# Patient Record
Sex: MALE | Race: OTHER | HISPANIC OR LATINO | URBAN - METROPOLITAN AREA
[De-identification: names, ages, dates, MRNs, and addresses within clinical notes are randomized per-mention and may not be internally consistent; named-entity substitution may affect disease eponyms.]

---

## 2019-05-23 PROBLEM — Z00.00 ENCOUNTER FOR PREVENTIVE HEALTH EXAMINATION: Status: ACTIVE | Noted: 2019-05-23

## 2019-05-31 ENCOUNTER — OUTPATIENT (OUTPATIENT)
Dept: OUTPATIENT SERVICES | Facility: HOSPITAL | Age: 79
LOS: 1 days | Discharge: HOME | End: 2019-05-31
Payer: SELF-PAY

## 2019-06-05 ENCOUNTER — APPOINTMENT (OUTPATIENT)
Dept: RADIATION ONCOLOGY | Facility: CLINIC | Age: 79
End: 2019-06-05
Payer: SELF-PAY

## 2019-06-05 VITALS
TEMPERATURE: 36.7 F | OXYGEN SATURATION: 98 % | SYSTOLIC BLOOD PRESSURE: 122 MMHG | DIASTOLIC BLOOD PRESSURE: 73 MMHG | HEIGHT: 62 IN | HEART RATE: 93 BPM | WEIGHT: 141.76 LBS | BODY MASS INDEX: 26.09 KG/M2 | RESPIRATION RATE: 16 BRPM

## 2019-06-05 DIAGNOSIS — E11.9 TYPE 2 DIABETES MELLITUS W/OUT COMPLICATIONS: ICD-10-CM

## 2019-06-05 DIAGNOSIS — E03.9 HYPOTHYROIDISM, UNSPECIFIED: ICD-10-CM

## 2019-06-05 DIAGNOSIS — N40.1 BENIGN PROSTATIC HYPERPLASIA WITH LOWER URINARY TRACT SYMPMS: ICD-10-CM

## 2019-06-05 PROCEDURE — 99204 OFFICE O/P NEW MOD 45 MIN: CPT | Mod: 25

## 2019-06-05 NOTE — PHYSICAL EXAM
[] : no respiratory distress [Heart Rate And Rhythm] : heart rate and rhythm were normal [Abdomen Soft] : soft [Nondistended] : nondistended [Normal] : oriented to person, place and time, the affect was normal, the mood was normal and not anxious

## 2019-06-05 NOTE — VITALS
[Maximal Pain Intensity: 2/10] : 2/10 [Pain Description/Quality: ___] : Pain description/quality: [unfilled] [Pain Duration: ___] : Pain duration: [unfilled] [Pain Location: ___] : Pain Location: [unfilled] [NoTreatment Scheduled] : no treatment scheduled [80: Normal activity with effort; some signs or symptoms of disease.] : 80: Normal activity with effort; some signs or symptoms of disease.  [ECOG Performance Status: 0 - Fully active, able to carry on all pre-disease performance without restriction] : Performance Status: 0 - Fully active, able to carry on all pre-disease performance without restriction [Date: ____________] : Patient's last distress assessment performed on [unfilled]. [0 - No Distress] : Distress Level: 0 [Least Pain Intensity: 0/10] : 0/10

## 2019-06-05 NOTE — REVIEW OF SYSTEMS
[Nocturia] : nocturia [IPSS Score (0-40): ___] : IPSS score: [unfilled] [EPIC-CP Score (0-60): ___] : EPIC-CP score: [unfilled] [Negative] : Psychiatric [Anal Pain: Grade 0] : Anal Pain: Grade 0 [Proctitis: Grade 0] : Proctitis: Grade 0 [Rectal Pain: Grade 0] : Rectal Pain: Grade 0 [Hematuria: Grade 0] : Hematuria: Grade 0 [Urinary Incontinence: Grade 0] : Urinary Incontinence: Grade 0  [Urinary Retention: Grade 1 - Urinary, suprapubic or intermittent catheter placement not indicated; able to void with some residual] : Urinary Retention: Grade 1 - Urinary, suprapubic or intermittent catheter placement not indicated; able to void with some residual [Urinary Tract Pain: Grade 0] : Urinary Tract Pain: Grade 0 [Urinary Urgency: Grade 1 - Present] : Urinary Urgency: Grade 1 - Present [Urinary Frequency: Grade 2 - Limiting instrumental ADL; medical management indicated] : Urinary Frequency: Grade 2 - Limiting instrumental ADL; medical management indicated [Ejaculation Disorder: Grade 1 - Diminished ejaculation] : Ejaculation Disorder: Grade 1 - Diminished ejaculation  [Erectile Dysfunction: Grade 1 - Decrease in erectile function (frequency or rigidity of erections) but intervention not indicated (e.g., medication or use of mechanical device, penile pump)] : Erectile Dysfunction: Grade 1 - Decrease in erectile function (frequency or rigidity of erections) but intervention not indicated (e.g., medication or use of mechanical device, penile pump)

## 2019-06-10 ENCOUNTER — RESULT REVIEW (OUTPATIENT)
Age: 79
End: 2019-06-10

## 2019-06-10 PROCEDURE — 88321 CONSLTJ&REPRT SLD PREP ELSWR: CPT

## 2019-06-12 NOTE — DISEASE MANAGEMENT
[b] : b [3] : T3 [7(3+4)] : Carrol Score 7(3+4) [>20] : >20 ng/mL [0] : M0 [] : Patient had a bone scan [IIIB] : IIIB [TotalCores] : 12 [TotalPositiveCores] : 4 [MaxCoreInvolvement] : 80

## 2019-06-12 NOTE — REASON FOR VISIT
[Consideration of Curative Therapy] : consideration of curative therapy for prostate cancer [Family Member] : family member [[employer] ] : provided by [employer]   [FreeTextEntry2] : Niru

## 2019-06-12 NOTE — HISTORY OF PRESENT ILLNESS
[FreeTextEntry1] : Mr. Lucas Cordova is a 78 yr old male who had  a screening PSA of 35.95 ng/mL on 1/14/19. \par \par He underwent prostate biopsy on 1/25/19. Four out of the 12 cores were positive for adenocarcinoma. There was a [place] 3+3 = 6 adenocarcinoma from the right lateral mid gland involving 80% of the specimen, left lateral mid gland involving less than 1% of the specimen, and in the left base involving less than 5% of the specimen. There was no evidence of perineural invasion. There was also a [place] 3+4 = 7 adenocarcinoma from the left apex involving 40% of the specimen. Perineural invasion was identified.\par \par A bone scan on 2/6/19 showed no evidence of metastatic disease. There was a horizontal band of increased activity corresponding to a T12 compression fracture on CT. A CT of the abdomen and pelvis on 2/6/19 showed an enlarged spleen, and enlarged prostate with prominent enhancement of the superior prostate which was inseparable from the adjacent seminal vesicle. There was no evidence of pelvic lymphadenopathy.\par \par He received a Lupron injection on 2/5/19. He complains of difficulty urinating for the past few years. He has not taken any alpha blockers. He is recently  and often sad, as per his daughter. \par \par \par

## 2019-06-13 LAB — SURGICAL PATHOLOGY STUDY: SIGNIFICANT CHANGE UP

## 2019-06-13 PROCEDURE — 77263 THER RADIOLOGY TX PLNG CPLX: CPT

## 2019-12-31 ENCOUNTER — APPOINTMENT (OUTPATIENT)
Dept: RADIATION ONCOLOGY | Facility: CLINIC | Age: 79
End: 2019-12-31

## 2020-03-03 ENCOUNTER — APPOINTMENT (OUTPATIENT)
Dept: RADIATION ONCOLOGY | Facility: CLINIC | Age: 80
End: 2020-03-03
Payer: MEDICAID

## 2020-03-03 VITALS
SYSTOLIC BLOOD PRESSURE: 127 MMHG | DIASTOLIC BLOOD PRESSURE: 70 MMHG | HEART RATE: 86 BPM | BODY MASS INDEX: 25.16 KG/M2 | RESPIRATION RATE: 17 BRPM | WEIGHT: 137.57 LBS | OXYGEN SATURATION: 100 % | TEMPERATURE: 97.8 F

## 2020-03-03 PROCEDURE — 99214 OFFICE O/P EST MOD 30 MIN: CPT | Mod: GC,25

## 2020-03-03 RX ORDER — MECLIZINE HYDROCHLORIDE 12.5 MG/1
12.5 TABLET ORAL
Refills: 0 | Status: ACTIVE | COMMUNITY

## 2020-03-03 RX ORDER — LEUPROLIDE ACETATE 45 MG
KIT INTRAMUSCULAR
Refills: 0 | Status: ACTIVE | COMMUNITY

## 2020-03-03 RX ORDER — LEVOTHYROXINE SODIUM 0.17 MG/1
TABLET ORAL
Refills: 0 | Status: ACTIVE | COMMUNITY

## 2020-03-03 NOTE — REVIEW OF SYSTEMS
[IPSS Score (0-40): ___] : IPSS score: [unfilled] [EPIC-CP Score (0-60): ___] : EPIC-CP score: [unfilled] [Negative] : Allergic/Immunologic [Anal Pain: Grade 0] : Anal Pain: Grade 0 [Hematuria: Grade 0] : Hematuria: Grade 0 [Urinary Tract Pain: Grade 0] : Urinary Tract Pain: Grade 0 [Urinary Urgency: Grade 2 - Limiting instrumental ADL; medical management indicated] : Urinary Urgency: Grade 2 - Limiting instrumental ADL; medical management indicated [Urinary Frequency: Grade 0] : Urinary Frequency: Grade 0 [Erectile Dysfunction: Grade 0] : Erectile Dysfunction: Grade 0 [Ejaculation Disorder: Grade 0] : Ejaculation Disorder: Grade 0

## 2020-03-04 ENCOUNTER — OUTPATIENT (OUTPATIENT)
Dept: OUTPATIENT SERVICES | Facility: HOSPITAL | Age: 80
LOS: 1 days | Discharge: HOME | End: 2020-03-04
Payer: MEDICAID

## 2020-03-04 PROCEDURE — 77263 THER RADIOLOGY TX PLNG CPLX: CPT

## 2020-03-04 NOTE — PHYSICAL EXAM
[Outer Ear] : the ears and nose were normal in appearance [Sclera] : the sclera and conjunctiva were normal [] : no respiratory distress [Arterial Pulses Normal] : the arterial pulses were normal [Abdomen Soft] : soft [Nondistended] : nondistended [Normal] : oriented to person, place and time, the affect was normal, the mood was normal and not anxious

## 2020-03-17 PROCEDURE — 76872 US TRANSRECTAL: CPT | Mod: 26

## 2020-03-17 PROCEDURE — 55876 PLACE RT DEVICE/MARKER PROS: CPT

## 2020-03-17 PROCEDURE — 55874 TPRNL PLMT BIODEGRDABL MATRL: CPT

## 2020-03-25 ENCOUNTER — OUTPATIENT (OUTPATIENT)
Dept: OUTPATIENT SERVICES | Facility: HOSPITAL | Age: 80
LOS: 1 days | End: 2020-03-25
Payer: MEDICAID

## 2020-03-25 ENCOUNTER — OUTPATIENT (OUTPATIENT)
Dept: OUTPATIENT SERVICES | Facility: HOSPITAL | Age: 80
LOS: 1 days | End: 2020-03-25

## 2020-03-25 ENCOUNTER — APPOINTMENT (OUTPATIENT)
Dept: MRI IMAGING | Facility: IMAGING CENTER | Age: 80
End: 2020-03-25

## 2020-03-25 DIAGNOSIS — C61 MALIGNANT NEOPLASM OF PROSTATE: ICD-10-CM

## 2020-03-25 PROCEDURE — 76498 UNLISTED MR PROCEDURE: CPT

## 2020-03-25 NOTE — REASON FOR VISIT
[Family Member] : family member [FreeTextEntry2] : Transperineal insertion of SpaceOAR gel and fiducial marker/beacon placement

## 2020-03-25 NOTE — PROCEDURE
[FreeTextEntry1] : Transperineal insertion of SpaceOAR gel and beacon placement  [FreeTextEntry2] : Prostate cancer treatment with radiation therapy [FreeTextEntry3] : In preparation for the procedure, he self administered an enema one hour before leaving home and was NPO the night before the procedure. He took Valium 5 mg po one hour prior to procedure Procedure risks and benefits were reviewed with the patient and consent was obtained prior to procedure. A time out was observed with patient name, date of birth, procedure, position, and site verified. \par Patient was placed in a lithotomy position. Povidone-iodine was used to prep the skin. While maintaining aseptic technique an ultrasound probe was inserted into the rectum to visualize the prostate. Lidocaine 2% and sodium bicarbonate was infused into the perineal area.\par Three beacons were prepared on the sterile field. The right beacon marker was placed at the right base, left beacon marker was placed at the left base, and the third beacon marker was placed at the apex, via 14 gauge needles under ultrasound guidance. \par Next, the hydrogel spacer kit was opened onto the sterile field and the hydrogel injection apparatus was prepared. An 18 gauge needle was positioned into the mid-line perirectal fat between the anterior rectal wall and prostate under ultrasound guidance. Less than 10 cc of saline was injected via the needle to hydrodissect the space and confirm proper placement in both axial and sagittal views. The syringe was aspirated to confirm the needle was extravascular. The syringe was replaced with the hydrogel injection apparatus and the gel was injected over about 10 seconds. The needle was removed. There was minimal blood loss. The patient tolerated procedure well.\par Patient was transferred to the recovery area on a monitor. Vital signs were  recorded frequently ,He became a little nauseous and hypotensive vital signs were continued to be monitored until stable He tolerated fluid and a snack by mouth and was made comfortable.  He denied pain,He was observed in the unit and was able to ambulate independently and voided in the bathroom.   Post procedure instructions were given and reviewed with his son CT/SIM and MRI appointment were given. Pt was discharged home in care of his son.

## 2020-04-02 PROCEDURE — 77301 RADIOTHERAPY DOSE PLAN IMRT: CPT | Mod: 26

## 2020-04-02 PROCEDURE — 77300 RADIATION THERAPY DOSE PLAN: CPT | Mod: 26

## 2020-04-02 PROCEDURE — 77338 DESIGN MLC DEVICE FOR IMRT: CPT | Mod: 26

## 2020-04-03 DIAGNOSIS — C61 MALIGNANT NEOPLASM OF PROSTATE: ICD-10-CM

## 2020-04-15 NOTE — VITALS
[Maximal Pain Intensity: 0/10] : 0/10 [Least Pain Intensity: 0/10] : 0/10 [Pain Description/Quality: ___] : Pain description/quality: [unfilled] [80: Normal activity with effort; some signs or symptoms of disease.] : 80: Normal activity with effort; some signs or symptoms of disease.  [ECOG Performance Status: 0 - Fully active, able to carry on all pre-disease performance without restriction] : Performance Status: 0 - Fully active, able to carry on all pre-disease performance without restriction

## 2020-04-17 NOTE — PROCEDURE
[FreeTextEntry1] : Lupron injection [FreeTextEntry2] : As part of his treatment for prostate cancer  [FreeTextEntry3] : Mr. RIDDHI CISSE  is a 79 year  old male  with Carrol 3+5=7 and a pretreatment  PSA of 35.95 ng/ml[]  and a significant tumor burden, negative metastatic workup. He is scheduled to received definitive external beam radiation therapy with concurrent hormone therapy\par Lupron 22.5 mg administered into the left Gluteus patt side effects reviewed such as mood changes ,loss of sexual functions weight changes and hot flashes.His next Lupron is due 7/15/20\par Lot #[id number]\par Expiration 8/20/22\par

## 2020-05-01 PROCEDURE — 77387C: CUSTOM

## 2020-05-04 PROCEDURE — 77387C: CUSTOM

## 2020-05-05 PROCEDURE — 77387C: CUSTOM

## 2020-05-06 PROCEDURE — 77387C: CUSTOM

## 2020-05-07 PROCEDURE — 77427 RADIATION TX MANAGEMENT X5: CPT

## 2020-05-07 PROCEDURE — 77387C: CUSTOM

## 2020-05-07 NOTE — VITALS
[Least Pain Intensity: 0/10] : 0/10 [70: Cares for self; unalbe to carry on normal activity or do active work.] : 70: Cares for self; unable to carry on normal activity or do active work. [Maximal Pain Intensity: 0/10] : 0/10

## 2020-05-07 NOTE — REVIEW OF SYSTEMS
[Anal Pain: Grade 0] : Anal Pain: Grade 0 [Diarrhea: Grade 0] : Diarrhea: Grade 0 [Constipation: Grade 0] : Constipation: Grade 0 [Nausea: Grade 0] : Nausea: Grade 0 [Rectal Pain: Grade 0] : Rectal Pain: Grade 0 [Vomiting: Grade 0] : Vomiting: Grade 0 [Urinary Retention: Grade 0] : Urinary Retention: Grade 0 [Urinary Incontinence: Grade 0] : Urinary Incontinence: Grade 0  [Hematuria: Grade 0] : Hematuria: Grade 0 [Urinary Urgency: Grade 0] : Urinary Urgency: Grade 0 [Urinary Tract Pain: Grade 0] : Urinary Tract Pain: Grade 0 [Urinary Frequency: Grade 0] : Urinary Frequency: Grade 0

## 2020-05-08 PROCEDURE — 77387C: CUSTOM

## 2020-05-08 NOTE — HISTORY OF PRESENT ILLNESS
[FreeTextEntry1] : Mr. 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 His metastatic work up was negative He is currently receiving ADT and hypofractionated EBRT.\par \par Today is doing well overall. He denies pain.  He endorses improvement in urinary frequency.  He endorses a weaker urinary stream. He denies dysuria, hematuria, urgency, diarrhea, and constipation.  He endorses mild fatigue.  \par

## 2020-05-08 NOTE — DISEASE MANAGEMENT
[TTNM] : 3 [NTNM] : 0 [MTNM] : 0 [de-identified] : 1250 cGy [de-identified] : 7000 cGy [de-identified] : prostate

## 2020-05-11 PROCEDURE — 77387C: CUSTOM

## 2020-05-12 PROCEDURE — 77387C: CUSTOM

## 2020-05-13 PROCEDURE — 77387C: CUSTOM

## 2020-05-14 PROCEDURE — 77387C: CUSTOM

## 2020-05-14 PROCEDURE — 77427 RADIATION TX MANAGEMENT X5: CPT

## 2020-05-14 RX ORDER — AMOXICILLIN AND CLAVULANATE POTASSIUM 875; 125 MG/1; MG/1
875-125 TABLET, COATED ORAL
Qty: 6 | Refills: 0 | Status: COMPLETED | COMMUNITY
Start: 2020-03-04 | End: 2020-05-14

## 2020-05-14 RX ORDER — DIAZEPAM 5 MG/1
5 TABLET ORAL
Qty: 1 | Refills: 0 | Status: COMPLETED | COMMUNITY
Start: 2020-03-04 | End: 2020-05-14

## 2020-05-14 NOTE — DISEASE MANAGEMENT
[Clinical] : TNM Stage: c [IIIB] : IIIB [TTNM] : 3 [NTNM] : 0 [MTNM] : 0 [de-identified] : prostate [de-identified] : 2500 cGy [de-identified] : 7000 cGy

## 2020-05-14 NOTE — HISTORY OF PRESENT ILLNESS
[FreeTextEntry1] : Mr. 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 His metastatic work up was negative He is currently receiving ADT and hypofractionated EBRT.\par \par He is feeling more fatigued, usually after the treatment for a few hours, takes a nap. He also has a mild headache with the fatigue, which resolves after rest, sometimes after Tylenol.  He is having more urgency and weak stream, especially at night.  BM are regular.

## 2020-05-14 NOTE — REVIEW OF SYSTEMS
[Anal Pain: Grade 0] : Anal Pain: Grade 0 [Diarrhea: Grade 0] : Diarrhea: Grade 0 [Constipation: Grade 0] : Constipation: Grade 0 [Nausea: Grade 0] : Nausea: Grade 0 [Rectal Pain: Grade 0] : Rectal Pain: Grade 0 [Vomiting: Grade 0] : Vomiting: Grade 0 [Hematuria: Grade 0] : Hematuria: Grade 0 [Urinary Incontinence: Grade 0] : Urinary Incontinence: Grade 0  [Urinary Tract Pain: Grade 0] : Urinary Tract Pain: Grade 0 [Urinary Retention: Grade 2 - Placement of urinary, suprapubic or intermittent catheter placement indicated; medication indicated] : Urinary Retention: Grade 2 - Placement of urinary, suprapubic or intermittent catheter placement indicated; medication indicated [Urinary Urgency: Grade 2 - Limiting instrumental ADL; medical management indicated] : Urinary Urgency: Grade 2 - Limiting instrumental ADL; medical management indicated [Urinary Frequency: Grade 1 - Present] : Urinary Frequency: Grade 1 - Present

## 2020-05-15 PROCEDURE — 77387C: CUSTOM

## 2020-05-18 PROCEDURE — 77387C: CUSTOM

## 2020-05-19 PROCEDURE — 77387C: CUSTOM

## 2020-05-20 PROCEDURE — 77387C: CUSTOM

## 2020-05-21 PROCEDURE — 77427 RADIATION TX MANAGEMENT X5: CPT

## 2020-05-21 NOTE — PHYSICAL EXAM
[Normal] : well developed, well nourished, in no acute distress [Nondistended] : nondistended [Abdomen Soft] : soft

## 2020-05-21 NOTE — VITALS
[Maximal Pain Intensity: 2/10] : 2/10 [Least Pain Intensity: 0/10] : 0/10 [Pain Duration: ___] : Pain duration: [unfilled] [Pain Description/Quality: ___] : Pain description/quality: [unfilled] [Pain Location: ___] : Pain Location: [unfilled] [70: Cares for self; unalbe to carry on normal activity or do active work.] : 70: Cares for self; unable to carry on normal activity or do active work.

## 2020-05-22 PROCEDURE — 77387C: CUSTOM

## 2020-05-26 PROCEDURE — 77387C: CUSTOM

## 2020-05-27 PROCEDURE — 77387C: CUSTOM

## 2020-05-27 NOTE — VITALS
[Maximal Pain Intensity: 2/10] : 2/10 [Least Pain Intensity: 0/10] : 0/10 [Pain Description/Quality: ___] : Pain description/quality: [unfilled] [Pain Duration: ___] : Pain duration: [unfilled] [Pain Location: ___] : Pain Location: [unfilled] [70: Cares for self; unalbe to carry on normal activity or do active work.] : 70: Cares for self; unable to carry on normal activity or do active work.

## 2020-05-27 NOTE — DISEASE MANAGEMENT
[Clinical] : TNM Stage: c [IIIB] : IIIB [TTNM] : 3 [NTNM] : 0 [de-identified] : 3750 cGy [MTNM] : 0 [de-identified] : 7000 cGy [de-identified] : prostate

## 2020-05-27 NOTE — HISTORY OF PRESENT ILLNESS
[FreeTextEntry1] : Mr. 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 His metastatic work up was negative He is currently receiving ADT and hypofractionated EBRT.\par \par He is feeling more fatigued and is taking a nap daily. He is having more urgency and weak stream, especially at night. He is also having mild burning at times. He has not started the Flomax yet.

## 2020-05-28 PROCEDURE — 77387C: CUSTOM

## 2020-05-28 NOTE — REVIEW OF SYSTEMS
[Anal Pain: Grade 0] : Anal Pain: Grade 0 [Diarrhea: Grade 0] : Diarrhea: Grade 0 [Nausea: Grade 0] : Nausea: Grade 0 [Rectal Pain: Grade 0] : Rectal Pain: Grade 0 [Vomiting: Grade 0] : Vomiting: Grade 0 [Hematuria: Grade 0] : Hematuria: Grade 0 [Urinary Incontinence: Grade 0] : Urinary Incontinence: Grade 0  [Urinary Retention: Grade 2 - Placement of urinary, suprapubic or intermittent catheter placement indicated; medication indicated] : Urinary Retention: Grade 2 - Placement of urinary, suprapubic or intermittent catheter placement indicated; medication indicated [Urinary Tract Pain: Grade 0] : Urinary Tract Pain: Grade 0 [Urinary Urgency: Grade 2 - Limiting instrumental ADL; medical management indicated] : Urinary Urgency: Grade 2 - Limiting instrumental ADL; medical management indicated [Constipation: Grade 1 - Occasional or intermittent symptoms; occasional use of stool softeners, laxatives, dietary modification, or enema] : Constipation: Grade 1 - Occasional or intermittent symptoms; occasional use of stool softeners, laxatives, dietary modification, or enema [Urinary Frequency: Grade 2 - Limiting instrumental ADL; medical management indicated] : Urinary Frequency: Grade 2 - Limiting instrumental ADL; medical management indicated

## 2020-05-29 PROCEDURE — 77427 RADIATION TX MANAGEMENT X5: CPT

## 2020-05-29 PROCEDURE — 77387C: CUSTOM

## 2020-05-29 NOTE — DISEASE MANAGEMENT
[3] : T3 [b] : b [0] : M0 [>20] : >20 ng/mL [] : Patient had a CT scan [7(3+4)] : Carrol Score 7(3+4) [IIIB] : IIIB [TotalCores] : 12 [TotalPositiveCores] : 4 [MaxCoreInvolvement] : 80

## 2020-05-29 NOTE — HISTORY OF PRESENT ILLNESS
[FreeTextEntry1] : Mr. 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 His metastatic work up was negative He is currently receiving ADT and hypofractionated EBRT.\par \par  He was advised to start the tamsulosin last week for increased urinary retention and urgency.  He finds that the medication is a little helpful, stream not as weak.  However, still gets up 8 times per night to urinate.  Drinks a lot of fluids in the evening, such as, tea, coffee, water at bedtime.  Mild constipation.  No new complaints.

## 2020-05-29 NOTE — DISEASE MANAGEMENT
[Clinical] : TNM Stage: c [IIIB] : IIIB [TTNM] : 3 [NTNM] : 0 [MTNM] : 0 [de-identified] : 5650 cGy [de-identified] : 7000 cGy [de-identified] : Prostate

## 2020-05-29 NOTE — HISTORY OF PRESENT ILLNESS
[FreeTextEntry1] : Mr. Luacs Cordova is a 79 yr old male who had  a screening PSA of 35.95 ng/mL on 1/14/19. \par \par He underwent prostate biopsy on 1/25/19. Four out of the 12 cores were positive for adenocarcinoma. There was a [place] 3+3 = 6 adenocarcinoma from the right lateral mid gland involving 80% of the specimen, left lateral mid gland involving less than 1% of the specimen, and in the left base involving less than 5% of the specimen. There was no evidence of perineural invasion. There was also a [place] 3+4 = 7 adenocarcinoma from the left apex involving 40% of the specimen. Perineural invasion was identified.\par \par A bone scan on 2/6/19 showed no evidence of metastatic disease. There was a horizontal band of increased activity corresponding to a T12 compression fracture on CT. A CT of the abdomen and pelvis on 2/6/19 showed an enlarged spleen, and enlarged prostate with prominent enhancement of the superior prostate which was inseparable from the adjacent seminal vesicle. There was no evidence of pelvic lymphadenopathy.\par \par He received a Lupron injection on 2/5/19. He was last seen in clinic on 6/5/19 and at the time he complained of difficulty urinating for the past few years. Flomax was given to him which he said was helpful. However he developed some nausea when he first started taking Flomax, but the nausea has later subsided. \par \par He was having difficulty with insurance and did not receive radiation treatment. He continues to get Lupron injections at [facility]. He was last seen by Dr. Mills on 1/29/2020 who recommended that he seek definitive therapy. His PSA in November 2019 was 0.07.\par

## 2020-05-29 NOTE — LETTER CLOSING
[Consult Closing:] : Thank you for allowing me to participate in the care of this patient.  If you have any questions, please do not hesitate to contact me. [Sincerely yours,] : Sincerely yours, [FreeTextEntry3] : Maryuri Franco MD\par

## 2020-06-01 PROCEDURE — 77387C: CUSTOM

## 2020-06-02 PROCEDURE — 77387C: CUSTOM

## 2020-06-03 PROCEDURE — 77387C: CUSTOM

## 2020-06-03 NOTE — REVIEW OF SYSTEMS
[Anal Pain: Grade 0] : Anal Pain: Grade 0 [Constipation: Grade 1 - Occasional or intermittent symptoms; occasional use of stool softeners, laxatives, dietary modification, or enema] : Constipation: Grade 1 - Occasional or intermittent symptoms; occasional use of stool softeners, laxatives, dietary modification, or enema [Diarrhea: Grade 0] : Diarrhea: Grade 0 [Nausea: Grade 0] : Nausea: Grade 0 [Rectal Pain: Grade 0] : Rectal Pain: Grade 0 [Vomiting: Grade 0] : Vomiting: Grade 0 [Hematuria: Grade 0] : Hematuria: Grade 0 [Urinary Incontinence: Grade 0] : Urinary Incontinence: Grade 0  [Urinary Retention: Grade 2 - Placement of urinary, suprapubic or intermittent catheter placement indicated; medication indicated] : Urinary Retention: Grade 2 - Placement of urinary, suprapubic or intermittent catheter placement indicated; medication indicated [Urinary Tract Pain: Grade 0] : Urinary Tract Pain: Grade 0 [Urinary Urgency: Grade 2 - Limiting instrumental ADL; medical management indicated] : Urinary Urgency: Grade 2 - Limiting instrumental ADL; medical management indicated [Urinary Frequency: Grade 2 - Limiting instrumental ADL; medical management indicated] : Urinary Frequency: Grade 2 - Limiting instrumental ADL; medical management indicated

## 2020-06-04 PROCEDURE — 77387C: CUSTOM

## 2020-06-05 PROCEDURE — 77387C: CUSTOM

## 2020-06-08 PROCEDURE — 77387C: CUSTOM

## 2020-06-08 NOTE — HISTORY OF PRESENT ILLNESS
[FreeTextEntry1] : Mr. 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 His metastatic work up was negative He is currently receiving ADT and hypofractionated EBRT.\par \par He continues to take the tamsulosin  for increased urinary retention and urgency.  He finds that the medication is a little helpful, stream not as weak.  However, last visit he still reports he gets up 8 times per night to urinate. Instructed last visit to do trial of 2 tabs of tamsulosin at bedtime.  He is now taking one tab in morning and one at bedtime and urinating less in the day and getting up 5 days at bedtime, which is an improvement.  Some fatigue, relieved by rest.

## 2020-06-08 NOTE — DISEASE MANAGEMENT
[Clinical] : TNM Stage: c [IIIB] : IIIB [TTNM] : 3 [NTNM] : 0 [MTNM] : 0 [de-identified] : 5750 cGy [de-identified] : 7000 cGy [de-identified] : Prostate

## 2020-06-09 PROCEDURE — 77387C: CUSTOM

## 2020-06-10 PROCEDURE — 77387C: CUSTOM

## 2020-06-10 PROCEDURE — 77427 RADIATION TX MANAGEMENT X5: CPT

## 2020-06-10 NOTE — REASON FOR VISIT
[Routine On-Treatment] : a routine on-treatment visit for [Family Member] : family member [Prostate Cancer] : prostate cancer

## 2020-06-10 NOTE — REVIEW OF SYSTEMS
[Anal Pain: Grade 0] : Anal Pain: Grade 0 [Constipation: Grade 1 - Occasional or intermittent symptoms; occasional use of stool softeners, laxatives, dietary modification, or enema] : Constipation: Grade 1 - Occasional or intermittent symptoms; occasional use of stool softeners, laxatives, dietary modification, or enema [Diarrhea: Grade 0] : Diarrhea: Grade 0 [Nausea: Grade 0] : Nausea: Grade 0 [Rectal Pain: Grade 0] : Rectal Pain: Grade 0 [Vomiting: Grade 0] : Vomiting: Grade 0 [Urinary Incontinence: Grade 0] : Urinary Incontinence: Grade 0  [Hematuria: Grade 0] : Hematuria: Grade 0 [Urinary Tract Pain: Grade 0] : Urinary Tract Pain: Grade 0 [Fatigue: Grade 1 - Fatigue relieved by rest] : Fatigue: Grade 1 - Fatigue relieved by rest [Urinary Retention: Grade 1 - Urinary, suprapubic or intermittent catheter placement not indicated; able to void with some residual] : Urinary Retention: Grade 1 - Urinary, suprapubic or intermittent catheter placement not indicated; able to void with some residual [Urinary Urgency: Grade 1 - Present] : Urinary Urgency: Grade 1 - Present [Urinary Frequency: Grade 1 - Present] : Urinary Frequency: Grade 1 - Present

## 2020-06-15 NOTE — DISEASE MANAGEMENT
[Clinical] : TNM Stage: c [IIIB] : IIIB [TTNM] : 3 [NTNM] : 0 [MTNM] : 0 [de-identified] : 7000 cGy [de-identified] : 7000 cGy

## 2020-06-15 NOTE — HISTORY OF PRESENT ILLNESS
[FreeTextEntry1] : Mr. Lucas Cordova is a 79 year old male with prognostic stage IIIB adenocarcinoma of the prostate, with a [place] score of 3+4=7 and a pre-treatment PSA of 35.95 ng/mL. His metastatic work up was negative He is currently receiving ADT and hypofractionated EBRT.\par \par He completes RT today, and has a followup scheduled on 7/15/20 for his 3rd Lupron injection and PTE.\par He continues to take the tamsulosin for increased urinary retention and urgency.  Previously he reported he was still getting up 8 times per night to urinate. He increased the tamsulosin to one tablet in the morning and one at bedtime, and having some relief with less getting up to urinate at night.  Some fatigue, relieved by rest.\par \par 1st Lupron at [facility] (2/2019) another Lupron 6 mos. given June 2019, Lupron 1 mo. given Jan. 2020.  Lupron given here on 4/15/2020. 3rd due in 7/15/2020.  Plan for at least two years of hormone therapy.

## 2020-06-15 NOTE — VITALS
[Maximal Pain Intensity: 2/10] : 2/10 [Least Pain Intensity: 0/10] : 0/10 [Pain Description/Quality: ___] : Pain description/quality: [unfilled] [Pain Duration: ___] : Pain duration: [unfilled] [Pain Location: ___] : Pain Location: [unfilled] [80: Normal activity with effort; some signs or symptoms of disease.] : 80: Normal activity with effort; some signs or symptoms of disease.  [ECOG Performance Status: 1 - Restricted in physically strenuous activity but ambulatory and able to carry out work of a light or sedentary nature] : Performance Status: 1 - Restricted in physically strenuous activity but ambulatory and able to carry out work of a light or sedentary nature, e.g., light house work, office work

## 2020-07-15 ENCOUNTER — APPOINTMENT (OUTPATIENT)
Dept: RADIATION ONCOLOGY | Facility: CLINIC | Age: 80
End: 2020-07-15

## 2020-07-20 NOTE — REASON FOR VISIT
[Post-Treatment Evaluation] : post-treatment evaluation for prostate cancer [Family Member] : family member

## 2020-07-21 ENCOUNTER — APPOINTMENT (OUTPATIENT)
Dept: RADIATION ONCOLOGY | Facility: CLINIC | Age: 80
End: 2020-07-21
Payer: SELF-PAY

## 2020-07-21 VITALS
BODY MASS INDEX: 25.92 KG/M2 | WEIGHT: 140.87 LBS | DIASTOLIC BLOOD PRESSURE: 58 MMHG | OXYGEN SATURATION: 100 % | TEMPERATURE: 98.5 F | HEIGHT: 62 IN | HEART RATE: 92 BPM | RESPIRATION RATE: 17 BRPM | SYSTOLIC BLOOD PRESSURE: 101 MMHG

## 2020-07-21 PROCEDURE — 99024 POSTOP FOLLOW-UP VISIT: CPT | Mod: GC

## 2020-07-21 NOTE — REVIEW OF SYSTEMS
[Negative] : Allergic/Immunologic [Anal Pain: Grade 0] : Anal Pain: Grade 0 [Urinary Urgency: Grade 2 - Limiting instrumental ADL; medical management indicated] : Urinary Urgency: Grade 2 - Limiting instrumental ADL; medical management indicated [Hematuria: Grade 0] : Hematuria: Grade 0 [Urinary Tract Pain: Grade 0] : Urinary Tract Pain: Grade 0 [Urinary Frequency: Grade 0] : Urinary Frequency: Grade 0 [Erectile Dysfunction: Grade 0] : Erectile Dysfunction: Grade 0 [Ejaculation Disorder: Grade 0] : Ejaculation Disorder: Grade 0 [IPSS Score (0-40): ___] : IPSS score: [unfilled] [EPIC-CP Score (0-60): ___] : EPIC-CP score: [unfilled]

## 2020-07-21 NOTE — REVIEW OF SYSTEMS
[Negative] : Heme/Lymph [Anal Pain: Grade 0] : Anal Pain: Grade 0 [Hematuria: Grade 0] : Hematuria: Grade 0 [Urinary Urgency: Grade 2 - Limiting instrumental ADL; medical management indicated] : Urinary Urgency: Grade 2 - Limiting instrumental ADL; medical management indicated [Urinary Tract Pain: Grade 0] : Urinary Tract Pain: Grade 0 [Urinary Frequency: Grade 0] : Urinary Frequency: Grade 0 [Erectile Dysfunction: Grade 0] : Erectile Dysfunction: Grade 0 [Ejaculation Disorder: Grade 0] : Ejaculation Disorder: Grade 0 [IPSS Score (0-40): ___] : IPSS score: [unfilled] [EPIC-CP Score (0-60): ___] : EPIC-CP score: [unfilled]

## 2020-07-22 LAB — PSA SERPL-MCNC: 0.02 NG/ML

## 2020-07-24 NOTE — HISTORY OF PRESENT ILLNESS
[FreeTextEntry1] : Mr. 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 He started ADT in Feb. 2019 and continues to the present day. He presents for a post treatment evaluation.\par \par He is feeling well. He is involved in all his normal activities. He denies pain or discomfort.  He continues to have urinary frequency and nocturia, a little improved since treatment. He continues tamsulosin. He has no dysuria, hematuria or incontinence. The bowel movements are regular, and there is no rectal pain, bleeding or discharge.  He has erectile dysfunction without any change. \par \par Last Lupron was given here on 4/15/2020 (and this was the first injection to be given here at [facility] in [facility]).  He is due for another Lupron injection now, pending insurance authorization. Plan for at least two years of hormone therapy. \par

## 2020-07-24 NOTE — PHYSICAL EXAM
[Normal] : well developed, well nourished, in no acute distress [Sclera] : the sclera and conjunctiva were normal [] : no respiratory distress [Heart Rate And Rhythm] : heart rate and rhythm were normal [Abdomen Soft] : soft [Nondistended] : nondistended [No Focal Deficits] : no focal deficits

## 2020-07-24 NOTE — DISEASE MANAGEMENT
[3] : T3 [b] : b [0] : M0 [7(3+4)] : Carrol Score 7(3+4) [>20] : >20 ng/mL [] : Patient had a CT scan [IIIB] : IIIB [Radiation Therapy] : Radiation Therapy [TotalCores] : 12 [TotalPositiveCores] : 4 [MaxCoreInvolvement] : 80

## 2020-07-24 NOTE — DISEASE MANAGEMENT
[3] : T3 [b] : b [0] : M0 [>20] : >20 ng/mL [7(3+4)] : Carrol Score 7(3+4) [] : Patient had a CT scan [IIIB] : IIIB [Radiation Therapy] : Radiation Therapy [TotalCores] : 12 [TotalPositiveCores] : 4 [MaxCoreInvolvement] : 80

## 2020-07-24 NOTE — HISTORY OF PRESENT ILLNESS
[FreeTextEntry1] : Mr. 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 He started ADT in Feb. 2019 and continues to the present day. He presents for a post treatment evaluation.\par \par He is feeling well. He is involved in all his normal activities. He denies pain or discomfort.  He continues to have urinary frequency and nocturia, a little improved since treatment. He continues tamsulosin. He has no dysuria, hematuria or incontinence. The bowel movements are regular, and there is no rectal pain, bleeding or discharge.  He has erectile dysfunction without any change. \par \par Last Lupron was given here on 4/15/2020 (and this was the first injection to be given here at [facility] in [facility]).  He is due for another Lupron injection now, pending insurance authorization. Plan for at least two years of hormone therapy. \par

## 2020-07-28 NOTE — DATA REVIEWED
[FreeTextEntry1] : Patient received Lupron 45mg injection IM left gluteal. Lot [id number].  Exp 9/25/21.\par Approved through medical benefits.  In future, must be obtained from medical benefits and administered in clinic.   Next injection in 6months on 1/26/2021.

## 2020-08-21 ENCOUNTER — APPOINTMENT (OUTPATIENT)
Dept: DERMATOLOGY | Facility: CLINIC | Age: 80
End: 2020-08-21

## 2021-01-20 RX ORDER — LEUPROLIDE ACETATE 22.5 MG
22.5 KIT INTRAMUSCULAR
Qty: 1 | Refills: 1 | Status: DISCONTINUED | COMMUNITY
Start: 2020-04-01 | End: 2021-01-20

## 2021-01-26 ENCOUNTER — APPOINTMENT (OUTPATIENT)
Dept: RADIATION ONCOLOGY | Facility: CLINIC | Age: 81
End: 2021-01-26
Payer: MEDICAID

## 2021-02-02 ENCOUNTER — APPOINTMENT (OUTPATIENT)
Dept: RADIATION ONCOLOGY | Facility: CLINIC | Age: 81
End: 2021-02-02
Payer: MEDICAID

## 2021-02-09 ENCOUNTER — APPOINTMENT (OUTPATIENT)
Dept: RADIATION ONCOLOGY | Facility: CLINIC | Age: 81
End: 2021-02-09
Payer: MEDICAID

## 2021-02-09 VITALS — RESPIRATION RATE: 16 BRPM | SYSTOLIC BLOOD PRESSURE: 110 MMHG | DIASTOLIC BLOOD PRESSURE: 86 MMHG

## 2021-02-09 PROCEDURE — 99212 OFFICE O/P EST SF 10 MIN: CPT

## 2021-02-09 RX ORDER — TAMSULOSIN HYDROCHLORIDE 0.4 MG/1
0.4 CAPSULE ORAL
Qty: 90 | Refills: 2 | Status: DISCONTINUED | COMMUNITY
Start: 2019-06-05 | End: 2021-02-09

## 2021-02-09 NOTE — HISTORY OF PRESENT ILLNESS
[FreeTextEntry1] : Mr. 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 He started ADT in Feb. 2019 and continues to the present day- last Lupron injection in 7/2020 (6 month). He presents for a routine follow up.\par \par He is feeling well. He is involved in all his normal activities. He denies pain or discomfort.  He continues to have mild urinary frequency and nocturia. When he starts to urinate, he has to wait a few minutes, then he can empty the bladder.   He has no dysuria, hematuria or incontinence. The bowel movements are regular, and there is no rectal pain, bleeding or discharge.  He has erectile dysfunction without any change.  He reports that his PMD told him that he was anemic. \par \par Last Lupron (45mg) was given here on 7/28/2020 (his first injection given here at [facility] in [facility] was on 4/15/20). \par

## 2021-02-09 NOTE — REVIEW OF SYSTEMS
[EPIC-CP Score (0-60): ___] : EPIC-CP score: [unfilled] [Negative] : Allergic/Immunologic [Anal Pain: Grade 0] : Anal Pain: Grade 0 [Hematuria: Grade 0] : Hematuria: Grade 0 [Urinary Tract Pain: Grade 0] : Urinary Tract Pain: Grade 0 [Constipation: Grade 0] : Constipation: Grade 0 [Diarrhea: Grade 0] : Diarrhea: Grade 0 [Rectal Pain: Grade 0] : Rectal Pain: Grade 0 [Urinary Incontinence: Grade 0] : Urinary Incontinence: Grade 0  [Urinary Retention: Grade 1 - Urinary, suprapubic or intermittent catheter placement not indicated; able to void with some residual] : Urinary Retention: Grade 1 - Urinary, suprapubic or intermittent catheter placement not indicated; able to void with some residual [Urinary Urgency: Grade 1 - Present] : Urinary Urgency: Grade 1 - Present [Urinary Frequency: Grade 1 - Present] : Urinary Frequency: Grade 1 - Present [Ejaculation Disorder: Grade 1 - Diminished ejaculation] : Ejaculation Disorder: Grade 1 - Diminished ejaculation  [Erectile Dysfunction: Grade 1 - Decrease in erectile function (frequency or rigidity of erections) but intervention not indicated (e.g., medication or use of mechanical device, penile pump)] : Erectile Dysfunction: Grade 1 - Decrease in erectile function (frequency or rigidity of erections) but intervention not indicated (e.g., medication or use of mechanical device, penile pump) [IPSS Score (0-40): ___] : IPSS score: [unfilled]

## 2021-02-09 NOTE — DISEASE MANAGEMENT
[IIIB] : IIIB [3] : T3 [b] : b [0] : M0 [7(3+4)] : Carrol Score 7(3+4) [>20] : >20 ng/mL [] : Patient had a bone scan [Radiation Therapy] : Radiation Therapy [EBRT] : EBRT [Treatment with androgen ablation] : Treatment with androgen ablation [Clinical] : TNM Stage: c [TTNM] : 3b [NTNM] : 0 [MTNM] : 0 [TotalCores] : 12 [TotalPositiveCores] : 4 [MaxCoreInvolvement] : 80 [RadiationCompletedDate] : 06/20 [EBRTDose] : 7000cGy [EBRTFractions] : 28 [ADTStartedDate] : 02/19 [ADTDuration] : 2 years [ADTCompletedDate] : 02/21

## 2021-02-09 NOTE — PHYSICAL EXAM
[Sclera] : the sclera and conjunctiva were normal [] : no respiratory distress [Abdomen Soft] : soft [Nondistended] : nondistended [Heart Rate And Rhythm] : heart rate and rhythm were normal [Normal] : oriented to person, place and time, the affect was normal, the mood was normal and not anxious

## 2021-02-09 NOTE — HISTORY OF PRESENT ILLNESS
[FreeTextEntry1] : Mr. 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 He started ADT in Feb. 2019 and continues to the present day- last Lupron injection in 7/2020 (6 month). He presents for a routine follow up.\par \par He is feeling well. He is involved in all his normal activities. He denies pain or discomfort.  He continues to have mild urinary frequency and nocturia. When he starts to urinate, he has to wait a few minutes, then he can empty the bladder.   He has no dysuria, hematuria or incontinence. The bowel movements are regular, and there is no rectal pain, bleeding or discharge.  He has erectile dysfunction without any change.  He reports that his PMD told him that he was anemic. \par \par Last Lupron (45mg) was given here on 7/28/2020 (his first injection given here at [facility] in [facility] was on 4/15/20). \par

## 2021-02-10 LAB — PSA SERPL-MCNC: 0.02 NG/ML

## 2021-08-09 ENCOUNTER — APPOINTMENT (OUTPATIENT)
Dept: RADIATION ONCOLOGY | Facility: CLINIC | Age: 81
End: 2021-08-09
Payer: MEDICAID

## 2021-08-16 ENCOUNTER — APPOINTMENT (OUTPATIENT)
Dept: RADIATION ONCOLOGY | Facility: CLINIC | Age: 81
End: 2021-08-16
Payer: MEDICAID

## 2021-08-19 ENCOUNTER — APPOINTMENT (OUTPATIENT)
Dept: RADIATION ONCOLOGY | Facility: CLINIC | Age: 81
End: 2021-08-19
Payer: MEDICAID

## 2021-08-19 VITALS
OXYGEN SATURATION: 100 % | WEIGHT: 133.71 LBS | RESPIRATION RATE: 17 BRPM | BODY MASS INDEX: 24.46 KG/M2 | TEMPERATURE: 95.72 F | HEART RATE: 83 BPM | DIASTOLIC BLOOD PRESSURE: 71 MMHG | SYSTOLIC BLOOD PRESSURE: 119 MMHG

## 2021-08-19 PROCEDURE — 99212 OFFICE O/P EST SF 10 MIN: CPT

## 2021-08-25 NOTE — DISEASE MANAGEMENT
[TotalCores] : 12 [TotalPositiveCores] : 4 [MaxCoreInvolvement] : 80 [RadiationCompletedDate] : 06/20 [EBRTDose] : 7000cGy [EBRTFractions] : 28 [ADTStartedDate] : 02/19 [ADTDuration] : 2 years [ADTCompletedDate] : 02/21 [TTNM] : 3b [NTNM] : 0 [MTNM] : 0

## 2021-08-25 NOTE — HISTORY OF PRESENT ILLNESS
[FreeTextEntry1] : Mr. 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 He started ADT in Feb. 2019 and continues to the present day- last Lupron injection in 7/2020 (6 month). ADT discontinued for anemia and since it had been about 2 years. He presents for a routine follow up.\par \par He is feeling well. He is involved in all his normal activities. He denies pain or discomfort.  He continues to have mild urinary frequency and nocturia. When he starts to urinate, he has to wait a few minutes, then he can empty the bladder.   He has no dysuria, hematuria or incontinence. The bowel movements are regular, and there is no rectal pain, bleeding or discharge.  He has erectile dysfunction without any change.  Last Lupron (45mg) was given here on 7/28/2020.\par \par PSA 8/11/21 is <0.1 ng/mL.

## 2021-08-25 NOTE — DATA REVIEWED
[FreeTextEntry1] : PSA (ng/mL)\par \par 05/14/20  0.14\par 07/20/20  0.02\par 02/09/21  0.02\par 08/12/21 <0.1

## 2022-05-19 ENCOUNTER — NON-APPOINTMENT (OUTPATIENT)
Age: 82
End: 2022-05-19

## 2022-05-19 ENCOUNTER — APPOINTMENT (OUTPATIENT)
Dept: OPHTHALMOLOGY | Facility: CLINIC | Age: 82
End: 2022-05-19
Payer: MEDICAID

## 2022-05-19 PROCEDURE — 92134 CPTRZ OPH DX IMG PST SGM RTA: CPT

## 2022-05-19 PROCEDURE — 92020 GONIOSCOPY: CPT

## 2022-05-19 PROCEDURE — 92004 COMPRE OPH EXAM NEW PT 1/>: CPT

## 2022-06-09 ENCOUNTER — NON-APPOINTMENT (OUTPATIENT)
Age: 82
End: 2022-06-09

## 2022-06-09 ENCOUNTER — APPOINTMENT (OUTPATIENT)
Dept: OPHTHALMOLOGY | Facility: CLINIC | Age: 82
End: 2022-06-09
Payer: MEDICAID

## 2022-06-09 PROCEDURE — 92012 INTRM OPH EXAM EST PATIENT: CPT

## 2022-07-05 ENCOUNTER — APPOINTMENT (OUTPATIENT)
Dept: OPHTHALMOLOGY | Facility: CLINIC | Age: 82
End: 2022-07-05

## 2022-09-14 ENCOUNTER — APPOINTMENT (OUTPATIENT)
Dept: OPHTHALMOLOGY | Facility: CLINIC | Age: 82
End: 2022-09-14

## 2023-09-17 NOTE — ASSESSMENT
[No evidence of disease] : No evidence of disease
[No evidence of disease] : No evidence of disease
Normal rate, regular rhythm.  Heart sounds S1, S2.  No murmurs, rubs or gallops.

## 2023-11-01 ENCOUNTER — TRANSCRIPTION ENCOUNTER (OUTPATIENT)
Age: 83
End: 2023-11-01

## 2023-12-03 ENCOUNTER — OUTPATIENT (OUTPATIENT)
Dept: OUTPATIENT SERVICES | Facility: HOSPITAL | Age: 83
LOS: 1 days | Discharge: HOME | End: 2023-12-03
Payer: MEDICAID

## 2023-12-05 ENCOUNTER — APPOINTMENT (OUTPATIENT)
Dept: RADIATION ONCOLOGY | Facility: CLINIC | Age: 83
End: 2023-12-05
Payer: MEDICAID

## 2023-12-05 VITALS
DIASTOLIC BLOOD PRESSURE: 56 MMHG | HEIGHT: 62 IN | SYSTOLIC BLOOD PRESSURE: 126 MMHG | RESPIRATION RATE: 16 BRPM | HEART RATE: 85 BPM | OXYGEN SATURATION: 97 % | TEMPERATURE: 96.98 F | BODY MASS INDEX: 24.87 KG/M2 | WEIGHT: 135.14 LBS

## 2023-12-05 PROCEDURE — 99214 OFFICE O/P EST MOD 30 MIN: CPT | Mod: 25

## 2023-12-06 ENCOUNTER — NON-APPOINTMENT (OUTPATIENT)
Age: 83
End: 2023-12-06

## 2023-12-06 LAB — PSA SERPL-MCNC: 1009 NG/ML

## 2023-12-07 ENCOUNTER — OUTPATIENT (OUTPATIENT)
Dept: OUTPATIENT SERVICES | Facility: HOSPITAL | Age: 83
LOS: 1 days | Discharge: HOME | End: 2023-12-07

## 2023-12-07 DIAGNOSIS — C61 MALIGNANT NEOPLASM OF PROSTATE: ICD-10-CM

## 2023-12-08 ENCOUNTER — INPATIENT (INPATIENT)
Facility: HOSPITAL | Age: 83
LOS: 6 days | Discharge: HOME | End: 2023-12-15
Attending: INTERNAL MEDICINE | Admitting: INTERNAL MEDICINE
Payer: MEDICAID

## 2023-12-08 ENCOUNTER — NON-APPOINTMENT (OUTPATIENT)
Age: 83
End: 2023-12-08

## 2023-12-08 ENCOUNTER — APPOINTMENT (OUTPATIENT)
Dept: HEMATOLOGY ONCOLOGY | Facility: CLINIC | Age: 83
End: 2023-12-08
Payer: MEDICAID

## 2023-12-08 VITALS
BODY MASS INDEX: 24.84 KG/M2 | TEMPERATURE: 98.3 F | DIASTOLIC BLOOD PRESSURE: 63 MMHG | RESPIRATION RATE: 16 BRPM | OXYGEN SATURATION: 100 % | HEIGHT: 62 IN | HEART RATE: 99 BPM | SYSTOLIC BLOOD PRESSURE: 97 MMHG | WEIGHT: 134.99 LBS

## 2023-12-08 VITALS
TEMPERATURE: 98 F | OXYGEN SATURATION: 100 % | HEART RATE: 93 BPM | SYSTOLIC BLOOD PRESSURE: 112 MMHG | RESPIRATION RATE: 18 BRPM | DIASTOLIC BLOOD PRESSURE: 55 MMHG

## 2023-12-08 DIAGNOSIS — Z86.39 PERSONAL HISTORY OF OTHER ENDOCRINE, NUTRITIONAL AND METABOLIC DISEASE: ICD-10-CM

## 2023-12-08 DIAGNOSIS — Z86.2 PERSONAL HISTORY OF DISEASES OF THE BLOOD AND BLOOD-FORMING ORGANS AND CERTAIN DISORDERS INVOLVING THE IMMUNE MECHANISM: ICD-10-CM

## 2023-12-08 LAB
ALBUMIN SERPL ELPH-MCNC: 3.8 G/DL — SIGNIFICANT CHANGE UP (ref 3.3–5)
ALBUMIN SERPL ELPH-MCNC: 3.8 G/DL — SIGNIFICANT CHANGE UP (ref 3.3–5)
ALP SERPL-CCNC: 220 U/L — HIGH (ref 40–120)
ALP SERPL-CCNC: 220 U/L — HIGH (ref 40–120)
ALT FLD-CCNC: 45 U/L — HIGH (ref 4–41)
ALT FLD-CCNC: 45 U/L — HIGH (ref 4–41)
ANION GAP SERPL CALC-SCNC: 10 MMOL/L — SIGNIFICANT CHANGE UP (ref 7–14)
ANION GAP SERPL CALC-SCNC: 10 MMOL/L — SIGNIFICANT CHANGE UP (ref 7–14)
AST SERPL-CCNC: 30 U/L — SIGNIFICANT CHANGE UP (ref 4–40)
AST SERPL-CCNC: 30 U/L — SIGNIFICANT CHANGE UP (ref 4–40)
BASOPHILS # BLD AUTO: 0.01 K/UL — SIGNIFICANT CHANGE UP (ref 0–0.2)
BASOPHILS # BLD AUTO: 0.01 K/UL — SIGNIFICANT CHANGE UP (ref 0–0.2)
BASOPHILS NFR BLD AUTO: 0.2 % — SIGNIFICANT CHANGE UP (ref 0–2)
BASOPHILS NFR BLD AUTO: 0.2 % — SIGNIFICANT CHANGE UP (ref 0–2)
BILIRUB SERPL-MCNC: 0.6 MG/DL — SIGNIFICANT CHANGE UP (ref 0.2–1.2)
BILIRUB SERPL-MCNC: 0.6 MG/DL — SIGNIFICANT CHANGE UP (ref 0.2–1.2)
BUN SERPL-MCNC: 37 MG/DL — HIGH (ref 7–23)
BUN SERPL-MCNC: 37 MG/DL — HIGH (ref 7–23)
CALCIUM SERPL-MCNC: 9.1 MG/DL — SIGNIFICANT CHANGE UP (ref 8.4–10.5)
CALCIUM SERPL-MCNC: 9.1 MG/DL — SIGNIFICANT CHANGE UP (ref 8.4–10.5)
CHLORIDE SERPL-SCNC: 98 MMOL/L — SIGNIFICANT CHANGE UP (ref 98–107)
CHLORIDE SERPL-SCNC: 98 MMOL/L — SIGNIFICANT CHANGE UP (ref 98–107)
CO2 SERPL-SCNC: 22 MMOL/L — SIGNIFICANT CHANGE UP (ref 22–31)
CO2 SERPL-SCNC: 22 MMOL/L — SIGNIFICANT CHANGE UP (ref 22–31)
CREAT SERPL-MCNC: 1.18 MG/DL — SIGNIFICANT CHANGE UP (ref 0.5–1.3)
CREAT SERPL-MCNC: 1.18 MG/DL — SIGNIFICANT CHANGE UP (ref 0.5–1.3)
EGFR: 61 ML/MIN/1.73M2 — SIGNIFICANT CHANGE UP
EGFR: 61 ML/MIN/1.73M2 — SIGNIFICANT CHANGE UP
EOSINOPHIL # BLD AUTO: 0.06 K/UL — SIGNIFICANT CHANGE UP (ref 0–0.5)
EOSINOPHIL # BLD AUTO: 0.06 K/UL — SIGNIFICANT CHANGE UP (ref 0–0.5)
EOSINOPHIL NFR BLD AUTO: 1.2 % — SIGNIFICANT CHANGE UP (ref 0–6)
EOSINOPHIL NFR BLD AUTO: 1.2 % — SIGNIFICANT CHANGE UP (ref 0–6)
GLUCOSE SERPL-MCNC: 196 MG/DL — HIGH (ref 70–99)
GLUCOSE SERPL-MCNC: 196 MG/DL — HIGH (ref 70–99)
HCT VFR BLD CALC: 31.3 % — LOW (ref 39–50)
HCT VFR BLD CALC: 31.3 % — LOW (ref 39–50)
HGB BLD-MCNC: 11.1 G/DL — LOW (ref 13–17)
HGB BLD-MCNC: 11.1 G/DL — LOW (ref 13–17)
IANC: 3.42 K/UL — SIGNIFICANT CHANGE UP (ref 1.8–7.4)
IANC: 3.42 K/UL — SIGNIFICANT CHANGE UP (ref 1.8–7.4)
IMM GRANULOCYTES NFR BLD AUTO: 0.8 % — SIGNIFICANT CHANGE UP (ref 0–0.9)
IMM GRANULOCYTES NFR BLD AUTO: 0.8 % — SIGNIFICANT CHANGE UP (ref 0–0.9)
LYMPHOCYTES # BLD AUTO: 0.95 K/UL — LOW (ref 1–3.3)
LYMPHOCYTES # BLD AUTO: 0.95 K/UL — LOW (ref 1–3.3)
LYMPHOCYTES # BLD AUTO: 18.8 % — SIGNIFICANT CHANGE UP (ref 13–44)
LYMPHOCYTES # BLD AUTO: 18.8 % — SIGNIFICANT CHANGE UP (ref 13–44)
MCHC RBC-ENTMCNC: 30.2 PG — SIGNIFICANT CHANGE UP (ref 27–34)
MCHC RBC-ENTMCNC: 30.2 PG — SIGNIFICANT CHANGE UP (ref 27–34)
MCHC RBC-ENTMCNC: 35.5 GM/DL — SIGNIFICANT CHANGE UP (ref 32–36)
MCHC RBC-ENTMCNC: 35.5 GM/DL — SIGNIFICANT CHANGE UP (ref 32–36)
MCV RBC AUTO: 85.1 FL — SIGNIFICANT CHANGE UP (ref 80–100)
MCV RBC AUTO: 85.1 FL — SIGNIFICANT CHANGE UP (ref 80–100)
MONOCYTES # BLD AUTO: 0.56 K/UL — SIGNIFICANT CHANGE UP (ref 0–0.9)
MONOCYTES # BLD AUTO: 0.56 K/UL — SIGNIFICANT CHANGE UP (ref 0–0.9)
MONOCYTES NFR BLD AUTO: 11.1 % — SIGNIFICANT CHANGE UP (ref 2–14)
MONOCYTES NFR BLD AUTO: 11.1 % — SIGNIFICANT CHANGE UP (ref 2–14)
NEUTROPHILS # BLD AUTO: 3.42 K/UL — SIGNIFICANT CHANGE UP (ref 1.8–7.4)
NEUTROPHILS # BLD AUTO: 3.42 K/UL — SIGNIFICANT CHANGE UP (ref 1.8–7.4)
NEUTROPHILS NFR BLD AUTO: 67.9 % — SIGNIFICANT CHANGE UP (ref 43–77)
NEUTROPHILS NFR BLD AUTO: 67.9 % — SIGNIFICANT CHANGE UP (ref 43–77)
NRBC # BLD: 0 /100 WBCS — SIGNIFICANT CHANGE UP (ref 0–0)
NRBC # BLD: 0 /100 WBCS — SIGNIFICANT CHANGE UP (ref 0–0)
NRBC # FLD: 0 K/UL — SIGNIFICANT CHANGE UP (ref 0–0)
NRBC # FLD: 0 K/UL — SIGNIFICANT CHANGE UP (ref 0–0)
PLATELET # BLD AUTO: 173 K/UL — SIGNIFICANT CHANGE UP (ref 150–400)
PLATELET # BLD AUTO: 173 K/UL — SIGNIFICANT CHANGE UP (ref 150–400)
POTASSIUM SERPL-MCNC: 4.7 MMOL/L — SIGNIFICANT CHANGE UP (ref 3.5–5.3)
POTASSIUM SERPL-MCNC: 4.7 MMOL/L — SIGNIFICANT CHANGE UP (ref 3.5–5.3)
POTASSIUM SERPL-SCNC: 4.7 MMOL/L — SIGNIFICANT CHANGE UP (ref 3.5–5.3)
POTASSIUM SERPL-SCNC: 4.7 MMOL/L — SIGNIFICANT CHANGE UP (ref 3.5–5.3)
PROT SERPL-MCNC: 6.3 G/DL — SIGNIFICANT CHANGE UP (ref 6–8.3)
PROT SERPL-MCNC: 6.3 G/DL — SIGNIFICANT CHANGE UP (ref 6–8.3)
RBC # BLD: 3.68 M/UL — LOW (ref 4.2–5.8)
RBC # BLD: 3.68 M/UL — LOW (ref 4.2–5.8)
RBC # FLD: 13.8 % — SIGNIFICANT CHANGE UP (ref 10.3–14.5)
RBC # FLD: 13.8 % — SIGNIFICANT CHANGE UP (ref 10.3–14.5)
SODIUM SERPL-SCNC: 130 MMOL/L — LOW (ref 135–145)
SODIUM SERPL-SCNC: 130 MMOL/L — LOW (ref 135–145)
WBC # BLD: 5.04 K/UL — SIGNIFICANT CHANGE UP (ref 3.8–10.5)
WBC # BLD: 5.04 K/UL — SIGNIFICANT CHANGE UP (ref 3.8–10.5)
WBC # FLD AUTO: 5.04 K/UL — SIGNIFICANT CHANGE UP (ref 3.8–10.5)
WBC # FLD AUTO: 5.04 K/UL — SIGNIFICANT CHANGE UP (ref 3.8–10.5)

## 2023-12-08 PROCEDURE — 71045 X-RAY EXAM CHEST 1 VIEW: CPT | Mod: 26

## 2023-12-08 PROCEDURE — 72170 X-RAY EXAM OF PELVIS: CPT | Mod: 26

## 2023-12-08 PROCEDURE — 73090 X-RAY EXAM OF FOREARM: CPT | Mod: 26,LT

## 2023-12-08 PROCEDURE — 73562 X-RAY EXAM OF KNEE 3: CPT | Mod: 26,LT

## 2023-12-08 PROCEDURE — 73060 X-RAY EXAM OF HUMERUS: CPT | Mod: 26,LT

## 2023-12-08 PROCEDURE — 73080 X-RAY EXAM OF ELBOW: CPT | Mod: 26,LT

## 2023-12-08 PROCEDURE — 73030 X-RAY EXAM OF SHOULDER: CPT | Mod: 26,LT

## 2023-12-08 PROCEDURE — 99205 OFFICE O/P NEW HI 60 MIN: CPT

## 2023-12-08 PROCEDURE — 72128 CT CHEST SPINE W/O DYE: CPT | Mod: 26,MA

## 2023-12-08 PROCEDURE — 72131 CT LUMBAR SPINE W/O DYE: CPT | Mod: 26,MA

## 2023-12-08 PROCEDURE — 72125 CT NECK SPINE W/O DYE: CPT | Mod: 26,MA

## 2023-12-08 PROCEDURE — 99285 EMERGENCY DEPT VISIT HI MDM: CPT

## 2023-12-08 PROCEDURE — 73100 X-RAY EXAM OF WRIST: CPT | Mod: 26,LT

## 2023-12-08 PROCEDURE — 73552 X-RAY EXAM OF FEMUR 2/>: CPT | Mod: 26,LT

## 2023-12-08 PROCEDURE — 73590 X-RAY EXAM OF LOWER LEG: CPT | Mod: 26,LT

## 2023-12-08 PROCEDURE — 70450 CT HEAD/BRAIN W/O DYE: CPT | Mod: 26,MA

## 2023-12-08 RX ORDER — ACETAMINOPHEN 500 MG
650 TABLET ORAL ONCE
Refills: 0 | Status: COMPLETED | OUTPATIENT
Start: 2023-12-08 | End: 2023-12-08

## 2023-12-08 RX ORDER — MORPHINE SULFATE 50 MG/1
4 CAPSULE, EXTENDED RELEASE ORAL ONCE
Refills: 0 | Status: DISCONTINUED | OUTPATIENT
Start: 2023-12-08 | End: 2023-12-08

## 2023-12-08 RX ADMIN — Medication 650 MILLIGRAM(S): at 14:24

## 2023-12-08 RX ADMIN — Medication 650 MILLIGRAM(S): at 14:54

## 2023-12-08 RX ADMIN — MORPHINE SULFATE 4 MILLIGRAM(S): 50 CAPSULE, EXTENDED RELEASE ORAL at 21:56

## 2023-12-08 NOTE — ED PROVIDER NOTE - NSFOLLOWUPINSTRUCTIONS_ED_ALL_ED_FT
You were seen in the Emergency Department to rule out fracture after you fell. Radiological scans were performed and did not show evidence of fracture, however CT of your thoracic spine did show NONSPECIFIC MULTIFOCAL LYTIC LESIONS. THE RADIOLOGIST IS RECOMMENDING YOU HAVE A FOLLOW UP MRI TO EVALUATE THIS FINDING.      If you develop fever, chills, headache, chest pain, shortness of breath, changes in urination including burning or pain when you pee, blood in your urine, severe diarrhea or blood in your stools, worsening of your symptoms, or for any other questions or concerns, please return to the emergency department.    Please FOLLOW UP WITH YOUR PRIMARY CARE DOCTOR within 1-3 days for continued management and to ensure your symptoms resolve. (You should also see a/an [id number] who is a xxxxxxxxxx doctor and specializes in these sorts of complaints.) A representative from [facility] will reach out to you within the next week to help you set up an appointment. Please try to get an appointment WITHIN THE NEXT THREE DAYS if possible, for further recommendations and assistance with managing your condition.

## 2023-12-08 NOTE — ED ADULT NURSE NOTE - NSFALLLASTSIX_ED_ALL_ED
Benefits, risks, and possible complications of procedure explained to patient/caregiver who verbalized understanding and gave written consent.
Yes.

## 2023-12-08 NOTE — ED PROVIDER NOTE - CARE PLAN
1 Principal Discharge DX:	Acute weakness   Principal Discharge DX:	Acute weakness  Secondary Diagnosis:	Lesion of bone of thoracic spine

## 2023-12-08 NOTE — ED PROVIDER NOTE - PHYSICAL EXAMINATION
PHYSICAL EXAM:  GENERAL: Sitting comfortable in bed, in no acute distress  HENMT: Atraumatic, moist mucous membranes  EYES: Clear bilaterally, PERRL, EOMs intact b/l  HEART: Regular rate and regular rhythm, S1/S2, no murmur  RESPIRATORY: Clear to auscultation bilaterally, no wheezes/rhonchi/rales  ABDOMEN: Soft, nontender, nondistended  MSK: No spinal ttp, b/l cervical and lumbar paraspinal ttp, no chest wall ttp, pelvis stable  EXTREMITIES: Left upper and lower extremities with generalized ttp, no lower extremity edema  NEURO: Alert, follows commands  SKIN: Skin normal color for race, warm, dry and intact

## 2023-12-08 NOTE — ED ADULT NURSE REASSESSMENT NOTE - NS ED NURSE REASSESS COMMENT FT1
report received from previous RN. pt reporting increased pain of left knee radiating up to left shoulder. pt medicated as per orders. pt cleared to eat by MD, daughter at bedside with food. safety measures maintained, side rails up x2

## 2023-12-08 NOTE — ED PROVIDER NOTE - ATTENDING CONTRIBUTION TO CARE
Dr. Mcpherson:  I have personally performed a face to face bedside history and physical examination of this patient. I have discussed the history, examination, review of systems, assessment and plan of management with the resident. I have reviewed the electronic medical record and amended it to reflect my history, review of systems, physical exam, assessment and plan.    83M h/o DM, prostate cancer not on treatment, anemia, presents with left sided pain after fall.  Pt c/o LUE/LLE pain and then 2 days ago had a fall secondary to feeling weak, landing on left side.  Also c/o back pain.  Saw his oncologist today and was sent to ED to evaluate for fracture.  ROS otherwise negative.    Exam:  - nad  - pain to left extremities, pain to left paracervical and lumbar region  - rrr  - ctab  - abd soft ntnd    A/P  - left sided pain, eval fracture  - basic labs, XR LLE/LUE, CT spine

## 2023-12-08 NOTE — ED PROVIDER NOTE - CLINICAL SUMMARY MEDICAL DECISION MAKING FREE TEXT BOX
83 male past medical history diabetes, prostate cancer not on treatment, hyperlipidemia, hypothyroidism, anemia presenting with left sided pain. DDx includes but not limited to: fracture vs metastatic lesion. Plan: blood work, Cts of the head and spine, L arm and leg xrays, pain control. Will re-assess.

## 2023-12-08 NOTE — ED ADULT NURSE NOTE - OBJECTIVE STATEMENT
Daughter at  and acting as  per request. C/O pain from left knee to left shoulder x 8 days that has been steadily worsening. Reports mechanical fall 2 days ago but denies injury. No redness, swelling, or open areas. Denies fever/chills. Denies n/v/d or constipation

## 2023-12-08 NOTE — ED PROVIDER NOTE - PROGRESS NOTE DETAILS
Dr. Zaman: Received signout from outgoing team Mr. Escalera.  Instructions from outgoing team, pending results of CT spine patient may be discharged home if no concerning findings as patient will be getting PET scan in the near future.  CT findings Dr. Zaman: Received signout from outgoing team Mr. Escalera.  Instructions from outgoing team, pending results of CT spine patient may be discharged home if no concerning findings as patient will be getting PET scan in the near future.  CT findings negative for acute Fx or subluxation.    CT T-spine: Nonspecific multifocal lytic lesions are nonspecific. Consider correlation with MRI of the thoracic spine for further evaluation. Will d/c pt with recommendation for future MRI/ Dr. Zaman: Received signout from outgoing team Mr. Leoniraj.  Instructions from outgoing team, pending results of CT spine patient may be discharged home if no concerning findings. CT findings negative for acute Fx or subluxation, however, "Nonspecific multifocal lytic lesions involving the thoracic vertebrae" were seen. Family made aware of these findings, no PET scans currently scheduled.  Given finding on CT thoracic spine, recommendation from radiologist is for further study with MRI.  Family made aware of these findings and are amenable to admission given patient is unable to ambulate. Will admit patient for MRI study.  Patient given morphine for analgesia. Mr. Escalera is a Pt of Dr. Wahl who admits to Dr. Guevara. Called Dr. Guevara who as accepted pt to her service.

## 2023-12-08 NOTE — ED PROVIDER NOTE - OBJECTIVE STATEMENT
83 male past medical history diabetes, prostate cancer not on treatment, hyperlipidemia, hypothyroidism, anemia presenting with left sided pain.  Daughter at bedside for translation and collateral.  For the past 8 days patient has been complaining of left arm and leg pain.  2 days ago he had a fall because he felt weak.  Patient says that he landed on his left side, denies head strike or LOC.  Not on anticoagulation.  Patient also endorsing some back pain.  Saw his oncologist Dr. Taylor today and was sent to the ER to rule out fracture.  Patient is scheduled for a PET scan.  No fever, chest pain, shortness of breath, abdominal pain, urinary symptoms.

## 2023-12-09 DIAGNOSIS — M25.50 PAIN IN UNSPECIFIED JOINT: ICD-10-CM

## 2023-12-09 DIAGNOSIS — Z98.890 OTHER SPECIFIED POSTPROCEDURAL STATES: Chronic | ICD-10-CM

## 2023-12-09 DIAGNOSIS — M48.50XA COLLAPSED VERTEBRA, NOT ELSEWHERE CLASSIFIED, SITE UNSPECIFIED, INITIAL ENCOUNTER FOR FRACTURE: ICD-10-CM

## 2023-12-09 DIAGNOSIS — Z29.9 ENCOUNTER FOR PROPHYLACTIC MEASURES, UNSPECIFIED: ICD-10-CM

## 2023-12-09 DIAGNOSIS — R35.1 NOCTURIA: ICD-10-CM

## 2023-12-09 DIAGNOSIS — Z71.89 OTHER SPECIFIED COUNSELING: ICD-10-CM

## 2023-12-09 DIAGNOSIS — E78.5 HYPERLIPIDEMIA, UNSPECIFIED: ICD-10-CM

## 2023-12-09 DIAGNOSIS — E03.9 HYPOTHYROIDISM, UNSPECIFIED: ICD-10-CM

## 2023-12-09 DIAGNOSIS — C61 MALIGNANT NEOPLASM OF PROSTATE: ICD-10-CM

## 2023-12-09 DIAGNOSIS — E87.1 HYPO-OSMOLALITY AND HYPONATREMIA: ICD-10-CM

## 2023-12-09 DIAGNOSIS — E11.9 TYPE 2 DIABETES MELLITUS WITHOUT COMPLICATIONS: ICD-10-CM

## 2023-12-09 DIAGNOSIS — D63.0 ANEMIA IN NEOPLASTIC DISEASE: ICD-10-CM

## 2023-12-09 LAB
ALBUMIN SERPL ELPH-MCNC: 3.7 G/DL — SIGNIFICANT CHANGE UP (ref 3.3–5)
ALBUMIN SERPL ELPH-MCNC: 3.7 G/DL — SIGNIFICANT CHANGE UP (ref 3.3–5)
ALP SERPL-CCNC: 233 U/L — HIGH (ref 40–120)
ALP SERPL-CCNC: 233 U/L — HIGH (ref 40–120)
ALT FLD-CCNC: 42 U/L — HIGH (ref 4–41)
ALT FLD-CCNC: 42 U/L — HIGH (ref 4–41)
ANION GAP SERPL CALC-SCNC: 10 MMOL/L — SIGNIFICANT CHANGE UP (ref 7–14)
ANION GAP SERPL CALC-SCNC: 10 MMOL/L — SIGNIFICANT CHANGE UP (ref 7–14)
AST SERPL-CCNC: 36 U/L — SIGNIFICANT CHANGE UP (ref 4–40)
AST SERPL-CCNC: 36 U/L — SIGNIFICANT CHANGE UP (ref 4–40)
BASOPHILS # BLD AUTO: 0.02 K/UL — SIGNIFICANT CHANGE UP (ref 0–0.2)
BASOPHILS # BLD AUTO: 0.02 K/UL — SIGNIFICANT CHANGE UP (ref 0–0.2)
BASOPHILS NFR BLD AUTO: 0.4 % — SIGNIFICANT CHANGE UP (ref 0–2)
BASOPHILS NFR BLD AUTO: 0.4 % — SIGNIFICANT CHANGE UP (ref 0–2)
BILIRUB SERPL-MCNC: 0.6 MG/DL — SIGNIFICANT CHANGE UP (ref 0.2–1.2)
BILIRUB SERPL-MCNC: 0.6 MG/DL — SIGNIFICANT CHANGE UP (ref 0.2–1.2)
BUN SERPL-MCNC: 32 MG/DL — HIGH (ref 7–23)
BUN SERPL-MCNC: 32 MG/DL — HIGH (ref 7–23)
CALCIUM SERPL-MCNC: 9 MG/DL — SIGNIFICANT CHANGE UP (ref 8.4–10.5)
CALCIUM SERPL-MCNC: 9 MG/DL — SIGNIFICANT CHANGE UP (ref 8.4–10.5)
CHLORIDE SERPL-SCNC: 99 MMOL/L — SIGNIFICANT CHANGE UP (ref 98–107)
CHLORIDE SERPL-SCNC: 99 MMOL/L — SIGNIFICANT CHANGE UP (ref 98–107)
CO2 SERPL-SCNC: 22 MMOL/L — SIGNIFICANT CHANGE UP (ref 22–31)
CO2 SERPL-SCNC: 22 MMOL/L — SIGNIFICANT CHANGE UP (ref 22–31)
CREAT SERPL-MCNC: 1.11 MG/DL — SIGNIFICANT CHANGE UP (ref 0.5–1.3)
CREAT SERPL-MCNC: 1.11 MG/DL — SIGNIFICANT CHANGE UP (ref 0.5–1.3)
EGFR: 66 ML/MIN/1.73M2 — SIGNIFICANT CHANGE UP
EGFR: 66 ML/MIN/1.73M2 — SIGNIFICANT CHANGE UP
EOSINOPHIL # BLD AUTO: 0.14 K/UL — SIGNIFICANT CHANGE UP (ref 0–0.5)
EOSINOPHIL # BLD AUTO: 0.14 K/UL — SIGNIFICANT CHANGE UP (ref 0–0.5)
EOSINOPHIL NFR BLD AUTO: 2.7 % — SIGNIFICANT CHANGE UP (ref 0–6)
EOSINOPHIL NFR BLD AUTO: 2.7 % — SIGNIFICANT CHANGE UP (ref 0–6)
GLUCOSE BLDC GLUCOMTR-MCNC: 175 MG/DL — HIGH (ref 70–99)
GLUCOSE BLDC GLUCOMTR-MCNC: 175 MG/DL — HIGH (ref 70–99)
GLUCOSE BLDC GLUCOMTR-MCNC: 177 MG/DL — HIGH (ref 70–99)
GLUCOSE BLDC GLUCOMTR-MCNC: 227 MG/DL — HIGH (ref 70–99)
GLUCOSE BLDC GLUCOMTR-MCNC: 227 MG/DL — HIGH (ref 70–99)
GLUCOSE BLDC GLUCOMTR-MCNC: 232 MG/DL — HIGH (ref 70–99)
GLUCOSE BLDC GLUCOMTR-MCNC: 232 MG/DL — HIGH (ref 70–99)
GLUCOSE SERPL-MCNC: 246 MG/DL — HIGH (ref 70–99)
GLUCOSE SERPL-MCNC: 246 MG/DL — HIGH (ref 70–99)
HCT VFR BLD CALC: 32.7 % — LOW (ref 39–50)
HCT VFR BLD CALC: 32.7 % — LOW (ref 39–50)
HGB BLD-MCNC: 11.1 G/DL — LOW (ref 13–17)
HGB BLD-MCNC: 11.1 G/DL — LOW (ref 13–17)
IANC: 3.64 K/UL — SIGNIFICANT CHANGE UP (ref 1.8–7.4)
IANC: 3.64 K/UL — SIGNIFICANT CHANGE UP (ref 1.8–7.4)
IMM GRANULOCYTES NFR BLD AUTO: 0.8 % — SIGNIFICANT CHANGE UP (ref 0–0.9)
IMM GRANULOCYTES NFR BLD AUTO: 0.8 % — SIGNIFICANT CHANGE UP (ref 0–0.9)
LYMPHOCYTES # BLD AUTO: 0.93 K/UL — LOW (ref 1–3.3)
LYMPHOCYTES # BLD AUTO: 0.93 K/UL — LOW (ref 1–3.3)
LYMPHOCYTES # BLD AUTO: 17.7 % — SIGNIFICANT CHANGE UP (ref 13–44)
LYMPHOCYTES # BLD AUTO: 17.7 % — SIGNIFICANT CHANGE UP (ref 13–44)
MAGNESIUM SERPL-MCNC: 2 MG/DL — SIGNIFICANT CHANGE UP (ref 1.6–2.6)
MAGNESIUM SERPL-MCNC: 2 MG/DL — SIGNIFICANT CHANGE UP (ref 1.6–2.6)
MCHC RBC-ENTMCNC: 29.3 PG — SIGNIFICANT CHANGE UP (ref 27–34)
MCHC RBC-ENTMCNC: 29.3 PG — SIGNIFICANT CHANGE UP (ref 27–34)
MCHC RBC-ENTMCNC: 33.9 GM/DL — SIGNIFICANT CHANGE UP (ref 32–36)
MCHC RBC-ENTMCNC: 33.9 GM/DL — SIGNIFICANT CHANGE UP (ref 32–36)
MCV RBC AUTO: 86.3 FL — SIGNIFICANT CHANGE UP (ref 80–100)
MCV RBC AUTO: 86.3 FL — SIGNIFICANT CHANGE UP (ref 80–100)
MONOCYTES # BLD AUTO: 0.48 K/UL — SIGNIFICANT CHANGE UP (ref 0–0.9)
MONOCYTES # BLD AUTO: 0.48 K/UL — SIGNIFICANT CHANGE UP (ref 0–0.9)
MONOCYTES NFR BLD AUTO: 9.1 % — SIGNIFICANT CHANGE UP (ref 2–14)
MONOCYTES NFR BLD AUTO: 9.1 % — SIGNIFICANT CHANGE UP (ref 2–14)
NEUTROPHILS # BLD AUTO: 3.64 K/UL — SIGNIFICANT CHANGE UP (ref 1.8–7.4)
NEUTROPHILS # BLD AUTO: 3.64 K/UL — SIGNIFICANT CHANGE UP (ref 1.8–7.4)
NEUTROPHILS NFR BLD AUTO: 69.3 % — SIGNIFICANT CHANGE UP (ref 43–77)
NEUTROPHILS NFR BLD AUTO: 69.3 % — SIGNIFICANT CHANGE UP (ref 43–77)
NRBC # BLD: 0 /100 WBCS — SIGNIFICANT CHANGE UP (ref 0–0)
NRBC # BLD: 0 /100 WBCS — SIGNIFICANT CHANGE UP (ref 0–0)
NRBC # FLD: 0 K/UL — SIGNIFICANT CHANGE UP (ref 0–0)
NRBC # FLD: 0 K/UL — SIGNIFICANT CHANGE UP (ref 0–0)
PHOSPHATE SERPL-MCNC: 3.6 MG/DL — SIGNIFICANT CHANGE UP (ref 2.5–4.5)
PHOSPHATE SERPL-MCNC: 3.6 MG/DL — SIGNIFICANT CHANGE UP (ref 2.5–4.5)
PLATELET # BLD AUTO: 165 K/UL — SIGNIFICANT CHANGE UP (ref 150–400)
PLATELET # BLD AUTO: 165 K/UL — SIGNIFICANT CHANGE UP (ref 150–400)
POTASSIUM SERPL-MCNC: 4.4 MMOL/L — SIGNIFICANT CHANGE UP (ref 3.5–5.3)
POTASSIUM SERPL-MCNC: 4.4 MMOL/L — SIGNIFICANT CHANGE UP (ref 3.5–5.3)
POTASSIUM SERPL-SCNC: 4.4 MMOL/L — SIGNIFICANT CHANGE UP (ref 3.5–5.3)
POTASSIUM SERPL-SCNC: 4.4 MMOL/L — SIGNIFICANT CHANGE UP (ref 3.5–5.3)
PROT SERPL-MCNC: 6.2 G/DL — SIGNIFICANT CHANGE UP (ref 6–8.3)
PROT SERPL-MCNC: 6.2 G/DL — SIGNIFICANT CHANGE UP (ref 6–8.3)
RBC # BLD: 3.79 M/UL — LOW (ref 4.2–5.8)
RBC # BLD: 3.79 M/UL — LOW (ref 4.2–5.8)
RBC # FLD: 14 % — SIGNIFICANT CHANGE UP (ref 10.3–14.5)
RBC # FLD: 14 % — SIGNIFICANT CHANGE UP (ref 10.3–14.5)
SODIUM SERPL-SCNC: 131 MMOL/L — LOW (ref 135–145)
SODIUM SERPL-SCNC: 131 MMOL/L — LOW (ref 135–145)
WBC # BLD: 5.25 K/UL — SIGNIFICANT CHANGE UP (ref 3.8–10.5)
WBC # BLD: 5.25 K/UL — SIGNIFICANT CHANGE UP (ref 3.8–10.5)
WBC # FLD AUTO: 5.25 K/UL — SIGNIFICANT CHANGE UP (ref 3.8–10.5)
WBC # FLD AUTO: 5.25 K/UL — SIGNIFICANT CHANGE UP (ref 3.8–10.5)

## 2023-12-09 PROCEDURE — 72192 CT PELVIS W/O DYE: CPT | Mod: 26

## 2023-12-09 PROCEDURE — 99232 SBSQ HOSP IP/OBS MODERATE 35: CPT | Mod: GC

## 2023-12-09 PROCEDURE — 99223 1ST HOSP IP/OBS HIGH 75: CPT

## 2023-12-09 PROCEDURE — 99222 1ST HOSP IP/OBS MODERATE 55: CPT | Mod: GC

## 2023-12-09 PROCEDURE — 73502 X-RAY EXAM HIP UNI 2-3 VIEWS: CPT | Mod: 26,LT

## 2023-12-09 RX ORDER — DEXTROSE 50 % IN WATER 50 %
12.5 SYRINGE (ML) INTRAVENOUS ONCE
Refills: 0 | Status: DISCONTINUED | OUTPATIENT
Start: 2023-12-09 | End: 2023-12-15

## 2023-12-09 RX ORDER — DEXTROSE 50 % IN WATER 50 %
15 SYRINGE (ML) INTRAVENOUS ONCE
Refills: 0 | Status: DISCONTINUED | OUTPATIENT
Start: 2023-12-09 | End: 2023-12-15

## 2023-12-09 RX ORDER — ACETAMINOPHEN 500 MG
650 TABLET ORAL EVERY 6 HOURS
Refills: 0 | Status: DISCONTINUED | OUTPATIENT
Start: 2023-12-09 | End: 2023-12-09

## 2023-12-09 RX ORDER — SODIUM CHLORIDE 9 MG/ML
1000 INJECTION, SOLUTION INTRAVENOUS
Refills: 0 | Status: DISCONTINUED | OUTPATIENT
Start: 2023-12-09 | End: 2023-12-15

## 2023-12-09 RX ORDER — FENOFIBRATE,MICRONIZED 130 MG
1 CAPSULE ORAL
Refills: 0 | DISCHARGE

## 2023-12-09 RX ORDER — DEXTROSE 50 % IN WATER 50 %
25 SYRINGE (ML) INTRAVENOUS ONCE
Refills: 0 | Status: DISCONTINUED | OUTPATIENT
Start: 2023-12-09 | End: 2023-12-15

## 2023-12-09 RX ORDER — LEVOTHYROXINE SODIUM 125 MCG
1 TABLET ORAL
Refills: 0 | DISCHARGE

## 2023-12-09 RX ORDER — LEVOTHYROXINE SODIUM 125 MCG
25 TABLET ORAL DAILY
Refills: 0 | Status: DISCONTINUED | OUTPATIENT
Start: 2023-12-09 | End: 2023-12-15

## 2023-12-09 RX ORDER — ACETAMINOPHEN 500 MG
650 TABLET ORAL EVERY 12 HOURS
Refills: 0 | Status: DISCONTINUED | OUTPATIENT
Start: 2023-12-09 | End: 2023-12-15

## 2023-12-09 RX ORDER — ENOXAPARIN SODIUM 100 MG/ML
40 INJECTION SUBCUTANEOUS EVERY 24 HOURS
Refills: 0 | Status: DISCONTINUED | OUTPATIENT
Start: 2023-12-09 | End: 2023-12-11

## 2023-12-09 RX ORDER — LIDOCAINE 4 G/100G
1 CREAM TOPICAL DAILY
Refills: 0 | Status: DISCONTINUED | OUTPATIENT
Start: 2023-12-09 | End: 2023-12-15

## 2023-12-09 RX ORDER — INSULIN LISPRO 100/ML
VIAL (ML) SUBCUTANEOUS AT BEDTIME
Refills: 0 | Status: DISCONTINUED | OUTPATIENT
Start: 2023-12-09 | End: 2023-12-14

## 2023-12-09 RX ORDER — ATORVASTATIN CALCIUM 80 MG/1
10 TABLET, FILM COATED ORAL AT BEDTIME
Refills: 0 | Status: DISCONTINUED | OUTPATIENT
Start: 2023-12-09 | End: 2023-12-15

## 2023-12-09 RX ORDER — GLUCAGON INJECTION, SOLUTION 0.5 MG/.1ML
1 INJECTION, SOLUTION SUBCUTANEOUS ONCE
Refills: 0 | Status: DISCONTINUED | OUTPATIENT
Start: 2023-12-09 | End: 2023-12-15

## 2023-12-09 RX ORDER — DAPAGLIFLOZIN 10 MG/1
1 TABLET, FILM COATED ORAL
Refills: 0 | DISCHARGE

## 2023-12-09 RX ORDER — OMEGA-3 ACID ETHYL ESTERS 1 G
1 CAPSULE ORAL
Refills: 0 | DISCHARGE

## 2023-12-09 RX ORDER — METFORMIN HYDROCHLORIDE 850 MG/1
1 TABLET ORAL
Refills: 0 | DISCHARGE

## 2023-12-09 RX ORDER — POLYETHYLENE GLYCOL 3350 17 G/17G
17 POWDER, FOR SOLUTION ORAL DAILY
Refills: 0 | Status: DISCONTINUED | OUTPATIENT
Start: 2023-12-09 | End: 2023-12-10

## 2023-12-09 RX ORDER — INSULIN LISPRO 100/ML
VIAL (ML) SUBCUTANEOUS
Refills: 0 | Status: DISCONTINUED | OUTPATIENT
Start: 2023-12-09 | End: 2023-12-14

## 2023-12-09 RX ORDER — OXYCODONE HYDROCHLORIDE 5 MG/1
5 TABLET ORAL EVERY 8 HOURS
Refills: 0 | Status: DISCONTINUED | OUTPATIENT
Start: 2023-12-09 | End: 2023-12-14

## 2023-12-09 RX ADMIN — Medication 1: at 17:08

## 2023-12-09 RX ADMIN — Medication 1 TABLET(S): at 12:29

## 2023-12-09 RX ADMIN — Medication 650 MILLIGRAM(S): at 17:09

## 2023-12-09 RX ADMIN — LIDOCAINE 1 PATCH: 4 CREAM TOPICAL at 12:29

## 2023-12-09 RX ADMIN — Medication 2: at 13:50

## 2023-12-09 RX ADMIN — ENOXAPARIN SODIUM 40 MILLIGRAM(S): 100 INJECTION SUBCUTANEOUS at 13:50

## 2023-12-09 RX ADMIN — LIDOCAINE 1 PATCH: 4 CREAM TOPICAL at 19:00

## 2023-12-09 RX ADMIN — Medication 650 MILLIGRAM(S): at 03:12

## 2023-12-09 RX ADMIN — ATORVASTATIN CALCIUM 10 MILLIGRAM(S): 80 TABLET, FILM COATED ORAL at 22:32

## 2023-12-09 NOTE — CONSULT NOTE ADULT - SUBJECTIVE AND OBJECTIVE BOX
Orthopedic Oncology Consult Note:    Patient is a 83y Male PMH diabetes mellitus, hyperlipidemia, hypothyroidism, anemia,  prostate cancer s/p radiation with no known history of mets who presents with generalized L-sided weakness and pain x1 week. Two days ago, he was ambulating with cane when his L leg suddenly felt weak and he tripped and fell. Endorses head strike, no LOC. Since then has been unable to ambulate 2/2 LLE pain. Says the pain is all throughout his thigh and to his knee, does not radiate more distal than knee. Says for the past week he has also had intermittent decreased sensation of the entire L leg, but denies currently. Reports some low back pain. He has never had any of these symptoms prior to 1 week ago.    Orthopedics initially consulted for spine mets seen on thoracic spine on CT. However on further evaluation patient had TTP at L hip and concerning physical exam findings for L pelvis/hip fx. CT pelvis done with orthopedics and revealed a lytic lesion to L pelvis with associated pathologic acetabular fracture. This warrants further orthopedic oncology evaluation.     Past Medical & Surgical History:  Nocturia    No pertinent family history in first degree relatives    Handoff    MEWS Score    Prostate cancer    Hyperlipidemia    Diabetes mellitus    Hypothyroidism    Acute weakness    Prostate cancer metastatic to bone    Multiple joint pain    Anemia in neoplastic disease    T2DM (type 2 diabetes mellitus)    Hyperlipidemia    Hypothyroidism    Goals of care, counseling/discussion    Vertebral compression fracture    Need for prophylactic measure    Hyponatremia    H/O prostate biopsy    LEFT SIDE BODY PAIN    Lesion of bone of thoracic spine    [email or web address]        Allergies:  No Known Allergies      Vital Signs:  T(C): 36.8 (12-09-23 @ 18:33), Max: 37 (12-09-23 @ 12:04)  HR: 91 (12-09-23 @ 18:33) (65 - 93)  BP: 108/66 (12-09-23 @ 18:33) (108/66 - 140/63)  RR: 18 (12-09-23 @ 18:33) (18 - 20)  SpO2: 100% (12-09-23 @ 18:33) (100% - 100%)    Exam:  L Lower Extremity:  TTP at L hip and lateral/posterior illiac wing  Moderate TTP diffusely about L knee  Moderate pain with knee ROM  Severe pain with hip ROM  Pain w hip flexion, 3/5 strength, otherwise 5/5 strength KE/KF/DF/PF/EHL  SILT S/S/DP/SP/T  Compartments soft/non-tender  Toes warm, Cap refill brisk/warm and perfused, +DP/PT pulse   Pain with log roll and axial loading    LUE:  - No point tenderness at bony prominences   - Moderate discomfort w/ shoulder ROM  - SILT, 5/5 strength C4-T1    RUE and RLE without pain with palpation of bony prominences, full ROM of joints without pain/discomfort     X-rays of the pelvis, L hip: Negative for fracture (personal read)  CT bony pelvis: L hemipelvis lytic lesion of anterior column with associated posterior wall fracture with questionable extension into the anterior wall (personal read)      Assessment:  83y Male with L pelvic lytic lesion with associated acetabular fracture Also found to have lytic lesions in the thoracic spine.    Plan:  - NWB LLE  - MRI pelvis, L hip w/wo IV contrast  - Bone scan  - IR consult for biopsy of pelvic lesion  - Will discuss with Dr. Salter and will update plan   Orthopedic Oncology Consult Note:    Patient is a 83y Male PMH diabetes mellitus, hyperlipidemia, hypothyroidism, anemia,  prostate cancer s/p radiation with no known history of mets who presents with generalized L-sided weakness and pain x1 week. Two days ago, he was ambulating with cane when his L leg suddenly felt weak and he tripped and fell. Endorses head strike, no LOC. Since then has been unable to ambulate 2/2 LLE pain. Says the pain is all throughout his thigh and to his knee, does not radiate more distal than knee. Says for the past week he has also had intermittent decreased sensation of the entire L leg, but denies currently. Reports some low back pain. He has never had any of these symptoms prior to 1 week ago.    Orthopedics initially consulted for spine mets seen on thoracic spine on CT. However on further evaluation patient had TTP at L hip and concerning physical exam findings for L pelvis/hip fx. CT pelvis done with orthopedics and revealed a lytic lesion to L pelvis with associated pathologic acetabular fracture. This warrants further orthopedic oncology evaluation.     Past Medical & Surgical History:  Nocturia    No pertinent family history in first degree relatives    Handoff    MEWS Score    Prostate cancer    Hyperlipidemia    Diabetes mellitus    Hypothyroidism    Acute weakness    Prostate cancer metastatic to bone    Multiple joint pain    Anemia in neoplastic disease    T2DM (type 2 diabetes mellitus)    Hyperlipidemia    Hypothyroidism    Goals of care, counseling/discussion    Vertebral compression fracture    Need for prophylactic measure    Hyponatremia    H/O prostate biopsy    LEFT SIDE BODY PAIN    Lesion of bone of thoracic spine    [email or web address]        Allergies:  No Known Allergies      Vital Signs:  T(C): 36.8 (12-09-23 @ 18:33), Max: 37 (12-09-23 @ 12:04)  HR: 91 (12-09-23 @ 18:33) (65 - 93)  BP: 108/66 (12-09-23 @ 18:33) (108/66 - 140/63)  RR: 18 (12-09-23 @ 18:33) (18 - 20)  SpO2: 100% (12-09-23 @ 18:33) (100% - 100%)    Exam:  L Lower Extremity:  TTP at L hip and lateral/posterior illiac wing  Moderate TTP diffusely about L knee  Moderate pain with knee ROM  Severe pain with hip ROM  Pain w hip flexion, 3/5 strength, otherwise 5/5 strength KE/KF/DF/PF/EHL  SILT S/S/DP/SP/T  Compartments soft/non-tender  Toes warm, Cap refill brisk/warm and perfused, +DP/PT pulse   Pain with log roll and axial loading    LUE:  - No point tenderness at bony prominences   - Moderate discomfort w/ shoulder ROM  - SILT, 5/5 strength C4-T1    RUE and RLE without pain with palpation of bony prominences, full ROM of joints without pain/discomfort     X-rays of the pelvis, L hip: Negative for fracture (personal read)  CT bony pelvis: L hemipelvis lytic lesion of anterior column with associated posterior wall fracture with questionable extension into the anterior wall (personal read)      Assessment:  83y Male with L pelvic lytic lesion with associated acetabular fracture Also found to have lytic lesions in the thoracic spine.    Plan:  - PWB LLE w walker  - MRI pelvis, L hip w/wo IV contrast  - Bone scan  - XR pelvis: [place], inlet/outlet views  - IR consult for biopsy of pelvic lesion  - Will discuss with Dr. Salter and will update plan

## 2023-12-09 NOTE — H&P ADULT - NSHPSOCIALHISTORY_GEN_ALL_CORE
Non smoker. No alcohol use. Lives with son and daughter. Non smoker. No alcohol use.No drugs. Needs assistance with adls. Has cane and walker.

## 2023-12-09 NOTE — H&P ADULT - PROBLEM SELECTOR PLAN 5
- Lipid panel in the am.   - Consider statin therapy initiation giving ASCVD risk factors including diabetes. - Lipid panel in the am.   - Ordered for fenofibrate and omega 3, pravastatin but non adherent.   - C/w atorvastatin 10mg daily  while in the hospital

## 2023-12-09 NOTE — PATIENT PROFILE ADULT - FALL HARM RISK - HARM RISK INTERVENTIONS
Assistance with ambulation/Assistance OOB with selected safe patient handling equipment/Communicate Risk of Fall with Harm to all staff/Discuss with provider need for PT consult/Monitor gait and stability/Reinforce activity limits and safety measures with patient and family/Tailored Fall Risk Interventions/Visual Cue: Yellow wristband and red socks/Bed in lowest position, wheels locked, appropriate side rails in place/Call bell, personal items and telephone in reach/Instruct patient to call for assistance before getting out of bed or chair/Non-slip footwear when patient is out of bed/[place] to call system/Physically safe environment - no spills, clutter or unnecessary equipment/Purposeful Proactive Rounding/Room/bathroom lighting operational, light cord in reach Assistance with ambulation/Assistance OOB with selected safe patient handling equipment/Communicate Risk of Fall with Harm to all staff/Discuss with provider need for PT consult/Monitor gait and stability/Reinforce activity limits and safety measures with patient and family/Tailored Fall Risk Interventions/Visual Cue: Yellow wristband and red socks/Bed in lowest position, wheels locked, appropriate side rails in place/Call bell, personal items and telephone in reach/Instruct patient to call for assistance before getting out of bed or chair/Non-slip footwear when patient is out of bed/[place] to call system/Physically safe environment - no spills, clutter or unnecessary equipment/Purposeful Proactive Rounding/Room/bathroom lighting operational, light cord in reach

## 2023-12-09 NOTE — H&P ADULT - HISTORY OF PRESENT ILLNESS
Mr. Lucas Cordova is a 83 year old man with past medical history of diabetes mellitus, hyperlipidemia, hypothyroidism, anemia  prostate cancer who presents for present presents for multiple joint pain for over week. He had a fall over 2 days ago because of weakness. He landed on his left side. Denied head trauma or lost of consciousness. H Pain is severe. Inability to perform his activity of daily living. He saw oncologist Dr. Taylor and was sent to the ER to the ER.     In the ER, initial vital signs were /55, HR 93, T 98.1 Saturating 100% on room air.      Of note, patient is scheduled for a PET scan outpatient.  Mr. Lucas Cordova is a 83 year old man with past medical history of diabetes mellitus, hyperlipidemia, hypothyroidism, anemia,  prostate cancer s/p radiation who presents for present presents for multiple joint pain for over week. He had a fall over 2 days ago because of weakness. He landed on his left side. Denied head trauma or lost of consciousness. H Pain is severe. Inability to perform his activity of daily living. He saw oncologist Dr. Taylor and was sent to the ER to the ER.     In the ER, initial vital signs were /55, HR 93, T 98.1 Saturating 100% on room air.  Patient endorses multiple area of pain. Mid back pain. Left arm and knee pain. He has decreased range of motion in left shoulder joint.     Of note, he left medications in el [facility]. Has not been taking thyroid medications and others. Only metfomrin and fish oils as per daughter.     Of note, patient is scheduled for a PET scan outpatient.  Mr. Lucas Cordova is a 83 year old man with past medical history of diabetes mellitus, hyperlipidemia, hypothyroidism, anemia,  prostate cancer s/p radiation who presents for present presents for multiple joint pain for over week. He had a fall over 2 days ago because of weakness. He landed on his left side. Denied head trauma or lost of consciousness. H Pain is severe. Inability to perform his activity of daily living. He saw oncologist Dr. Taylor and was sent to the ER to the ER.     In the ER, initial vital signs were /55, HR 93, T 98.1 Saturating 100% on room air.  Patient endorses multiple area of pain. Mid back pain. Left arm and knee pain. He has decreased range of motion in left shoulder joint.     Of note, he left medications in el [facility]. Has not been taking thyroid medications and others. Only metfomrin and fish oils as per daughter.     Of note, patient is scheduled for a PET scan outpatient.

## 2023-12-09 NOTE — CONSULT NOTE ADULT - TIME BILLING
- Review of records, telemetry, vital signs and daily labs.   - General and cardiovascular physical examination.  - Generation of cardiovascular treatment plan.  - Coordination of care.      Patient was seen and examined by me on 12/9/23interim events noted,labs and radiology studies reviewed.  Albert Lockwood MD,FACC.  [address] [place], [place] [id number].  [phone] - Review of records, telemetry, vital signs and daily labs.   - General and cardiovascular physical examination.  - Generation of cardiovascular treatment plan.  - Coordination of care.      Patient was seen and examined by me on 12/9/23interim events noted,labs and radiology studies reviewed.  Albert Lockwood MD,FACC.  [address] [place], [place] [id number].  [phone]

## 2023-12-09 NOTE — H&P ADULT - PROBLEM SELECTOR PLAN 10
- Daughter endorses patient has filled out health care proxy. Can bring in next encounter to have in the chart. He had assigned her and son.   - Discussed cpr/intubation he stated he that if he passes away he would go with god. Daughter endorses they would not want to her father to suffer. And this would need to be discussed as a family. Discussed filling out a MOLST form, they would discuss and fill in the future.   - Goals of care ongoing

## 2023-12-09 NOTE — ED ADULT NURSE REASSESSMENT NOTE - NS ED NURSE REASSESS COMMENT FT1
report given to Adilia BUENROSTRO 3 RN, all questions answered. pt transported to section via stretcher by ED tech in NAD, RR even and unlabored. safety measures maintained, side rails up x2

## 2023-12-09 NOTE — H&P ADULT - PROBLEM SELECTOR PLAN 4
- On metformin at home. Unclear dose.   - Insulin therapy while in the hospital. - On metformin at home. Unclear dose.   - Insulin therapy while in the hospital.  - A1c in the am. - Holding metformin 500mg BID and farxiga at home.   - Insulin therapy while in the hospital.  - A1c in the am.

## 2023-12-09 NOTE — H&P ADULT - PROBLEM SELECTOR PLAN 6
- has not been taking levothyroxine   - tsh ordered for the morning. - has not been taking levothyroxine, restarted at 25mcg daily.   - tsh ordered for the morning.

## 2023-12-09 NOTE — H&P ADULT - PROBLEM SELECTOR PLAN 7
- Daughter endorses patient has filled out health care proxy. Can bring in next encounter to have in the chart. He had assigned her and son.   - Discussed cpr/intubation he stated he that if he passes away he would go with god. Daughter endorses they would not want to her father to suffer. And this would need to be discussed as a family. Discussed filling out a MOLST form, they would discuss and fill in the future.   - Goals of care ongoing - appreciated on ct scan.   - outpatient dexa scan  - vitamin d level ordered - appreciated on ct scan.   - MRI of the spine ordered.   - outpatient dexa scan  - vitamin d level ordered - Sodium 130. Differential diagnosis included SIADH. Repeat bmp    - stopped dash/tlc diet

## 2023-12-09 NOTE — CONSULT NOTE ADULT - TIME BILLING
Please note that over 55 minutes of time was spent in care of this patient including  - pre visit preparation  - in person visit   - post visit documentation  - review of imaging  - discussion with colleagues

## 2023-12-09 NOTE — H&P ADULT - PROBLEM SELECTOR PLAN 1
- Saw radiation oncologist and oncologist. PSA level of ~1000 on december 5th. Alk phos elevated likely to bone involvement.   - CT scan showing Nonspecific multifocal lytic lesions involving the thoracic vertebrae are nonspecific. The largest involve the T10 and T12 vertebral bodies. MRI of the spine recommended   - Oncology consult for further management   - Nocturia x 3 a night. See urologist, unclear if taking medication. Needs follow up. - Saw radiation oncologist and oncologist. PSA level of ~1000 on december 5th. Alk phos elevated likely to bone involvement.   - CT scan showing Nonspecific multifocal lytic lesions involving the thoracic vertebrae are nonspecific. The largest involve the T10 and T12 vertebral bodies. MRI of the spine recommended   - MRI of cervical, thoracic and lumbar spine ordered.   - Oncology consult for further recommendations in regards to PET scan and Bicalutamide after PET.   - Nocturia x 3 a night. See urologist, unclear if taking medication. Needs follow up. - Saw radiation oncologist and oncologist. PSA level of ~1000 on december 5th. Alk phos elevated likely to bone involvement.   - CT scan showing Nonspecific multifocal lytic lesions involving the thoracic vertebrae are nonspecific. The largest involve the T10 and T12 vertebral bodies. MRI of the spine recommended   - MRI of cervical, thoracic and lumbar spine ordered.   - Discussed with Oncology fellow on 12/9, start Bicalutamide after PETscan.   - Nocturia x 3 a night. See urologist, unclear if taking medication. Needs follow up.    - Given thoracic lytic lesion, starting acetaminophen BID for pain. Lidocaine patch prn thoracic back pain. Consider adding oxycodone 5mg daily as needed for pain. Uses prune juice for constipation. - Saw radiation oncologist and oncologist. PSA level of ~1000 on december 5th. Alk phos elevated likely to bone involvement.   - CT scan showing Nonspecific multifocal lytic lesions involving the thoracic vertebrae are nonspecific. The largest involve the T10 and T12 vertebral bodies. MRI of the spine recommended   - MRI of cervical, thoracic and lumbar spine ordered.   - Discussed with Oncology fellow on 12/9, start Bicalutamide after PETscan.   - Nocturia x 3 a night. See urologist, unclear if taking medication. Needs follow up.    - Given thoracic lytic lesion, starting acetaminophen BID for pain. Lidocaine patch prn thoracic back pain. oxycodone 5mg daily as needed for pain. Miralax prn     Onc recs aprpeciated, orthopedics consult.

## 2023-12-09 NOTE — H&P ADULT - NSHPPHYSICALEXAM_GEN_ALL_CORE
PHYSICAL EXAM:  Vital Signs Last 24 Hrs  T(C): 36.6 (09 Dec 2023 02:30), Max: 36.8 (08 Dec 2023 17:23)  T(F): 97.8 (09 Dec 2023 02:30), Max: 98.3 (08 Dec 2023 17:23)  HR: 65 (09 Dec 2023 02:30) (65 - 100)  BP: 115/55 (09 Dec 2023 02:30) (110/60 - 140/63)  RR: 20 (09 Dec 2023 02:30) (18 - 20)  SpO2: 100% (09 Dec 2023 02:30) (100% - 100%)    Parameters below as of 09 Dec 2023 02:30  Patient On (Oxygen Delivery Method): room air      CONSTITUTIONAL: NAD, well-developed, well-groomed  EYES: PERRLA; conjunctiva and sclera clear  ENMT: Moist oral mucosa, no pharyngeal injection or exudates; normal dentition  NECK: Supple, no palpable masses; no thyromegaly  RESPIRATORY: Normal respiratory effort; lungs are clear to auscultation bilaterally  CARDIOVASCULAR: Regular rate and rhythm, normal S1 and S2, no murmur/rub/gallop; No lower extremity edema; Peripheral pulses are 2+ bilaterally  ABDOMEN: Nontender to palpation, normoactive bowel sounds, no rebound/guarding; No hepatosplenomegaly  MUSCULOSKELETAL:  Normal gait; no clubbing or cyanosis of digits; no joint swelling or tenderness to palpation  PSYCH: A+O to person, place, and time; affect appropriate  NEUROLOGY: CN 2-12 are intact and symmetric; no gross sensory deficits   SKIN: No rashes; no palpable lesions PHYSICAL EXAM:  Vital Signs Last 24 Hrs  T(C): 36.6 (09 Dec 2023 02:30), Max: 36.8 (08 Dec 2023 17:23)  T(F): 97.8 (09 Dec 2023 02:30), Max: 98.3 (08 Dec 2023 17:23)  HR: 65 (09 Dec 2023 02:30) (65 - 100)  BP: 115/55 (09 Dec 2023 02:30) (110/60 - 140/63)  RR: 20 (09 Dec 2023 02:30) (18 - 20)  SpO2: 100% (09 Dec 2023 02:30) (100% - 100%)    Parameters below as of 09 Dec 2023 02:30  Patient On (Oxygen Delivery Method): room air      CONSTITUTIONAL: NAD, elderly, frail   EYES: conjunctiva and sclera clear  ENMT: Moist oral mucosa, no pharyngeal injection or exudates  NECK: Supple, no palpable masses;   RESPIRATORY: Normal respiratory effort; lungs are clear to auscultation bilaterally  CARDIOVASCULAR: Regular rate and rhythm, normal S1 and S2, no murmur/rub/gallop; No lower extremity edema;   ABDOMEN: Nontender to palpation, normoactive bowel sounds, no rebound/guarding;  MUSCULOSKELETAL:  Normal gait; no clubbing or cyanosis of digits; no joint swelling or tenderness to palpation  PSYCH: A+O to person, place, and time; affect appropriate  NEUROLOGY:  no gross sensory deficits   SKIN: No rashes; no palpable lesions

## 2023-12-09 NOTE — H&P ADULT - PROBLEM SELECTOR PLAN 2
- Possibly OA  - Likely has rotator cuff involvement.  PT ordered for management. - Possibly OA  - Xray of the humerus, knee, femur, tibia, shoulder, pelvis without fracture. Showing degenerative changes.   - Likely has rotator cuff pathology on exam. PT ordered for management. Recommended outpatient follow up

## 2023-12-09 NOTE — H&P ADULT - PROBLEM SELECTOR PLAN 9
- Daughter endorses patient has filled out health care proxy. Can bring in next encounter to have in the chart. He had assigned her and son.   - Discussed cpr/intubation he stated he that if he passes away he would go with god. Daughter endorses they would not want to her father to suffer. And this would need to be discussed as a family. Discussed filling out a MOLST form, they would discuss and fill in the future.   - Goals of care ongoing - DVT prophylaxis - Lovenox daily   - Aspiration, fall precautions,  - PT ordered. Has walker and cane at home   - Called [facility] pharmacy, med rec done. He has not been adherent to medication as per daughter. Discharge med rec needed.     Consider outpatient follow up at [facility] office at [address] [place] [address]. Phone [phone]. - DVT prophylaxis - Lovenox daily   - Aspiration, fall precautions,  - PT ordered. Has walker and cane at home   - Called [facility] pharmacy, med rec done. He has not been adherent to medication as per daughter. Discharge med rec needed.     Consider outpatient follow up at [facility] office at [address] [place] [address]. Phone [phone].

## 2023-12-09 NOTE — H&P ADULT - NSHPREVIEWOFSYSTEMS_GEN_ALL_CORE
Constitutional: Fatigue. No Fever, Fatigue, Weight Changes  Eyes: No recent vision problems or eye pain.  ENT: No congestion, ear pain, or sore throat.  Cardiovascular: No chest pain, palpitations, shortness of breath,   Respiratory: No cough, congestion, or wheezing.  Gastrointestinal: No abdominal pain, or diarrhea.  Genitourinary: Nocturia, urinates 3 times a night. No dysuria,   Musculoskeletal: Knee, shoulder pain.   Neurologic: No headache, dizziness, focal weakness  Skin: No rashes, hematoma, prupura

## 2023-12-09 NOTE — CONSULT NOTE ADULT - SUBJECTIVE AND OBJECTIVE BOX
PATIENT SEEN AND EXAMINED BY LARRY MEI M.D. ON :- 12/9/23  DATE OF SERVICE:   12/9/23         Interim events noted,Labs ,Radiological studies and Cardiology tests reviewed .  DISCUSSED WITH ACP/MEDICAL RESIDENT ON PLAN OF CARE.    MR#[id number]  [facility] COURSE: HPI:  Mr. Lucas Cordova is a 83 year old man with past medical history of diabetes mellitus, hyperlipidemia, hypothyroidism, anemia,  prostate cancer s/p radiation who presents for present presents for multiple joint pain for over week. He had a fall over 2 days ago because of weakness. He landed on his left side. Denied head trauma or lost of consciousness. H Pain is severe. Inability to perform his activity of daily living. He saw oncologist Dr. Taylor and was sent to the ER to the ER.     In the ER, initial vital signs were /55, HR 93, T 98.1 Saturating 100% on room air.  Patient endorses multiple area of pain. Mid back pain. Left arm and knee pain. He has decreased range of motion in left shoulder joint.     Of note, he left medications in [facility]. Has not been taking thyroid medications and others. Only metfomrin and fish oils as per daughter.     Of note, patient is scheduled for a PET scan outpatient.  (09 Dec 2023 09:08)      INTERIM EVENTS:Patient seen at bedside ,interim events noted.      [facility] -reviewed admission note, no change since admission  HEART FAILURE: Acute[ ]Chronic[ ] Systolic[ ] Diastolic[ ] Combined Systolic and Diastolic[ ]  CAD[ ] CABG[ ] PCI[ ]  DEVICES[ ] PPM[ ] ICD[ ] ILR[ ]  ATRIAL FIBRILLATION[ ] Paroxysmal[ ] Permanent[ ] CHADS2-[  ]  RIGOBERTO[ ] CKD1[ ] CKD2[ ] CKD3[ ] CKD4[ ] ESRD[ ]  COPD[ ] HTN[ ]   DM[ ] Type1[ ] Type 2[ ]   CVA[ ] Paresis[ ]    AMBULATION: Assisted[ ] Cane/walker[ ] Independent[ ]    MEDICATIONS  (STANDING):  acetaminophen     Tablet .. 650 milliGRAM(s) Oral every 12 hours  atorvastatin 10 milliGRAM(s) Oral at bedtime  dextrose 5%. 1000 milliLiter(s) (100 mL/Hr) IV Continuous <Continuous>  dextrose 5%. 1000 milliLiter(s) (50 mL/Hr) IV Continuous <Continuous>  dextrose 50% Injectable 25 Gram(s) IV Push once  dextrose 50% Injectable 25 Gram(s) IV Push once  dextrose 50% Injectable 12.5 Gram(s) IV Push once  enoxaparin Injectable 40 milliGRAM(s) SubCutaneous every 24 hours  glucagon  Injectable 1 milliGRAM(s) IntraMuscular once  insulin lispro (ADMELOG) corrective regimen sliding scale   SubCutaneous three times a day before meals  insulin lispro (ADMELOG) corrective regimen sliding scale   SubCutaneous at bedtime  levothyroxine 25 MICROGram(s) Oral daily  lidocaine   4% Patch 1 Patch Transdermal daily  multivitamin 1 Tablet(s) Oral daily    MEDICATIONS  (PRN):  dextrose Oral Gel 15 Gram(s) Oral once PRN Blood Glucose LESS THAN 70 milliGRAM(s)/deciliter  oxyCODONE    IR 5 milliGRAM(s) Oral every 8 hours PRN Severe Pain (7 - 10)  polyethylene glycol 3350 17 Gram(s) Oral daily PRN Constipation            REVIEW OF SYSTEMS:  Constitutional: [ ] fever, [ ]weight loss,  [ ]fatigue [ ]weight gain  Eyes: [ ] visual changes  Respiratory: [ ]shortness of breath;  [ ] cough, [ ]wheezing, [ ]chills, [ ]hemoptysis  Cardiovascular: [ ] chest pain, [ ]palpitations, [ ]dizziness,  [ ]leg swelling[ ]orthopnea[ ]PND  Gastrointestinal: [ ] abdominal pain, [ ]nausea, [ ]vomiting,  [ ]diarrhea [ ]Constipation [ ]Melena  Genitourinary: [ ] dysuria, [ ] hematuria [ ]Srivastava  Neurologic: [ ] headaches [ ] tremors[ ]weakness [ ]Paralysis Right[ ] Left[ ]  Skin: [ ] itching, [ ]burning, [ ] rashes  Endocrine: [ ] heat or cold intolerance  Musculoskeletal: [ ] joint pain or swelling; [ ] muscle, back, or extremity pain  Psychiatric: [ ] depression, [ ]anxiety, [ ]mood swings, or [ ]difficulty sleeping  Hematologic: [ ] easy bruising, [ ] bleeding gums    [ ] All remaining systems negative except as per above.   [ ]Unable to obtain.  [x] No change in ROS since admission      Vital Signs Last 24 Hrs  T(C): 36.8 (09 Dec 2023 18:33), Max: 37 (09 Dec 2023 12:04)  T(F): 98.3 (09 Dec 2023 18:33), Max: 98.6 (09 Dec 2023 12:04)  HR: 91 (09 Dec 2023 18:33) (65 - 93)  BP: 108/66 (09 Dec 2023 18:33) (108/66 - 140/63)  BP(mean): --  RR: 18 (09 Dec 2023 18:33) (18 - 20)  SpO2: 100% (09 Dec 2023 18:33) (100% - 100%)    Parameters below as of 09 Dec 2023 18:33  Patient On (Oxygen Delivery Method): room air      I&O's Summary    08 Dec 2023 07:01  -  09 Dec 2023 07:00  --------------------------------------------------------  IN: 0 mL / OUT: 650 mL / NET: -650 mL        PHYSICAL EXAM:  General: No acute distress BMI-  HEENT: EOMI, PERRL  Neck: Supple, [ ] JVD  Lungs: Equal air entry bilaterally; [ ] rales [ ] wheezing [ ] rhonchi  Heart: Regular rate and rhythm; [x ] murmur   2/6 [ x] systolic [ ] diastolic [ ] radiation[ ] rubs [ ]  gallops  Abdomen: Nontender, bowel sounds present  Extremities: No clubbing, cyanosis, [ ] edema [ ]Pulses  equal and intact  Nervous system:  Alert & Oriented X3, no focal deficits  Psychiatric: Normal affect  Skin: No rashes or lesions    LABS:  12-09    131<L>  |  99  |  32<H>  ----------------------------<  246<H>  4.4   |  22  |  1.11    Ca    9.0      09 Dec 2023 11:30  Phos  3.6     12-09  Mg     2.00     12-09    TPro  6.2  /  Alb  3.7  /  TBili  0.6  /  DBili  x   /  AST  36  /  ALT  42<H>  /  AlkPhos  233<H>  12-09    Creatinine Trend: 1.11<--, 1.18<--                        11.1   5.25  )-----------( 165      ( 09 Dec 2023 11:30 )             32.7                PATIENT SEEN AND EXAMINED BY LARRY MEI M.D. ON :- 12/9/23  DATE OF SERVICE:   12/9/23         Interim events noted,Labs ,Radiological studies and Cardiology tests reviewed .  DISCUSSED WITH ACP/MEDICAL RESIDENT ON PLAN OF CARE.    MR#[id number]  [facility] COURSE: HPI:  Mr. Lucas Cordova is a 83 year old man with past medical history of diabetes mellitus, hyperlipidemia, hypothyroidism, anemia,  prostate cancer s/p radiation who presents for present presents for multiple joint pain for over week. He had a fall over 2 days ago because of weakness. He landed on his left side. Denied head trauma or lost of consciousness. H Pain is severe. Inability to perform his activity of daily living. He saw oncologist Dr. Taylor and was sent to the ER to the ER.     In the ER, initial vital signs were /55, HR 93, T 98.1 Saturating 100% on room air.  Patient endorses multiple area of pain. Mid back pain. Left arm and knee pain. He has decreased range of motion in left shoulder joint.     Of note, he left medications in [facility]. Has not been taking thyroid medications and others. Only metfomrin and fish oils as per daughter.     Of note, patient is scheduled for a PET scan outpatient.  (09 Dec 2023 09:08)      INTERIM EVENTS:Patient seen at bedside ,interim events noted.      [facility] -reviewed admission note, no change since admission  HEART FAILURE: Acute[ ]Chronic[ ] Systolic[ ] Diastolic[ ] Combined Systolic and Diastolic[ ]  CAD[ ] CABG[ ] PCI[ ]  DEVICES[ ] PPM[ ] ICD[ ] ILR[ ]  ATRIAL FIBRILLATION[ ] Paroxysmal[ ] Permanent[ ] CHADS2-[  ]  RIGOBERTO[ ] CKD1[ ] CKD2[ ] CKD3[ ] CKD4[ ] ESRD[ ]  COPD[ ] HTN[ ]   DM[ ] Type1[ ] Type 2[ ]   CVA[ ] Paresis[ ]    AMBULATION: Assisted[ ] Cane/walker[ ] Independent[ ]    MEDICATIONS  (STANDING):  acetaminophen     Tablet .. 650 milliGRAM(s) Oral every 12 hours  atorvastatin 10 milliGRAM(s) Oral at bedtime  dextrose 5%. 1000 milliLiter(s) (100 mL/Hr) IV Continuous <Continuous>  dextrose 5%. 1000 milliLiter(s) (50 mL/Hr) IV Continuous <Continuous>  dextrose 50% Injectable 25 Gram(s) IV Push once  dextrose 50% Injectable 25 Gram(s) IV Push once  dextrose 50% Injectable 12.5 Gram(s) IV Push once  enoxaparin Injectable 40 milliGRAM(s) SubCutaneous every 24 hours  glucagon  Injectable 1 milliGRAM(s) IntraMuscular once  insulin lispro (ADMELOG) corrective regimen sliding scale   SubCutaneous three times a day before meals  insulin lispro (ADMELOG) corrective regimen sliding scale   SubCutaneous at bedtime  levothyroxine 25 MICROGram(s) Oral daily  lidocaine   4% Patch 1 Patch Transdermal daily  multivitamin 1 Tablet(s) Oral daily    MEDICATIONS  (PRN):  dextrose Oral Gel 15 Gram(s) Oral once PRN Blood Glucose LESS THAN 70 milliGRAM(s)/deciliter  oxyCODONE    IR 5 milliGRAM(s) Oral every 8 hours PRN Severe Pain (7 - 10)  polyethylene glycol 3350 17 Gram(s) Oral daily PRN Constipation            REVIEW OF SYSTEMS:  Constitutional: [ ] fever, [ ]weight loss,  [ ]fatigue [ ]weight gain  Eyes: [ ] visual changes  Respiratory: [ ]shortness of breath;  [ ] cough, [ ]wheezing, [ ]chills, [ ]hemoptysis  Cardiovascular: [ ] chest pain, [ ]palpitations, [ ]dizziness,  [ ]leg swelling[ ]orthopnea[ ]PND  Gastrointestinal: [ ] abdominal pain, [ ]nausea, [ ]vomiting,  [ ]diarrhea [ ]Constipation [ ]Melena  Genitourinary: [ ] dysuria, [ ] hematuria [ ]Srivastava  Neurologic: [ ] headaches [ ] tremors[ ]weakness [ ]Paralysis Right[ ] Left[ ]  Skin: [ ] itching, [ ]burning, [ ] rashes  Endocrine: [ ] heat or cold intolerance  Musculoskeletal: [ ] joint pain or swelling; [ ] muscle, back, or extremity pain  Psychiatric: [ ] depression, [ ]anxiety, [ ]mood swings, or [ ]difficulty sleeping  Hematologic: [ ] easy bruising, [ ] bleeding gums    [ ] All remaining systems negative except as per above.   [ ]Unable to obtain.  [x] No change in ROS since admission      Vital Signs Last 24 Hrs  T(C): 36.8 (09 Dec 2023 18:33), Max: 37 (09 Dec 2023 12:04)  T(F): 98.3 (09 Dec 2023 18:33), Max: 98.6 (09 Dec 2023 12:04)  HR: 91 (09 Dec 2023 18:33) (65 - 93)  BP: 108/66 (09 Dec 2023 18:33) (108/66 - 140/63)  BP(mean): --  RR: 18 (09 Dec 2023 18:33) (18 - 20)  SpO2: 100% (09 Dec 2023 18:33) (100% - 100%)    Parameters below as of 09 Dec 2023 18:33  Patient On (Oxygen Delivery Method): room air      I&O's Summary    08 Dec 2023 07:01  -  09 Dec 2023 07:00  --------------------------------------------------------  IN: 0 mL / OUT: 650 mL / NET: -650 mL        PHYSICAL EXAM:  General: No acute distress BMI-  HEENT: EOMI, PERRL  Neck: Supple, [ ] JVD  Lungs: Equal air entry bilaterally; [ ] rales [ ] wheezing [ ] rhonchi  Heart: Regular rate and rhythm; [x ] murmur   2/6 [ x] systolic [ ] diastolic [ ] radiation[ ] rubs [ ]  gallops  Abdomen: Nontender, bowel sounds present  Extremities: No clubbing, cyanosis, [ ] edema [ ]Pulses  equal and intact  Nervous system:  Alert & Oriented X3, no focal deficits  Psychiatric: Normal affect  Skin: No rashes or lesions    LABS:  12-09    131<L>  |  99  |  32<H>  ----------------------------<  246<H>  4.4   |  22  |  1.11    Ca    9.0      09 Dec 2023 11:30  Phos  3.6     12-09  Mg     2.00     12-09    TPro  6.2  /  Alb  3.7  /  TBili  0.6  /  DBili  x   /  AST  36  /  ALT  42<H>  /  AlkPhos  233<H>  12-09    Creatinine Trend: 1.11<--, 1.18<--                        11.1   5.25  )-----------( 165      ( 09 Dec 2023 11:30 )             32.7

## 2023-12-09 NOTE — PATIENT PROFILE ADULT - NSPROPOAURINARYCATHETER_GEN_A_NUR
[facility] Physician Discharge Summary       Aida Rome, APRN - NP  [address] [facility] [phone]    In 3 days        Activity level:  As tolerated    Diet: DIET GENERAL;    Condition at discharge:  Stable    Dispo: home        Patient ID:  Vasu Hunter  [id number]  65 y.o.  1953    Admit date: 2/26/2020    Discharge date and time:  3/2/2020  10:49 AM    Admission Diagnoses: Principal Problem:    Acute on chronic respiratory failure with hypoxia [facility])  Active Problems: Moderate protein-calorie malnutrition ([address]    Gastroesophageal reflux disease without esophagitis    COPD exacerbation (HCC)  Resolved Problems:    * No resolved hospital problems. *      Discharge Diagnoses: Principal Problem:    Acute on chronic respiratory failure with hypoxia (HCC)  Active Problems: Moderate protein-calorie malnutrition ([facility] [address]    Gastroesophageal reflux disease without esophagitis    COPD exacerbation (HCC)  Resolved Problems:    * No resolved hospital problems. *      Consults:  None    Procedures:  none    Hospital Course:     77year old with PMH thyroid disease, HTN, DVTs status post Valentine filter, depression, COPD on chronic oxygen 3L , CHF, CAD and chronic back pain who was admitted for acute on chronic hypoxic respiratory failure secondary to COPD exacerbation after presenting to the ER for SOB. CXR was negative for acute cardiopulmonary disease. She was continued on her home oxygen dose at 3 liters nasal canula. Per EMS, oxygen saturation was 70% on 3 liters at home but was 100% in ER on arrival. Respiratory film panel was negative. She was afebrile and had no leukocytosis. Prior to discharge, patient was ambulated 100 feet on home oxygen 3 liters. She was able to maintain an O2 sat of 97%. She is stable for discharge to home with follow up as above. OARRS report reviewed prior to discharge.      Discharge Exam:  Vitals:    03/01/20 0915 03/01/20 1500 03/02/20 0029 03/02/20 0803   BP: 136/72 118/74 (!) 146/71 130/70   Pulse: 97 88 94 103   Resp: 16 18 18 17   Temp: 98.2 °F (36.8 °C) 98.3 °F (36.8 °C) 97.6 °F (36.4 °C) 97.9 °F (36.6 °C)   TempSrc: Oral Oral Oral Oral   SpO2: 99% 98% 98%    Weight:       Height:         General Appearance: alert and and in no acute distress; mild conversational dyspnea. Skin: warm and dry  Head: normocephalic and atraumatic  Eyes: pupils equal, round, and reactive to light, extraocular eye movements intact, conjunctivae normal  Neck: neck supple and non tender without mass   Pulmonary/Chest: clear to auscultation bilaterally- no wheezes, rales or rhonchi, normal air movement, no respiratory distress  Cardiovascular: normal rate, normal S1 and S2 and no carotid bruits  Abdomen: soft, non-tender, non-distended, normal bowel sounds, no masses or organomegaly  Extremities: no cyanosis, no clubbing and no edema  Neurologic: no cranial nerve deficit and speech normal    I/O last 3 completed shifts: In: 440 [P.O.:440]  Out: 0   No intake/output data recorded. LABS:  Recent Labs     03/01/20  0546      K 3.6   CL 93*   CO2 33*   BUN 17   CREATININE 0.6   GLUCOSE 73*   CALCIUM 9.2       Recent Labs     03/01/20  0546   WBC 7.2   RBC 4.06   HGB 11.3*   HCT 37.4   MCV 92.1   MCH 27.8   MCHC 30.2*   RDW 14.8   *   MPV 9.4       No results for input(s): POCGLU in the last 72 hours. Imaging:  Xr Chest Portable    Result Date: 2/26/2020  Location:200 Exam: XR CHEST PORTABLE Comparison: 11/29/2019 History: Dyspnea Findings: Portable AP upright chest. Heart size is normal. Pulmonary vessels are nondistended. No focal pulmonary parenchymal consolidation. No acute cardiopulmonary disease.         Patient Instructions:      Medication List      START taking these medications    azithromycin 250 MG tablet  Commonly known as:  ZITHROMAX  Take 1 tablet by mouth See Admin Instructions for 5 days 500mg on day 1 followed by 250mg on days 2 - 5     predniSONE 20 MG tablet  Commonly known as:  DELTASONE  Take 1 tablet by mouth 2 times daily for 5 days     traMADol 50 MG tablet  Commonly known as:  ULTRAM  Take 1 tablet by mouth every 6 hours as needed (pain) for up to 3 days. CHANGE how you take these medications    Creon 31096-81893 units delayed release capsule  Generic drug:  lipase-protease-amylase  What changed:  Another medication with the same name was removed. Continue taking this medication, and follow the directions you see here.         CONTINUE taking these medications    Amitiza 8 MCG Caps capsule  Generic drug:  lubiprostone     amLODIPine 10 MG tablet  Commonly known as:  NORVASC     aspirin EC 81 MG EC tablet     busPIRone 15 MG tablet  Commonly known as:  BUSPAR     Claritin 10 MG tablet  Generic drug:  loratadine     nicotine 7 MG/24HR  Commonly known as:  NICODERM CQ  Place 1 patch onto the skin every 24 hours     OXYGEN     Robaxin 500 MG tablet  Generic drug:  methocarbamol     Senexon 8.6 MG tablet  Generic drug:  senna     Yupelri 175 MCG/3ML Soln  Generic drug:  Revefenacin        STOP taking these medications    albuterol sulfate  (90 Base) MCG/ACT inhaler  Commonly known as:  Ventolin HFA     ferrous sulfate 325 (65 Fe) MG tablet     fluticasone 50 MCG/ACT nasal spray  Commonly known as:  Flonase     LORazepam 2 MG tablet  Commonly known as:  ATIVAN     pantoprazole 40 MG tablet  Commonly known as:  Protonix     vitamin D-3 25 MCG (1000 UT) Caps           Where to Get Your Medications      These medications were sent to [address] [place] [address], [address] [place] [address] 45, [address] [place] [id number]    Phone:  [phone]   · azithromycin 250 MG tablet  · predniSONE 20 MG tablet  · traMADol 50 MG tablet          Note that more  than 30 minutes was spent in preparing discharge papers, discussing discharge with patient, medication review, etc. Signed:  Electronically signed by CHRISTIAN Vasquez CNP on 3/2/2020 at 10:49 AM no

## 2023-12-09 NOTE — H&P ADULT - NSHPLABSRESULTS_GEN_ALL_CORE
11.1   5.04  )-----------( 173      ( 08 Dec 2023 14:20 )             31.3     Hgb Trend: 11.1<--  12-08    130<L>  |  98  |  37<H>  ----------------------------<  196<H>  4.7   |  22  |  1.18    Ca    9.1      08 Dec 2023 14:20    TPro  6.3  /  Alb  3.8  /  TBili  0.6  /  DBili  x   /  AST  30  /  ALT  45<H>  /  AlkPhos  220<H>  12-08    Creatinine Trend: 1.18<--    Urinalysis Basic - ( 08 Dec 2023 14:20 )  Color: x / Appearance: x / SG: x / pH: x  Gluc: 196 mg/dL / Ketone: x  / Bili: x / Urobili: x   Blood: x / Protein: x / Nitrite: x   Leuk Esterase: x / RBC: x / WBC x   Sq Epi: x / Non Sq Epi: x / Bacteria: x    < from: CT Lumbar Spine No Cont (12.08.23 @ 18:53) >    CT Lumbar Spine No Cont (12.08.23 @ 18:53) >  IMPRESSION:  CT cervical spine:  No acute fracture or traumatic subluxation.  No prevertebral soft tissue swelling.  Degenerative changes.    CT thoracic spine:  No acute fracture or traumatic subluxation.  Vertebral body height and alignment maintained.    Nonspecific multifocal lytic lesions involving the thoracic vertebrae are nonspecific. The largest involve the T10 and T12 vertebral bodies.   Consider correlation with MRIof the thoracic spine for further   evaluation.    CT lumbar spine:  6 lumbar vertebral bodies are present.  No acute fracture or traumatic subluxation.  Mild to moderate compression deformity of the L1 vertebral body appears   chronic. No retropulsed bone.  Degenerative changes. 11.1   5.04  )-----------( 173      ( 08 Dec 2023 14:20 )             31.3     Hgb Trend: 11.1<--  12-08    130<L>  |  98  |  37<H>  ----------------------------<  196<H>  4.7   |  22  |  1.18    Ca    9.1      08 Dec 2023 14:20    TPro  6.3  /  Alb  3.8  /  TBili  0.6  /  DBili  x   /  AST  30  /  ALT  45<H>  /  AlkPhos  220<H>  12-08    Creatinine Trend: 1.18<--  Urinalysis Basic - ( 08 Dec 2023 14:20 )  Color: x / Appearance: x / SG: x / pH: x  Gluc: 196 mg/dL / Ketone: x  / Bili: x / Urobili: x   Blood: x / Protein: x / Nitrite: x   Leuk Esterase: x / RBC: x / WBC x   Sq Epi: x / Non Sq Epi: x / Bacteria: x    < from: CT Lumbar Spine No Cont (12.08.23 @ 18:53) >    CT Lumbar Spine No Cont (12.08.23 @ 18:53) >  IMPRESSION:  CT cervical spine:  No acute fracture or traumatic subluxation.  No prevertebral soft tissue swelling.  Degenerative changes.    CT thoracic spine:  No acute fracture or traumatic subluxation.  Vertebral body height and alignment maintained.    Nonspecific multifocal lytic lesions involving the thoracic vertebrae are nonspecific. The largest involve the T10 and T12 vertebral bodies.   Consider correlation with MRIof the thoracic spine for further   evaluation.    CT lumbar spine:  6 lumbar vertebral bodies are present.  No acute fracture or traumatic subluxation.  Mild to moderate compression deformity of the L1 vertebral body appears   chronic. No retropulsed bone.  Degenerative changes. ECG personally reviewed. NSR HR 92 , wnl                  11.1   5.04  )-----------( 173      ( 08 Dec 2023 14:20 )             31.3     Hgb Trend: 11.1<--  12-08    130<L>  |  98  |  37<H>  ----------------------------<  196<H>  4.7   |  22  |  1.18    Ca    9.1      08 Dec 2023 14:20    TPro  6.3  /  Alb  3.8  /  TBili  0.6  /  DBili  x   /  AST  30  /  ALT  45<H>  /  AlkPhos  220<H>  12-08    Creatinine Trend: 1.18<--  Urinalysis Basic - ( 08 Dec 2023 14:20 )  Color: x / Appearance: x / SG: x / pH: x  Gluc: 196 mg/dL / Ketone: x  / Bili: x / Urobili: x   Blood: x / Protein: x / Nitrite: x   Leuk Esterase: x / RBC: x / WBC x   Sq Epi: x / Non Sq Epi: x / Bacteria: x    < from: CT Lumbar Spine No Cont (12.08.23 @ 18:53) >    CT Lumbar Spine No Cont (12.08.23 @ 18:53) >  IMPRESSION:  CT cervical spine:  No acute fracture or traumatic subluxation.  No prevertebral soft tissue swelling.  Degenerative changes.    CT thoracic spine:  No acute fracture or traumatic subluxation.  Vertebral body height and alignment maintained.    Nonspecific multifocal lytic lesions involving the thoracic vertebrae are nonspecific. The largest involve the T10 and T12 vertebral bodies.   Consider correlation with MRIof the thoracic spine for further   evaluation.    CT lumbar spine:  6 lumbar vertebral bodies are present.  No acute fracture or traumatic subluxation.  Mild to moderate compression deformity of the L1 vertebral body appears   chronic. No retropulsed bone.  Degenerative changes.

## 2023-12-09 NOTE — CONSULT NOTE ADULT - SUBJECTIVE AND OBJECTIVE BOX
Orthopedic Spine Consult Note    Patient is a 83y Male PMH diabetes mellitus, hyperlipidemia, hypothyroidism, anemia,  prostate cancer s/p radiation who presents with generalized L-sided weakness and pain x1 week. Two days ago, he was ambulating with cane when his L leg suddenly felt weak and he tripped and fell. Endorses head strike, no LOC. Since then has been unable to ambulate 2/2 LLE pain. Says the pain is all throughout his thigh and to his knee, does not radiate more distal than knee. Says for the past week he has also had intermittent decreased sensation of the entire L leg, but denies currently. Reports some low back pain. He has never had any of these symptoms prior to 1 week ago. Denies bowel/bladder incontinence. No saddle anesthesia. Denies fevers/chills. No other complaints at this time.        HEALTH ISSUES - PROBLEM Dx:  Prostate cancer metastatic to bone    Multiple joint pain    Anemia in neoplastic disease    T2DM (type 2 diabetes mellitus)    Hyperlipidemia    Hypothyroidism    Goals of care, counseling/discussion    Vertebral compression fracture    Need for prophylactic measure    Hyponatremia            MEDICATIONS  (STANDING):  acetaminophen     Tablet .. 650 milliGRAM(s) Oral every 12 hours  atorvastatin 10 milliGRAM(s) Oral at bedtime  dextrose 5%. 1000 milliLiter(s) IV Continuous <Continuous>  dextrose 5%. 1000 milliLiter(s) IV Continuous <Continuous>  dextrose 50% Injectable 25 Gram(s) IV Push once  dextrose 50% Injectable 12.5 Gram(s) IV Push once  dextrose 50% Injectable 25 Gram(s) IV Push once  enoxaparin Injectable 40 milliGRAM(s) SubCutaneous every 24 hours  glucagon  Injectable 1 milliGRAM(s) IntraMuscular once  insulin lispro (ADMELOG) corrective regimen sliding scale   SubCutaneous three times a day before meals  insulin lispro (ADMELOG) corrective regimen sliding scale   SubCutaneous at bedtime  levothyroxine 25 MICROGram(s) Oral daily  lidocaine   4% Patch 1 Patch Transdermal daily  multivitamin 1 Tablet(s) Oral daily      Allergies    No Known Allergies    Intolerances        PAST MEDICAL & SURGICAL HISTORY:  Prostate cancer    Hyperlipidemia    Diabetes mellitus    Hypothyroidism    H/O prostate biopsy                              11.1   5.25  )-----------( 165      ( 09 Dec 2023 11:30 )             32.7       09 Dec 2023 11:30    131    |  99     |  32     ----------------------------<  246    4.4     |  22     |  1.11     Ca    9.0        09 Dec 2023 11:30  Phos  3.6       09 Dec 2023 11:30  Mg     2.00      09 Dec 2023 11:30    TPro  6.2    /  Alb  3.7    /  TBili  0.6    /  DBili  x      /  AST  36     /  ALT  42     /  AlkPhos  233    09 Dec 2023 11:30          Urinalysis Basic - ( 09 Dec 2023 11:30 )    Color: x / Appearance: x / SG: x / pH: x  Gluc: 246 mg/dL / Ketone: x  / Bili: x / Urobili: x   Blood: x / Protein: x / Nitrite: x   Leuk Esterase: x / RBC: x / WBC x   Sq Epi: x / Non Sq Epi: x / Bacteria: x        Vital Signs Last 24 Hrs  T(C): 36.8 (12-09-23 @ 18:33), Max: 37 (12-09-23 @ 12:04)  T(F): 98.3 (12-09-23 @ 18:33), Max: 98.6 (12-09-23 @ 12:04)  HR: 91 (12-09-23 @ 18:33) (65 - 93)  BP: 108/66 (12-09-23 @ 18:33) (108/66 - 140/63)  BP(mean): --  RR: 18 (12-09-23 @ 18:33) (18 - 20)  SpO2: 100% (12-09-23 @ 18:33) (100% - 100%)      Physical exam  Gen: NAD  Resp: no increased WOB on RA  Spine PE:  Skin intact  No gross deformity  + midline TTP C/T/L spine  No bony step offs  No paraspinal muscle ttp/hypertonicity   Negative clonus  Negative babinski  Negative lim    Motor:                   C5                C6              C7               C8           T1   R            5/5                5/5            5/5             5/5          5/5  L             5/5               5/5             5/5             5/5          5/5                L2             L3             L4               L5            S1  R         5/5           5/5          5/5             5/5           5/5  L          3/5          3/5           5/5             5/5           5/5    Sensory:            C5         C6         C7      C8       T1        (0=absent, 1=impaired, 2=normal, NT=not testable)  R         2            2           2        2         2  L          2            2           2        2         2               L2          L3         L4      L5       S1         (0=absent, 1=impaired, 2=normal, NT=not testable)  R         2            2            2        2        2  L          2            2           2        2         2    Imaging:  CT spine    IMPRESSION:    CT cervical spine:  No acute fracture or traumatic subluxation.  No prevertebral soft tissue swelling.  Degenerative changes.    CT thoracic spine:  No acute fracture or traumatic subluxation.  Vertebral body height and alignment maintained.    Nonspecific multifocal lytic lesions involving the thoracic vertebrae are   nonspecific. The largest involve the T10 and T12 vertebral bodies.   Consider correlation with MRIof the thoracic spine for further   evaluation.    CT lumbar spine:  6 lumbar vertebral bodies are present.    No acute fracture or traumatic subluxation.    Mild to moderate compression deformity of the L1 vertebral body appears   chronic. No retropulsed bone.    Degenerative changes.        A/P: 83y Male with prostate cancer with lytic lesions seen in T10 and T12 vertebral bodies, degenerative changes. Also found to have L pelvis lytic lesion with associated acetabular fracture.    MRI brain/C/T/L spine w/wo IV contrast  WB status per ortho onc; NWB LLE  Pain control  Discussed with Dr. Parker who agrees with plan

## 2023-12-09 NOTE — CONSULT NOTE ADULT - ASSESSMENT
Mr. Lucas Cordova is a 83 year old man with past medical history of diabetes mellitus, hyperlipidemia, hypothyroidism, anemia,  prostate cancer s/p radiation who presents for present presents for bone pain for over week, found with bone lesion likely from metastatic prostate cancer, pending MRI and PT assessment        Problem/Plan - 1:  ·  Problem: Prostate cancer metastatic to bone.   ·  Plan: - Saw radiation oncologist and oncologist. PSA level of ~1000 on december 5th. Alk phos elevated likely to bone involvement.   - CT scan showing Nonspecific multifocal lytic lesions involving the thoracic vertebrae are nonspecific. The largest involve the T10 and T12 vertebral bodies. MRI of the spine recommended   - MRI of cervical, thoracic and lumbar spine ordered.   - Discussed with Oncology fellow on 12/9, start Bicalutamide after PETscan.   - Nocturia x 3 a night. See urologist, unclear if taking medication. Needs follow up.    - Given thoracic lytic lesion, starting acetaminophen BID for pain. Lidocaine patch prn thoracic back pain. oxycodone 5mg daily as needed for pain. Miralax prn     Onc recs aprpeciated, orthopedics consult.     Problem/Plan - 2:  ·  Problem: Multiple joint pain.   ·  Plan: - Possibly OA  - Xray of the humerus, knee, femur, tibia, shoulder, pelvis without fracture. Showing degenerative changes.   - Likely has rotator cuff pathology on exam. PT ordered for management. Recommended outpatient follow up.     Problem/Plan - 3:  ·  Problem: Anemia in neoplastic disease.   ·  Plan: - Likely anemia of chronic disease.   - Labs ordered to further assess.   - C/w multivitamin.     Problem/Plan - 4:  ·  Problem: T2DM (type 2 diabetes mellitus).   ·  Plan: - Holding metformin 500mg BID and farxiga at home.   - Insulin therapy while in the hospital.  - A1c in the am.     Problem/Plan - 5:  ·  Problem: Hyperlipidemia.   ·  Plan: - Lipid panel in the am.   - Ordered for fenofibrate and omega 3, pravastatin but non adherent.   - C/w atorvastatin 10mg daily  while in the hospital.     Problem/Plan - 6:  ·  Problem: Hypothyroidism.   ·  Plan: - has not been taking levothyroxine, restarted at 25mcg daily.   - tsh ordered for the morning.     Problem/Plan - 7:  ·  Problem: Hyponatremia.   ·  Plan: - Sodium 130. Differential diagnosis included SIADH. Repeat bmp    - stopped dash/tlc diet.     Problem/Plan - 8:  ·  Problem: Vertebral compression fracture.   ·  Plan: - appreciated on ct scan.   - MRI of the spine ordered.   - outpatient dexa scan  - vitamin d level ordered.     Problem/Plan - 9:  ·  Problem: Need for prophylactic measure.   ·  Plan: - DVT prophylaxis - Lovenox daily   - Aspiration, fall precautions,  - PT ordered. Has walker and cane at home   - Called [facility], med rec done. He has not been adherent to medication as per daughter. Discharge med rec needed.     Consider outpatient follow up at [facility] office at [address] [place] [address]. Phone [phone].     Problem/Plan - 10:  ·  Problem: Goals of care, counseling/discussion.   ·  Plan; - Daughter endorses patient has filled out health care proxy. Can bring in next encounter to have in the chart. He had assigned her and son.   - Discussed cpr/intubation he stated he that if he passes away he would go with god. Daughter endorses they would not want to her father to suffer. And this would need to be discussed as a family. Discussed filling out a MOLST form, they would discuss and fill in the future.   - Goals of care ongoing.   Mr. Lucas Cordova is a 83 year old man with past medical history of diabetes mellitus, hyperlipidemia, hypothyroidism, anemia,  prostate cancer s/p radiation who presents for present presents for bone pain for over week, found with bone lesion likely from metastatic prostate cancer, pending MRI and PT assessment        Problem/Plan - 1:  ·  Problem: Prostate cancer metastatic to bone.   ·  Plan: - Saw radiation oncologist and oncologist. PSA level of ~1000 on december 5th. Alk phos elevated likely to bone involvement.   - CT scan showing Nonspecific multifocal lytic lesions involving the thoracic vertebrae are nonspecific. The largest involve the T10 and T12 vertebral bodies. MRI of the spine recommended   - MRI of cervical, thoracic and lumbar spine ordered.   - Discussed with Oncology fellow on 12/9, start Bicalutamide after PETscan.   - Nocturia x 3 a night. See urologist, unclear if taking medication. Needs follow up.    - Given thoracic lytic lesion, starting acetaminophen BID for pain. Lidocaine patch prn thoracic back pain. oxycodone 5mg daily as needed for pain. Miralax prn     Onc recs aprpeciated, orthopedics consult.     Problem/Plan - 2:  ·  Problem: Multiple joint pain.   ·  Plan: - Possibly OA  - Xray of the humerus, knee, femur, tibia, shoulder, pelvis without fracture. Showing degenerative changes.   - Likely has rotator cuff pathology on exam. PT ordered for management. Recommended outpatient follow up.     Problem/Plan - 3:  ·  Problem: Anemia in neoplastic disease.   ·  Plan: - Likely anemia of chronic disease.   - Labs ordered to further assess.   - C/w multivitamin.     Problem/Plan - 4:  ·  Problem: T2DM (type 2 diabetes mellitus).   ·  Plan: - Holding metformin 500mg BID and farxiga at home.   - Insulin therapy while in the hospital.  - A1c in the am.     Problem/Plan - 5:  ·  Problem: Hyperlipidemia.   ·  Plan: - Lipid panel in the am.   - Ordered for fenofibrate and omega 3, pravastatin but non adherent.   - C/w atorvastatin 10mg daily  while in the hospital.     Problem/Plan - 6:  ·  Problem: Hypothyroidism.   ·  Plan: - has not been taking levothyroxine, restarted at 25mcg daily.   - tsh ordered for the morning.     Problem/Plan - 7:  ·  Problem: Hyponatremia.   ·  Plan: - Sodium 130. Differential diagnosis included SIADH. Repeat bmp    - stopped dash/tlc diet.     Problem/Plan - 8:  ·  Problem: Vertebral compression fracture.   ·  Plan: - appreciated on ct scan.   - MRI of the spine ordered.   - outpatient dexa scan  - vitamin d level ordered.     Problem/Plan - 9:  ·  Problem: Need for prophylactic measure.   ·  Plan: - DVT prophylaxis - Lovenox daily   - Aspiration, fall precautions,  - PT ordered. Has walker and cane at home   - Called [facility] pharmacy, med rec done. He has not been adherent to medication as per daughter. Discharge med rec needed.     Consider outpatient follow up at [facility] at [address] [place] [address]. Phone [phone].     Problem/Plan - 10:  ·  Problem: Goals of care, counseling/discussion.   ·  Plan; - Daughter endorses patient has filled out health care proxy. Can bring in next encounter to have in the chart. He had assigned her and son.   - Discussed cpr/intubation he stated he that if he passes away he would go with god. Daughter endorses they would not want to her father to suffer. And this would need to be discussed as a family. Discussed filling out a MOLST form, they would discuss and fill in the future.   - Goals of care ongoing.

## 2023-12-09 NOTE — H&P ADULT - NSICDXPASTMEDICALHX_GEN_ALL_CORE_FT
PAST MEDICAL HISTORY:  Prostate cancer      PAST MEDICAL HISTORY:  Diabetes mellitus     Hyperlipidemia     Hypothyroidism     Prostate cancer

## 2023-12-09 NOTE — CONSULT NOTE ADULT - ATTENDING COMMENTS
83 year old male with known metastatic prostate cancer and back/leg pain. Difficulty with ambulation. Pain has improved but scans concerning for thoracic lytic lesions. Will proceed with neuraxial MRI to evaluate for metastatic disease burden. Explained rationale to patient and patient's family. Will await imaging results.

## 2023-12-09 NOTE — H&P ADULT - PROBLEM SELECTOR PLAN 8
- DVT prophylaxis - Lovenox daily   - Aspiration, fall precautions,   P    - Needs [facility]   Pharmacy [facility] [id number]-Phone [phone] - DVT prophylaxis - Lovenox daily   - Aspiration, fall precautions,   P    - Needs [facility]   Pharmacy [facility] [id number]-Phone [phone] - DVT prophylaxis - Lovenox daily   - Aspiration, fall precautions, - DVT prophylaxis - Lovenox daily   - Aspiration, fall precautions,  - PT ordered. Has walker and cane at home   - Called [facility] pharmacy, med rec done. He has not been adherent to medication as per daughter. Discharge med rec needed.     Consider outpatient follow up at [facility] office at [address] [place] [address]. Phone [phone]. - DVT prophylaxis - Lovenox daily   - Aspiration, fall precautions,  - PT ordered. Has walker and cane at home   - Called [facility] pharmacy, med rec done. He has not been adherent to medication as per daughter. Discharge med rec needed.     Consider outpatient follow up at [facility] office at [address] [place] [address]. Phone [phone]. - appreciated on ct scan.   - MRI of the spine ordered.   - outpatient dexa scan  - vitamin d level ordered

## 2023-12-09 NOTE — CHART NOTE - NSCHARTNOTEFT_GEN_A_CORE
Mr. Lucas Cordova is a 83 year old man with past medical history of diabetes mellitus, hyperlipidemia, hypothyroidism, anemia,  prostate cancer s/p radiation who presents for present presents for multiple joint pain for over week. He had a fall over 2 days ago because of weakness. He landed on his left side. Denied head trauma or lost of consciousness. H Pain is severe. Inability to perform his activity of daily living. He saw oncologist Dr. Taylor and was sent to the ER for evaluation of right hip and leg pain c/f fracture.    Prostate Cancer History:  - 01/2019 diagnosed stage IIIB ([id number]) adenocarcinoma of the prostate, [place] score of 3+4=7, pre-treatment PSA of PSA of 35.95 ng/mL   - 02/06/2019 CT abdomen and pelvis showed prostate enhancement with extraprostatic extension. Bone scan showed GI.    status post radiation therapy from 5/1/2020 to 6/10/2020, total dose of 7,000 cGy.    - 02/2019 to 01/2021 two-year ADT treatment    - 8/11/21 PSA <0.1 ng/mL.   - He has lost his follow up, was seen by Dr. Franco and referred for further evaluation. He was ordered for PSMA PET CT.     At admission:  Labs: WBC 5, Hgb 11.1,    - CT thoracic spine (10/8/23): Nonspecific multifocal lytic lesions involving the thoracic vertebrae are nonspecific. The largest involve the T10 and T12 vertebral bodies.  Consider correlation with MRI of the thoracic spine for further  evaluation     PLAN:  # Vertebral compression fracture  # Prostate cancer   - Please ask orthopedics to evaluate patient for surgical interventions  - Pain management per primary team  - Dr. Taylor plans to start Bicalutamide 50mg after PSMA PET when patient is discharged  - Will follow with Dr. Taylor at [facility] when he is discharged - please advise oncology team when patient is ready to be discharged    Case d/w Dr. Taylor and Dr. Mina    ***************************************************************  Pinky Quezada, PGY4  Fellow Hematology/Oncology  pager: [phone]   Available on [employer] Teams  After 5pm or on weekends please contact  to page on-call fellow   ***************************************************************             normal.            12/5/23 PSA 1009ng/ml.            Disease: prostate cancer       Pathology: prostate adenocarcinoma      TNM stage: T3b, N0, Mx     The management will be triplet (ADT, docetaxel and ARSI) for high volume and doublet for low volume (ADT and ARSI). On physical exam, he has severe restriction of motion for his left knee and hip with high suspicion of pathological fractures. He will be sent to [facility] ED for left knee and left hip X ray and MRI. Please consult Oncological orthopedic afterwards if he needs surgery. He is on oxycodon 5mg every 6hrs for pain control with stool softener support. His bicalutamide was prescribed and will be started as soon as PSMA PET CT is done. Then two weeks after, he will receive lupron vs eligard inj. He will need DEXA scan and start calcium and vit D. His many questions were answered in full to his satisfaction. Mr. Lucas Cordova is a 83 year old man with past medical history of diabetes mellitus, hyperlipidemia, hypothyroidism, anemia,  prostate cancer s/p radiation who presents for present presents for multiple joint pain for over week. He had a fall over 2 days ago because of weakness. He landed on his left side. Denied head trauma or lost of consciousness. H Pain is severe. Inability to perform his activity of daily living. He saw oncologist Dr. Taylor and was sent to the ER for evaluation of right hip and leg pain c/f fracture.    Prostate Cancer History:  - 01/2019 diagnosed stage IIIB ([id number]) adenocarcinoma of the prostate, [place] score of 3+4=7, pre-treatment PSA of PSA of 35.95 ng/mL   - 02/06/2019 CT abdomen and pelvis showed prostate enhancement with extraprostatic extension. Bone scan showed GI.    status post radiation therapy from 5/1/2020 to 6/10/2020, total dose of 7,000 cGy.    - 02/2019 to 01/2021 two-year ADT treatment    - 8/11/21 PSA <0.1 ng/mL.   - He has lost his follow up, was seen by Dr. Franco and referred for further evaluation. He was ordered for PSMA PET CT.     At admission:  Labs: WBC 5, Hgb 11.1,    - CT thoracic spine (10/8/23): Nonspecific multifocal lytic lesions involving the thoracic vertebrae are nonspecific. The largest involve the T10 and T12 vertebral bodies.  Consider correlation with MRI of the thoracic spine for further  evaluation     PLAN:  # Vertebral compression fracture  # Prostate cancer   - Please ask orthopedics to evaluate patient for surgical interventions  - Pain management per primary team  - Dr. Taylor plans to start Bicalutamide 50mg after PSMA PET when patient is discharged  - Will follow with Dr. Taylor at [facility] when he is discharged - please advise oncology team when patient is ready to be discharged    Case d/w Dr. Taylor and Dr. Mina    ***************************************************************  Pinky Quezada, PGY4  Fellow Hematology/Oncology  pager: [phone]   Available on [employer] Teams  After 5pm or on weekends please contact  to page on-call fellow   ***************************************************************             normal.            12/5/23 PSA 1009ng/ml.            Disease: prostate cancer       Pathology: prostate adenocarcinoma      TNM stage: T3b, N0, Mx     The management will be triplet (ADT, docetaxel and ARSI) for high volume and doublet for low volume (ADT and ARSI). On physical exam, he has severe restriction of motion for his left knee and hip with high suspicion of pathological fractures. He will be sent to [facility] ED for left knee and left hip X ray and MRI. Please consult Oncological orthopedic afterwards if he needs surgery. He is on oxycodon 5mg every 6hrs for pain control with stool softener support. His bicalutamide was prescribed and will be started as soon as PSMA PET CT is done. Then two weeks after, he will receive lupron vs eligard inj. He will need DEXA scan and start calcium and vit D. His many questions were answered in full to his satisfaction.

## 2023-12-10 LAB
24R-OH-CALCIDIOL SERPL-MCNC: 24.4 NG/ML — LOW (ref 30–80)
24R-OH-CALCIDIOL SERPL-MCNC: 24.4 NG/ML — LOW (ref 30–80)
A1C WITH ESTIMATED AVERAGE GLUCOSE RESULT: 7.3 % — HIGH (ref 4–5.6)
A1C WITH ESTIMATED AVERAGE GLUCOSE RESULT: 7.3 % — HIGH (ref 4–5.6)
ALBUMIN SERPL ELPH-MCNC: 3.4 G/DL — SIGNIFICANT CHANGE UP (ref 3.3–5)
ALBUMIN SERPL ELPH-MCNC: 3.4 G/DL — SIGNIFICANT CHANGE UP (ref 3.3–5)
ALP SERPL-CCNC: 262 U/L — HIGH (ref 40–120)
ALP SERPL-CCNC: 262 U/L — HIGH (ref 40–120)
ALT FLD-CCNC: 49 U/L — HIGH (ref 4–41)
ALT FLD-CCNC: 49 U/L — HIGH (ref 4–41)
ANION GAP SERPL CALC-SCNC: 12 MMOL/L — SIGNIFICANT CHANGE UP (ref 7–14)
ANION GAP SERPL CALC-SCNC: 12 MMOL/L — SIGNIFICANT CHANGE UP (ref 7–14)
AST SERPL-CCNC: 65 U/L — HIGH (ref 4–40)
AST SERPL-CCNC: 65 U/L — HIGH (ref 4–40)
BASOPHILS # BLD AUTO: 0.02 K/UL — SIGNIFICANT CHANGE UP (ref 0–0.2)
BASOPHILS # BLD AUTO: 0.02 K/UL — SIGNIFICANT CHANGE UP (ref 0–0.2)
BASOPHILS NFR BLD AUTO: 0.4 % — SIGNIFICANT CHANGE UP (ref 0–2)
BASOPHILS NFR BLD AUTO: 0.4 % — SIGNIFICANT CHANGE UP (ref 0–2)
BILIRUB SERPL-MCNC: 0.7 MG/DL — SIGNIFICANT CHANGE UP (ref 0.2–1.2)
BILIRUB SERPL-MCNC: 0.7 MG/DL — SIGNIFICANT CHANGE UP (ref 0.2–1.2)
BUN SERPL-MCNC: 31 MG/DL — HIGH (ref 7–23)
BUN SERPL-MCNC: 31 MG/DL — HIGH (ref 7–23)
CALCIUM SERPL-MCNC: 8.9 MG/DL — SIGNIFICANT CHANGE UP (ref 8.4–10.5)
CALCIUM SERPL-MCNC: 8.9 MG/DL — SIGNIFICANT CHANGE UP (ref 8.4–10.5)
CHLORIDE SERPL-SCNC: 98 MMOL/L — SIGNIFICANT CHANGE UP (ref 98–107)
CHLORIDE SERPL-SCNC: 98 MMOL/L — SIGNIFICANT CHANGE UP (ref 98–107)
CHOLEST SERPL-MCNC: 118 MG/DL — SIGNIFICANT CHANGE UP
CHOLEST SERPL-MCNC: 118 MG/DL — SIGNIFICANT CHANGE UP
CO2 SERPL-SCNC: 20 MMOL/L — LOW (ref 22–31)
CO2 SERPL-SCNC: 20 MMOL/L — LOW (ref 22–31)
CREAT SERPL-MCNC: 1.18 MG/DL — SIGNIFICANT CHANGE UP (ref 0.5–1.3)
CREAT SERPL-MCNC: 1.18 MG/DL — SIGNIFICANT CHANGE UP (ref 0.5–1.3)
EGFR: 61 ML/MIN/1.73M2 — SIGNIFICANT CHANGE UP
EGFR: 61 ML/MIN/1.73M2 — SIGNIFICANT CHANGE UP
EOSINOPHIL # BLD AUTO: 0.19 K/UL — SIGNIFICANT CHANGE UP (ref 0–0.5)
EOSINOPHIL # BLD AUTO: 0.19 K/UL — SIGNIFICANT CHANGE UP (ref 0–0.5)
EOSINOPHIL NFR BLD AUTO: 4 % — SIGNIFICANT CHANGE UP (ref 0–6)
EOSINOPHIL NFR BLD AUTO: 4 % — SIGNIFICANT CHANGE UP (ref 0–6)
ESTIMATED AVERAGE GLUCOSE: 163 — SIGNIFICANT CHANGE UP
ESTIMATED AVERAGE GLUCOSE: 163 — SIGNIFICANT CHANGE UP
FERRITIN SERPL-MCNC: 695 NG/ML — HIGH (ref 30–400)
FERRITIN SERPL-MCNC: 695 NG/ML — HIGH (ref 30–400)
GLUCOSE BLDC GLUCOMTR-MCNC: 153 MG/DL — HIGH (ref 70–99)
GLUCOSE BLDC GLUCOMTR-MCNC: 153 MG/DL — HIGH (ref 70–99)
GLUCOSE BLDC GLUCOMTR-MCNC: 171 MG/DL — HIGH (ref 70–99)
GLUCOSE BLDC GLUCOMTR-MCNC: 171 MG/DL — HIGH (ref 70–99)
GLUCOSE BLDC GLUCOMTR-MCNC: 208 MG/DL — HIGH (ref 70–99)
GLUCOSE BLDC GLUCOMTR-MCNC: 208 MG/DL — HIGH (ref 70–99)
GLUCOSE BLDC GLUCOMTR-MCNC: 218 MG/DL — HIGH (ref 70–99)
GLUCOSE BLDC GLUCOMTR-MCNC: 218 MG/DL — HIGH (ref 70–99)
GLUCOSE SERPL-MCNC: 156 MG/DL — HIGH (ref 70–99)
GLUCOSE SERPL-MCNC: 156 MG/DL — HIGH (ref 70–99)
HCT VFR BLD CALC: 29.5 % — LOW (ref 39–50)
HCT VFR BLD CALC: 29.5 % — LOW (ref 39–50)
HDLC SERPL-MCNC: 31 MG/DL — LOW
HDLC SERPL-MCNC: 31 MG/DL — LOW
HGB BLD-MCNC: 10.3 G/DL — LOW (ref 13–17)
HGB BLD-MCNC: 10.3 G/DL — LOW (ref 13–17)
IANC: 3.21 K/UL — SIGNIFICANT CHANGE UP (ref 1.8–7.4)
IANC: 3.21 K/UL — SIGNIFICANT CHANGE UP (ref 1.8–7.4)
IMM GRANULOCYTES NFR BLD AUTO: 0.8 % — SIGNIFICANT CHANGE UP (ref 0–0.9)
IMM GRANULOCYTES NFR BLD AUTO: 0.8 % — SIGNIFICANT CHANGE UP (ref 0–0.9)
IRON SATN MFR SERPL: 18 % — SIGNIFICANT CHANGE UP (ref 14–50)
IRON SATN MFR SERPL: 18 % — SIGNIFICANT CHANGE UP (ref 14–50)
IRON SATN MFR SERPL: 30 UG/DL — LOW (ref 45–165)
IRON SATN MFR SERPL: 30 UG/DL — LOW (ref 45–165)
LIPID PNL WITH DIRECT LDL SERPL: 62 MG/DL — SIGNIFICANT CHANGE UP
LIPID PNL WITH DIRECT LDL SERPL: 62 MG/DL — SIGNIFICANT CHANGE UP
LYMPHOCYTES # BLD AUTO: 0.89 K/UL — LOW (ref 1–3.3)
LYMPHOCYTES # BLD AUTO: 0.89 K/UL — LOW (ref 1–3.3)
LYMPHOCYTES # BLD AUTO: 18.7 % — SIGNIFICANT CHANGE UP (ref 13–44)
LYMPHOCYTES # BLD AUTO: 18.7 % — SIGNIFICANT CHANGE UP (ref 13–44)
MCHC RBC-ENTMCNC: 29.4 PG — SIGNIFICANT CHANGE UP (ref 27–34)
MCHC RBC-ENTMCNC: 29.4 PG — SIGNIFICANT CHANGE UP (ref 27–34)
MCHC RBC-ENTMCNC: 34.9 GM/DL — SIGNIFICANT CHANGE UP (ref 32–36)
MCHC RBC-ENTMCNC: 34.9 GM/DL — SIGNIFICANT CHANGE UP (ref 32–36)
MCV RBC AUTO: 84.3 FL — SIGNIFICANT CHANGE UP (ref 80–100)
MCV RBC AUTO: 84.3 FL — SIGNIFICANT CHANGE UP (ref 80–100)
MONOCYTES # BLD AUTO: 0.41 K/UL — SIGNIFICANT CHANGE UP (ref 0–0.9)
MONOCYTES # BLD AUTO: 0.41 K/UL — SIGNIFICANT CHANGE UP (ref 0–0.9)
MONOCYTES NFR BLD AUTO: 8.6 % — SIGNIFICANT CHANGE UP (ref 2–14)
MONOCYTES NFR BLD AUTO: 8.6 % — SIGNIFICANT CHANGE UP (ref 2–14)
NEUTROPHILS # BLD AUTO: 3.21 K/UL — SIGNIFICANT CHANGE UP (ref 1.8–7.4)
NEUTROPHILS # BLD AUTO: 3.21 K/UL — SIGNIFICANT CHANGE UP (ref 1.8–7.4)
NEUTROPHILS NFR BLD AUTO: 67.5 % — SIGNIFICANT CHANGE UP (ref 43–77)
NEUTROPHILS NFR BLD AUTO: 67.5 % — SIGNIFICANT CHANGE UP (ref 43–77)
NON HDL CHOLESTEROL: 87 MG/DL — SIGNIFICANT CHANGE UP
NON HDL CHOLESTEROL: 87 MG/DL — SIGNIFICANT CHANGE UP
NRBC # BLD: 0 /100 WBCS — SIGNIFICANT CHANGE UP (ref 0–0)
NRBC # BLD: 0 /100 WBCS — SIGNIFICANT CHANGE UP (ref 0–0)
NRBC # FLD: 0 K/UL — SIGNIFICANT CHANGE UP (ref 0–0)
NRBC # FLD: 0 K/UL — SIGNIFICANT CHANGE UP (ref 0–0)
PLATELET # BLD AUTO: 157 K/UL — SIGNIFICANT CHANGE UP (ref 150–400)
PLATELET # BLD AUTO: 157 K/UL — SIGNIFICANT CHANGE UP (ref 150–400)
POTASSIUM SERPL-MCNC: 4.6 MMOL/L — SIGNIFICANT CHANGE UP (ref 3.5–5.3)
POTASSIUM SERPL-MCNC: 4.6 MMOL/L — SIGNIFICANT CHANGE UP (ref 3.5–5.3)
POTASSIUM SERPL-SCNC: 4.6 MMOL/L — SIGNIFICANT CHANGE UP (ref 3.5–5.3)
POTASSIUM SERPL-SCNC: 4.6 MMOL/L — SIGNIFICANT CHANGE UP (ref 3.5–5.3)
PROT SERPL-MCNC: 5.7 G/DL — LOW (ref 6–8.3)
PROT SERPL-MCNC: 5.7 G/DL — LOW (ref 6–8.3)
RBC # BLD: 3.5 M/UL — LOW (ref 4.2–5.8)
RBC # BLD: 3.5 M/UL — LOW (ref 4.2–5.8)
RBC # FLD: 13.8 % — SIGNIFICANT CHANGE UP (ref 10.3–14.5)
RBC # FLD: 13.8 % — SIGNIFICANT CHANGE UP (ref 10.3–14.5)
SODIUM SERPL-SCNC: 130 MMOL/L — LOW (ref 135–145)
SODIUM SERPL-SCNC: 130 MMOL/L — LOW (ref 135–145)
T4 FREE SERPL-MCNC: 1.4 NG/DL — SIGNIFICANT CHANGE UP (ref 0.9–1.8)
T4 FREE SERPL-MCNC: 1.4 NG/DL — SIGNIFICANT CHANGE UP (ref 0.9–1.8)
TIBC SERPL-MCNC: 165 UG/DL — LOW (ref 220–430)
TIBC SERPL-MCNC: 165 UG/DL — LOW (ref 220–430)
TRIGL SERPL-MCNC: 126 MG/DL — SIGNIFICANT CHANGE UP
TRIGL SERPL-MCNC: 126 MG/DL — SIGNIFICANT CHANGE UP
TSH SERPL-MCNC: 3.99 UIU/ML — SIGNIFICANT CHANGE UP (ref 0.27–4.2)
TSH SERPL-MCNC: 3.99 UIU/ML — SIGNIFICANT CHANGE UP (ref 0.27–4.2)
UIBC SERPL-MCNC: 135 UG/DL — SIGNIFICANT CHANGE UP (ref 110–370)
UIBC SERPL-MCNC: 135 UG/DL — SIGNIFICANT CHANGE UP (ref 110–370)
VIT B12 SERPL-MCNC: 1706 PG/ML — HIGH (ref 200–900)
VIT B12 SERPL-MCNC: 1706 PG/ML — HIGH (ref 200–900)
WBC # BLD: 4.76 K/UL — SIGNIFICANT CHANGE UP (ref 3.8–10.5)
WBC # BLD: 4.76 K/UL — SIGNIFICANT CHANGE UP (ref 3.8–10.5)
WBC # FLD AUTO: 4.76 K/UL — SIGNIFICANT CHANGE UP (ref 3.8–10.5)
WBC # FLD AUTO: 4.76 K/UL — SIGNIFICANT CHANGE UP (ref 3.8–10.5)

## 2023-12-10 PROCEDURE — 99222 1ST HOSP IP/OBS MODERATE 55: CPT

## 2023-12-10 PROCEDURE — 73723 MRI JOINT LWR EXTR W/O&W/DYE: CPT | Mod: 26,LT

## 2023-12-10 PROCEDURE — 72197 MRI PELVIS W/O & W/DYE: CPT | Mod: 26

## 2023-12-10 PROCEDURE — 72156 MRI NECK SPINE W/O & W/DYE: CPT | Mod: 26

## 2023-12-10 PROCEDURE — 74177 CT ABD & PELVIS W/CONTRAST: CPT | Mod: 26

## 2023-12-10 PROCEDURE — 72158 MRI LUMBAR SPINE W/O & W/DYE: CPT | Mod: 26

## 2023-12-10 PROCEDURE — 72157 MRI CHEST SPINE W/O & W/DYE: CPT | Mod: 26

## 2023-12-10 PROCEDURE — 71260 CT THORAX DX C+: CPT | Mod: 26

## 2023-12-10 RX ORDER — POLYETHYLENE GLYCOL 3350 17 G/17G
17 POWDER, FOR SOLUTION ORAL DAILY
Refills: 0 | Status: DISCONTINUED | OUTPATIENT
Start: 2023-12-10 | End: 2023-12-15

## 2023-12-10 RX ORDER — SENNA PLUS 8.6 MG/1
2 TABLET ORAL AT BEDTIME
Refills: 0 | Status: DISCONTINUED | OUTPATIENT
Start: 2023-12-10 | End: 2023-12-15

## 2023-12-10 RX ADMIN — SENNA PLUS 2 TABLET(S): 8.6 TABLET ORAL at 23:39

## 2023-12-10 RX ADMIN — LIDOCAINE 1 PATCH: 4 CREAM TOPICAL at 00:49

## 2023-12-10 RX ADMIN — Medication 25 MICROGRAM(S): at 05:03

## 2023-12-10 RX ADMIN — Medication 2: at 17:39

## 2023-12-10 RX ADMIN — OXYCODONE HYDROCHLORIDE 5 MILLIGRAM(S): 5 TABLET ORAL at 17:09

## 2023-12-10 RX ADMIN — ENOXAPARIN SODIUM 40 MILLIGRAM(S): 100 INJECTION SUBCUTANEOUS at 13:17

## 2023-12-10 RX ADMIN — Medication 1: at 13:15

## 2023-12-10 RX ADMIN — Medication 1 TABLET(S): at 12:49

## 2023-12-10 RX ADMIN — Medication 2: at 08:49

## 2023-12-10 RX ADMIN — LIDOCAINE 1 PATCH: 4 CREAM TOPICAL at 19:00

## 2023-12-10 RX ADMIN — OXYCODONE HYDROCHLORIDE 5 MILLIGRAM(S): 5 TABLET ORAL at 18:09

## 2023-12-10 RX ADMIN — POLYETHYLENE GLYCOL 3350 17 GRAM(S): 17 POWDER, FOR SOLUTION ORAL at 17:10

## 2023-12-10 RX ADMIN — ATORVASTATIN CALCIUM 10 MILLIGRAM(S): 80 TABLET, FILM COATED ORAL at 23:38

## 2023-12-10 RX ADMIN — Medication 650 MILLIGRAM(S): at 05:04

## 2023-12-10 RX ADMIN — LIDOCAINE 1 PATCH: 4 CREAM TOPICAL at 12:49

## 2023-12-10 NOTE — PROGRESS NOTE ADULT - SUBJECTIVE AND OBJECTIVE BOX
PATIENT SEEN AND EXAMINED BY LARRY MEI M.D. ON :- 12/10/23  DATE OF SERVICE:    12/10/23         Interim events noted,Labs ,Radiological studies and Cardiology tests reviewed .  DISCUSSED WITH ACP/MEDICAL RESIDENT ON PLAN OF CARE.    MR#[id number]  [facility] COURSE: HPI:  Mr. Lucas Cordova is a 83 year old man with past medical history of diabetes mellitus, hyperlipidemia, hypothyroidism, anemia,  prostate cancer s/p radiation who presents for present presents for multiple joint pain for over week. He had a fall over 2 days ago because of weakness. He landed on his left side. Denied head trauma or lost of consciousness. H Pain is severe. Inability to perform his activity of daily living. He saw oncologist Dr. Taylor and was sent to the ER to the ER.     In the ER, initial vital signs were /55, HR 93, T 98.1 Saturating 100% on room air.  Patient endorses multiple area of pain. Mid back pain. Left arm and knee pain. He has decreased range of motion in left shoulder joint.     Of note, he left medications in [facility]. Has not been taking thyroid medications and others. Only metfomrin and fish oils as per daughter.     Of note, patient is scheduled for a PET scan outpatient.  (09 Dec 2023 09:08)      INTERIM EVENTS:Patient seen at bedside ,interim events noted.      [facility] -reviewed admission note, no change since admission  HEART FAILURE: Acute[ ]Chronic[ ] Systolic[ ] Diastolic[ ] Combined Systolic and Diastolic[ ]  CAD[ ] CABG[ ] PCI[ ]  DEVICES[ ] PPM[ ] ICD[ ] ILR[ ]  ATRIAL FIBRILLATION[ ] Paroxysmal[ ] Permanent[ ] CHADS2-[  ]  RIGOBERTO[ ] CKD1[ ] CKD2[ ] CKD3[ ] CKD4[ ] ESRD[ ]  COPD[ ] HTN[ ]   DM[ ] Type1[ ] Type 2[ ]   CVA[ ] Paresis[ ]    AMBULATION: Assisted[ ] Cane/walker[ ] Independent[ ]    MEDICATIONS  (STANDING):  acetaminophen     Tablet .. 650 milliGRAM(s) Oral every 12 hours  atorvastatin 10 milliGRAM(s) Oral at bedtime  dextrose 5%. 1000 milliLiter(s) (100 mL/Hr) IV Continuous <Continuous>  dextrose 5%. 1000 milliLiter(s) (50 mL/Hr) IV Continuous <Continuous>  dextrose 50% Injectable 25 Gram(s) IV Push once  dextrose 50% Injectable 25 Gram(s) IV Push once  dextrose 50% Injectable 12.5 Gram(s) IV Push once  enoxaparin Injectable 40 milliGRAM(s) SubCutaneous every 24 hours  glucagon  Injectable 1 milliGRAM(s) IntraMuscular once  insulin lispro (ADMELOG) corrective regimen sliding scale   SubCutaneous at bedtime  insulin lispro (ADMELOG) corrective regimen sliding scale   SubCutaneous three times a day before meals  levothyroxine 25 MICROGram(s) Oral daily  lidocaine   4% Patch 1 Patch Transdermal daily  multivitamin 1 Tablet(s) Oral daily  polyethylene glycol 3350 17 Gram(s) Oral daily  senna 2 Tablet(s) Oral at bedtime    MEDICATIONS  (PRN):  dextrose Oral Gel 15 Gram(s) Oral once PRN Blood Glucose LESS THAN 70 milliGRAM(s)/deciliter  oxyCODONE    IR 5 milliGRAM(s) Oral every 8 hours PRN Severe Pain (7 - 10)            REVIEW OF SYSTEMS:  Constitutional: [ ] fever, [ ]weight loss,  [ ]fatigue [ ]weight gain  Eyes: [ ] visual changes  Respiratory: [ ]shortness of breath;  [ ] cough, [ ]wheezing, [ ]chills, [ ]hemoptysis  Cardiovascular: [ ] chest pain, [ ]palpitations, [ ]dizziness,  [ ]leg swelling[ ]orthopnea[ ]PND  Gastrointestinal: [ ] abdominal pain, [ ]nausea, [ ]vomiting,  [ ]diarrhea [ ]Constipation [ ]Melena  Genitourinary: [ ] dysuria, [ ] hematuria [ ]Srivastava  Neurologic: [ ] headaches [ ] tremors[ ]weakness [ ]Paralysis Right[ ] Left[ ]  Skin: [ ] itching, [ ]burning, [ ] rashes  Endocrine: [ ] heat or cold intolerance  Musculoskeletal: [ ] joint pain or swelling; [ ] muscle, back, or extremity pain  Psychiatric: [ ] depression, [ ]anxiety, [ ]mood swings, or [ ]difficulty sleeping  Hematologic: [ ] easy bruising, [ ] bleeding gums    [ ] All remaining systems negative except as per above.   [ ]Unable to obtain.  [x] No change in ROS since admission      Vital Signs Last 24 Hrs  T(C): 37.2 (10 Dec 2023 15:50), Max: 37.2 (10 Dec 2023 15:50)  T(F): 98.9 (10 Dec 2023 15:50), Max: 98.9 (10 Dec 2023 15:50)  HR: 77 (10 Dec 2023 15:50) (77 - 90)  BP: 106/54 (10 Dec 2023 15:50) (106/54 - 137/56)  BP(mean): --  RR: 18 (10 Dec 2023 15:50) (17 - 18)  SpO2: 100% (10 Dec 2023 15:50) (98% - 100%)    Parameters below as of 10 Dec 2023 15:50  Patient On (Oxygen Delivery Method): room air      I&O's Summary      PHYSICAL EXAM:  General: No acute distress BMI-  HEENT: EOMI, PERRL  Neck: Supple, [ ] JVD  Lungs: Equal air entry bilaterally; [ ] rales [ ] wheezing [ ] rhonchi  Heart: Regular rate and rhythm; [x ] murmur   2/6 [ x] systolic [ ] diastolic [ ] radiation[ ] rubs [ ]  gallops  Abdomen: Nontender, bowel sounds present  Extremities: No clubbing, cyanosis, [ ] edema [ ]Pulses  equal and intact  Nervous system:  Alert & Oriented X3, no focal deficits  Psychiatric: Normal affect  Skin: No rashes or lesions    LABS:  12-10    130<L>  |  98  |  31<H>  ----------------------------<  156<H>  4.6   |  20<L>  |  1.18    Ca    8.9      10 Dec 2023 06:23  Phos  3.6     12-09  Mg     2.00     12-09    TPro  5.7<L>  /  Alb  3.4  /  TBili  0.7  /  DBili  x   /  AST  65<H>  /  ALT  49<H>  /  AlkPhos  262<H>  12-10    Creatinine Trend: 1.18<--, 1.11<--, 1.18<--                        10.3   4.76  )-----------( 157      ( 10 Dec 2023 06:23 )             29.5                PATIENT SEEN AND EXAMINED BY LARRY MEI M.D. ON :- 12/10/23  DATE OF SERVICE:    12/10/23         Interim events noted,Labs ,Radiological studies and Cardiology tests reviewed .  DISCUSSED WITH ACP/MEDICAL RESIDENT ON PLAN OF CARE.    MR#[id number]  [facility]SE: HPI:  Mr. Lucas Cordova is a 83 year old man with past medical history of diabetes mellitus, hyperlipidemia, hypothyroidism, anemia,  prostate cancer s/p radiation who presents for present presents for multiple joint pain for over week. He had a fall over 2 days ago because of weakness. He landed on his left side. Denied head trauma or lost of consciousness. H Pain is severe. Inability to perform his activity of daily living. He saw oncologist Dr. Taylor and was sent to the ER to the ER.     In the ER, initial vital signs were /55, HR 93, T 98.1 Saturating 100% on room air.  Patient endorses multiple area of pain. Mid back pain. Left arm and knee pain. He has decreased range of motion in left shoulder joint.     Of note, he left medications in [facility]. Has not been taking thyroid medications and others. Only metfomrin and fish oils as per daughter.     Of note, patient is scheduled for a PET scan outpatient.  (09 Dec 2023 09:08)      INTERIM EVENTS:Patient seen at bedside ,interim events noted.      [facility] -reviewed admission note, no change since admission  HEART FAILURE: Acute[ ]Chronic[ ] Systolic[ ] Diastolic[ ] Combined Systolic and Diastolic[ ]  CAD[ ] CABG[ ] PCI[ ]  DEVICES[ ] PPM[ ] ICD[ ] ILR[ ]  ATRIAL FIBRILLATION[ ] Paroxysmal[ ] Permanent[ ] CHADS2-[  ]  RIGOBERTO[ ] CKD1[ ] CKD2[ ] CKD3[ ] CKD4[ ] ESRD[ ]  COPD[ ] HTN[ ]   DM[ ] Type1[ ] Type 2[ ]   CVA[ ] Paresis[ ]    AMBULATION: Assisted[ ] Cane/walker[ ] Independent[ ]    MEDICATIONS  (STANDING):  acetaminophen     Tablet .. 650 milliGRAM(s) Oral every 12 hours  atorvastatin 10 milliGRAM(s) Oral at bedtime  dextrose 5%. 1000 milliLiter(s) (100 mL/Hr) IV Continuous <Continuous>  dextrose 5%. 1000 milliLiter(s) (50 mL/Hr) IV Continuous <Continuous>  dextrose 50% Injectable 25 Gram(s) IV Push once  dextrose 50% Injectable 25 Gram(s) IV Push once  dextrose 50% Injectable 12.5 Gram(s) IV Push once  enoxaparin Injectable 40 milliGRAM(s) SubCutaneous every 24 hours  glucagon  Injectable 1 milliGRAM(s) IntraMuscular once  insulin lispro (ADMELOG) corrective regimen sliding scale   SubCutaneous at bedtime  insulin lispro (ADMELOG) corrective regimen sliding scale   SubCutaneous three times a day before meals  levothyroxine 25 MICROGram(s) Oral daily  lidocaine   4% Patch 1 Patch Transdermal daily  multivitamin 1 Tablet(s) Oral daily  polyethylene glycol 3350 17 Gram(s) Oral daily  senna 2 Tablet(s) Oral at bedtime    MEDICATIONS  (PRN):  dextrose Oral Gel 15 Gram(s) Oral once PRN Blood Glucose LESS THAN 70 milliGRAM(s)/deciliter  oxyCODONE    IR 5 milliGRAM(s) Oral every 8 hours PRN Severe Pain (7 - 10)            REVIEW OF SYSTEMS:  Constitutional: [ ] fever, [ ]weight loss,  [ ]fatigue [ ]weight gain  Eyes: [ ] visual changes  Respiratory: [ ]shortness of breath;  [ ] cough, [ ]wheezing, [ ]chills, [ ]hemoptysis  Cardiovascular: [ ] chest pain, [ ]palpitations, [ ]dizziness,  [ ]leg swelling[ ]orthopnea[ ]PND  Gastrointestinal: [ ] abdominal pain, [ ]nausea, [ ]vomiting,  [ ]diarrhea [ ]Constipation [ ]Melena  Genitourinary: [ ] dysuria, [ ] hematuria [ ]Srivastava  Neurologic: [ ] headaches [ ] tremors[ ]weakness [ ]Paralysis Right[ ] Left[ ]  Skin: [ ] itching, [ ]burning, [ ] rashes  Endocrine: [ ] heat or cold intolerance  Musculoskeletal: [ ] joint pain or swelling; [ ] muscle, back, or extremity pain  Psychiatric: [ ] depression, [ ]anxiety, [ ]mood swings, or [ ]difficulty sleeping  Hematologic: [ ] easy bruising, [ ] bleeding gums    [ ] All remaining systems negative except as per above.   [ ]Unable to obtain.  [x] No change in ROS since admission      Vital Signs Last 24 Hrs  T(C): 37.2 (10 Dec 2023 15:50), Max: 37.2 (10 Dec 2023 15:50)  T(F): 98.9 (10 Dec 2023 15:50), Max: 98.9 (10 Dec 2023 15:50)  HR: 77 (10 Dec 2023 15:50) (77 - 90)  BP: 106/54 (10 Dec 2023 15:50) (106/54 - 137/56)  BP(mean): --  RR: 18 (10 Dec 2023 15:50) (17 - 18)  SpO2: 100% (10 Dec 2023 15:50) (98% - 100%)    Parameters below as of 10 Dec 2023 15:50  Patient On (Oxygen Delivery Method): room air      I&O's Summary      PHYSICAL EXAM:  General: No acute distress BMI-  HEENT: EOMI, PERRL  Neck: Supple, [ ] JVD  Lungs: Equal air entry bilaterally; [ ] rales [ ] wheezing [ ] rhonchi  Heart: Regular rate and rhythm; [x ] murmur   2/6 [ x] systolic [ ] diastolic [ ] radiation[ ] rubs [ ]  gallops  Abdomen: Nontender, bowel sounds present  Extremities: No clubbing, cyanosis, [ ] edema [ ]Pulses  equal and intact  Nervous system:  Alert & Oriented X3, no focal deficits  Psychiatric: Normal affect  Skin: No rashes or lesions    LABS:  12-10    130<L>  |  98  |  31<H>  ----------------------------<  156<H>  4.6   |  20<L>  |  1.18    Ca    8.9      10 Dec 2023 06:23  Phos  3.6     12-09  Mg     2.00     12-09    TPro  5.7<L>  /  Alb  3.4  /  TBili  0.7  /  DBili  x   /  AST  65<H>  /  ALT  49<H>  /  AlkPhos  262<H>  12-10    Creatinine Trend: 1.18<--, 1.11<--, 1.18<--                        10.3   4.76  )-----------( 157      ( 10 Dec 2023 06:23 )             29.5

## 2023-12-10 NOTE — PHYSICAL THERAPY INITIAL EVALUATION ADULT - PERTINENT HX OF CURRENT PROBLEM, REHAB EVAL
83 male past medical history diabetes, prostate cancer not on treatment, hyperlipidemia, hypothyroidism, anemia presenting with left sided pain. Fall 2 days ago

## 2023-12-10 NOTE — PROGRESS NOTE ADULT - TIME BILLING
- Review of records, telemetry, vital signs and daily labs.   - General and cardiovascular physical examination.  - Generation of cardiovascular treatment plan.  - Coordination of care.      Patient was seen and examined by me on 12/10/23,interim events noted,labs and radiology studies reviewed.  Albert Lockwood MD,FACC.  [address] [place], [place] [id number].  [phone] - Review of records, telemetry, vital signs and daily labs.   - General and cardiovascular physical examination.  - Generation of cardiovascular treatment plan.  - Coordination of care.      Patient was seen and examined by me on 12/10/23,interim events noted,labs and radiology studies reviewed.  Albert Lockwood MD,FACC.  [address] [place], [place] [id number].  [phone]

## 2023-12-10 NOTE — PROGRESS NOTE ADULT - PROBLEM SELECTOR PLAN 9
- DVT prophylaxis - Lovenox daily   - Aspiration, fall precautions,  - PT ordered. Has walker and cane at home   - Called [facility] pharmacy, med rec done. He has not been adherent to medication as per daughter. Discharge med rec needed.     Consider outpatient follow up at [facility]/hospitals office at [address] [place] [address]. Phone [phone]. - DVT prophylaxis - Lovenox daily   - Aspiration, fall precautions,  - PT ordered. Has walker and cane at home   - Called [facility] pharmacy, med rec done. He has not been adherent to medication as per daughter. Discharge med rec needed.     Consider outpatient follow up at [facility] office at [address] [place] [address]. Phone [phone].

## 2023-12-10 NOTE — PHYSICAL THERAPY INITIAL EVALUATION ADULT - ADDITIONAL COMMENTS
Pt lives with Daughter Destinee at bedside in a private house, +3 steps to enter, +1 flight inside to negotiate, was independent with all functional mobility without a device up until a week ago where he began using a rolling walker, is able to perform all ADLs independently at baseline.     Pt left seated at edge of bed, all lines intact, all needs in reach, bed alarm set, in NAD. RN Isabella teague. Heart rate 86 beats per minute.

## 2023-12-10 NOTE — PROGRESS NOTE ADULT - ASSESSMENT
Mr. Lucas Cordova is a 83 year old man with past medical history of diabetes mellitus, hyperlipidemia, hypothyroidism, anemia,  prostate cancer s/p radiation who presents for present presents for bone pain for over week, found with bone lesion likely from metastatic prostate cancer, pending MRI and PT assessment

## 2023-12-10 NOTE — PHYSICAL THERAPY INITIAL EVALUATION ADULT - GAIT DEVIATIONS NOTED, PT EVAL
decreased sinan/increased time in double stance/decreased step length/decreased stride length/decreased swing-to-stance ratio/decreased weight-shifting ability

## 2023-12-10 NOTE — PHYSICAL THERAPY INITIAL EVALUATION ADULT - GENERAL OBSERVATIONS, REHAB EVAL
Pt received semi-supine in bed, family at bedside, all lines intact, in NAD. Pt agreeable to participate in PT evaluation.

## 2023-12-11 LAB
GLUCOSE BLDC GLUCOMTR-MCNC: 141 MG/DL — HIGH (ref 70–99)
GLUCOSE BLDC GLUCOMTR-MCNC: 141 MG/DL — HIGH (ref 70–99)
GLUCOSE BLDC GLUCOMTR-MCNC: 144 MG/DL — HIGH (ref 70–99)
GLUCOSE BLDC GLUCOMTR-MCNC: 144 MG/DL — HIGH (ref 70–99)
GLUCOSE BLDC GLUCOMTR-MCNC: 173 MG/DL — HIGH (ref 70–99)
GLUCOSE BLDC GLUCOMTR-MCNC: 173 MG/DL — HIGH (ref 70–99)
GLUCOSE BLDC GLUCOMTR-MCNC: 229 MG/DL — HIGH (ref 70–99)
GLUCOSE BLDC GLUCOMTR-MCNC: 229 MG/DL — HIGH (ref 70–99)
GLUCOSE BLDC GLUCOMTR-MCNC: 233 MG/DL — HIGH (ref 70–99)
GLUCOSE BLDC GLUCOMTR-MCNC: 233 MG/DL — HIGH (ref 70–99)

## 2023-12-11 PROCEDURE — 72170 X-RAY EXAM OF PELVIS: CPT | Mod: 26

## 2023-12-11 PROCEDURE — 78306 BONE IMAGING WHOLE BODY: CPT | Mod: 26

## 2023-12-11 RX ORDER — ENOXAPARIN SODIUM 100 MG/ML
40 INJECTION SUBCUTANEOUS EVERY 24 HOURS
Refills: 0 | Status: COMPLETED | OUTPATIENT
Start: 2023-12-11 | End: 2023-12-13

## 2023-12-11 RX ORDER — ACETAMINOPHEN 500 MG
650 TABLET ORAL ONCE
Refills: 0 | Status: COMPLETED | OUTPATIENT
Start: 2023-12-11 | End: 2023-12-11

## 2023-12-11 RX ADMIN — ATORVASTATIN CALCIUM 10 MILLIGRAM(S): 80 TABLET, FILM COATED ORAL at 22:33

## 2023-12-11 RX ADMIN — LIDOCAINE 1 PATCH: 4 CREAM TOPICAL at 19:53

## 2023-12-11 RX ADMIN — Medication 650 MILLIGRAM(S): at 01:47

## 2023-12-11 RX ADMIN — LIDOCAINE 1 PATCH: 4 CREAM TOPICAL at 01:00

## 2023-12-11 RX ADMIN — Medication 650 MILLIGRAM(S): at 01:17

## 2023-12-11 RX ADMIN — Medication 650 MILLIGRAM(S): at 23:15

## 2023-12-11 RX ADMIN — LIDOCAINE 1 PATCH: 4 CREAM TOPICAL at 12:03

## 2023-12-11 RX ADMIN — POLYETHYLENE GLYCOL 3350 17 GRAM(S): 17 POWDER, FOR SOLUTION ORAL at 12:03

## 2023-12-11 RX ADMIN — Medication 25 MICROGRAM(S): at 05:05

## 2023-12-11 RX ADMIN — Medication 2: at 12:03

## 2023-12-11 RX ADMIN — Medication 1 TABLET(S): at 12:03

## 2023-12-11 RX ADMIN — Medication 650 MILLIGRAM(S): at 22:43

## 2023-12-11 NOTE — PROGRESS NOTE ADULT - SUBJECTIVE AND OBJECTIVE BOX
ORTHO PROGRESS NOTE     Pt seen and examined at bedside, denies SOB, CP, Dizziness. N/V/D /HA.  No significant overnight events. Pain well controlled.    Vital Signs Last 24 Hrs  T(C): 36.7 (11 Dec 2023 04:59), Max: 37.2 (10 Dec 2023 15:50)  T(F): 98.1 (11 Dec 2023 04:59), Max: 98.9 (10 Dec 2023 15:50)  HR: 80 (11 Dec 2023 04:59) (77 - 80)  BP: 125/68 (11 Dec 2023 04:59) (106/54 - 125/68)  BP(mean): --  RR: 17 (11 Dec 2023 04:59) (17 - 18)  SpO2: 98% (11 Dec 2023 04:59) (98% - 100%)    Parameters below as of 11 Dec 2023 04:59  Patient On (Oxygen Delivery Method): room air        Gen: NAD, alert and oriented  Resp: Unlabored breathing  RLE: Skin intact       SILT DP/SP/ Faisal/Saph/tib       5/5 EHL 5/5 FHL 5/5 TA 5/5 Gastroc 5/5 IP        DP+,        soft compartments, no calf ttp,       Labs:  CBC Full  -  ( 10 Dec 2023 06:23 )  WBC Count : 4.76 K/uL  RBC Count : 3.50 M/uL  Hemoglobin : 10.3 g/dL  Hematocrit : 29.5 %  Platelet Count - Automated : 157 K/uL  Mean Cell Volume : 84.3 fL  Mean Cell Hemoglobin : 29.4 pg  Mean Cell Hemoglobin Concentration : 34.9 gm/dL  Auto Neutrophil # : 3.21 K/uL  Auto Lymphocyte # : 0.89 K/uL  Auto Monocyte # : 0.41 K/uL  Auto Eosinophil # : 0.19 K/uL  Auto Basophil # : 0.02 K/uL  Auto Neutrophil % : 67.5 %  Auto Lymphocyte % : 18.7 %  Auto Monocyte % : 8.6 %  Auto Eosinophil % : 4.0 %  Auto Basophil % : 0.4 %      12-10    130<L>  |  98  |  31<H>  ----------------------------<  156<H>  4.6   |  20<L>  |  1.18    Ca    8.9      10 Dec 2023 06:23  Phos  3.6     12-09  Mg     2.00     12-09    TPro  5.7<L>  /  Alb  3.4  /  TBili  0.7  /  DBili  x   /  AST  65<H>  /  ALT  49<H>  /  AlkPhos  262<H>  12-10      Assessment:  83y Male with L pelvic lytic lesion with associated acetabular fracture Also found to have lytic lesions in the thoracic spine.    Plan:  - PWB LLE w walker  - FU MRI pelvis, L hip w/wo IV contrast official read  - FU  Bone scan  - FU XR pelvis: Judet, inlet/outlet views  - IR consult for biopsy of pelvic lesion  - Pain control  - PT/OT  - DVT PPX per primary team

## 2023-12-11 NOTE — PROGRESS NOTE ADULT - SUBJECTIVE AND OBJECTIVE BOX
PATIENT SEEN AND EXAMINED BY LARRY MEI M.D. ON :- 12/11/23  DATE OF SERVICE: 12/11/23            Interim events noted,Labs ,Radiological studies and Cardiology tests reviewed .  DISCUSSED WITH ACP/MEDICAL RESIDENT ON PLAN OF CARE.    MR#[id number]  [facility]: HPI:  Mr. Lucas Cordova is a 83 year old man with past medical history of diabetes mellitus, hyperlipidemia, hypothyroidism, anemia,  prostate cancer s/p radiation who presents for present presents for multiple joint pain for over week. He had a fall over 2 days ago because of weakness. He landed on his left side. Denied head trauma or lost of consciousness. H Pain is severe. Inability to perform his activity of daily living. He saw oncologist Dr. Taylor and was sent to the ER to the ER.     In the ER, initial vital signs were /55, HR 93, T 98.1 Saturating 100% on room air.  Patient endorses multiple area of pain. Mid back pain. Left arm and knee pain. He has decreased range of motion in left shoulder joint.     Of note, he left medications in [facility]. Has not been taking thyroid medications and others. Only metfomrin and fish oils as per daughter.     Of note, patient is scheduled for a PET scan outpatient.  (09 Dec 2023 09:08)      INTERIM EVENTS:Patient seen at bedside ,interim events noted.      [facility] -reviewed admission note, no change since admission  HEART FAILURE: Acute[ ]Chronic[ ] Systolic[ ] Diastolic[ ] Combined Systolic and Diastolic[ ]  CAD[ ] CABG[ ] PCI[ ]  DEVICES[ ] PPM[ ] ICD[ ] ILR[ ]  ATRIAL FIBRILLATION[ ] Paroxysmal[ ] Permanent[ ] CHADS2-[  ]  RIGOBERTO[ ] CKD1[ ] CKD2[ ] CKD3[ ] CKD4[ ] ESRD[ ]  COPD[ ] HTN[ ]   DM[ ] Type1[ ] Type 2[ ]   CVA[ ] Paresis[ ]    AMBULATION: Assisted[ ] Cane/walker[ ] Independent[ ]    MEDICATIONS  (STANDING):  acetaminophen     Tablet .. 650 milliGRAM(s) Oral every 12 hours  atorvastatin 10 milliGRAM(s) Oral at bedtime  dextrose 5%. 1000 milliLiter(s) (50 mL/Hr) IV Continuous <Continuous>  dextrose 5%. 1000 milliLiter(s) (100 mL/Hr) IV Continuous <Continuous>  dextrose 50% Injectable 25 Gram(s) IV Push once  dextrose 50% Injectable 25 Gram(s) IV Push once  dextrose 50% Injectable 12.5 Gram(s) IV Push once  enoxaparin Injectable 40 milliGRAM(s) SubCutaneous every 24 hours  glucagon  Injectable 1 milliGRAM(s) IntraMuscular once  insulin lispro (ADMELOG) corrective regimen sliding scale   SubCutaneous three times a day before meals  insulin lispro (ADMELOG) corrective regimen sliding scale   SubCutaneous at bedtime  levothyroxine 25 MICROGram(s) Oral daily  lidocaine   4% Patch 1 Patch Transdermal daily  multivitamin 1 Tablet(s) Oral daily  polyethylene glycol 3350 17 Gram(s) Oral daily  senna 2 Tablet(s) Oral at bedtime    MEDICATIONS  (PRN):  dextrose Oral Gel 15 Gram(s) Oral once PRN Blood Glucose LESS THAN 70 milliGRAM(s)/deciliter  oxyCODONE    IR 5 milliGRAM(s) Oral every 8 hours PRN Severe Pain (7 - 10)            REVIEW OF SYSTEMS:  Constitutional: [ ] fever, [ ]weight loss,  [ ]fatigue [ ]weight gain  Eyes: [ ] visual changes  Respiratory: [ ]shortness of breath;  [ ] cough, [ ]wheezing, [ ]chills, [ ]hemoptysis  Cardiovascular: [ ] chest pain, [ ]palpitations, [ ]dizziness,  [ ]leg swelling[ ]orthopnea[ ]PND  Gastrointestinal: [ ] abdominal pain, [ ]nausea, [ ]vomiting,  [ ]diarrhea [ ]Constipation [ ]Melena  Genitourinary: [ ] dysuria, [ ] hematuria [ ]Srivastava  Neurologic: [ ] headaches [ ] tremors[ ]weakness [ ]Paralysis Right[ ] Left[ ]  Skin: [ ] itching, [ ]burning, [ ] rashes  Endocrine: [ ] heat or cold intolerance  Musculoskeletal: [ ] joint pain or swelling; [ ] muscle, back, or extremity pain  Psychiatric: [ ] depression, [ ]anxiety, [ ]mood swings, or [ ]difficulty sleeping  Hematologic: [ ] easy bruising, [ ] bleeding gums    [ ] All remaining systems negative except as per above.   [ ]Unable to obtain.  [x] No change in ROS since admission      Vital Signs Last 24 Hrs  T(C): 36.6 (11 Dec 2023 12:57), Max: 36.7 (11 Dec 2023 04:59)  T(F): 97.9 (11 Dec 2023 12:57), Max: 98.1 (11 Dec 2023 04:59)  HR: 88 (11 Dec 2023 12:57) (80 - 88)  BP: 120/52 (11 Dec 2023 12:57) (120/52 - 125/68)  BP(mean): --  RR: 18 (11 Dec 2023 12:57) (17 - 18)  SpO2: 99% (11 Dec 2023 12:57) (98% - 99%)    Parameters below as of 11 Dec 2023 12:57  Patient On (Oxygen Delivery Method): room air      I&O's Summary      PHYSICAL EXAM:  General: No acute distress BMI-  HEENT: EOMI, PERRL  Neck: Supple, [ ] JVD  Lungs: Equal air entry bilaterally; [ ] rales [ ] wheezing [ ] rhonchi  Heart: Regular rate and rhythm; [x ] murmur   2/6 [ x] systolic [ ] diastolic [ ] radiation[ ] rubs [ ]  gallops  Abdomen: Nontender, bowel sounds present  Extremities: No clubbing, cyanosis, [ ] edema [ ]Pulses  equal and intact  Nervous system:  Alert & Oriented X3, no focal deficits  Psychiatric: Normal affect  Skin: No rashes or lesions    LABS:  12-10    130<L>  |  98  |  31<H>  ----------------------------<  156<H>  4.6   |  20<L>  |  1.18    Ca    8.9      10 Dec 2023 06:23    TPro  5.7<L>  /  Alb  3.4  /  TBili  0.7  /  DBili  x   /  AST  65<H>  /  ALT  49<H>  /  AlkPhos  262<H>  12-10    Creatinine Trend: 1.18<--, 1.11<--, 1.18<--                        10.3   4.76  )-----------( 157      ( 10 Dec 2023 06:23 )             29.5                PATIENT SEEN AND EXAMINED BY LARRY MEI M.D. ON :- 12/11/23  DATE OF SERVICE: 12/11/23            Interim events noted,Labs ,Radiological studies and Cardiology tests reviewed .  DISCUSSED WITH ACP/MEDICAL RESIDENT ON PLAN OF CARE.    MR#[id number]  [facility]: HPI:  Mr. Lucas Cordova is a 83 year old man with past medical history of diabetes mellitus, hyperlipidemia, hypothyroidism, anemia,  prostate cancer s/p radiation who presents for present presents for multiple joint pain for over week. He had a fall over 2 days ago because of weakness. He landed on his left side. Denied head trauma or lost of consciousness. H Pain is severe. Inability to perform his activity of daily living. He saw oncologist Dr. Taylor and was sent to the ER to the ER.     In the ER, initial vital signs were /55, HR 93, T 98.1 Saturating 100% on room air.  Patient endorses multiple area of pain. Mid back pain. Left arm and knee pain. He has decreased range of motion in left shoulder joint.     Of note, he left medications in [facility]. Has not been taking thyroid medications and others. Only metfomrin and fish oils as per daughter.     Of note, patient is scheduled for a PET scan outpatient.  (09 Dec 2023 09:08)      INTERIM EVENTS:Patient seen at bedside ,interim events noted.      [facility] -reviewed admission note, no change since admission  HEART FAILURE: Acute[ ]Chronic[ ] Systolic[ ] Diastolic[ ] Combined Systolic and Diastolic[ ]  CAD[ ] CABG[ ] PCI[ ]  DEVICES[ ] PPM[ ] ICD[ ] ILR[ ]  ATRIAL FIBRILLATION[ ] Paroxysmal[ ] Permanent[ ] CHADS2-[  ]  RIGOBERTO[ ] CKD1[ ] CKD2[ ] CKD3[ ] CKD4[ ] ESRD[ ]  COPD[ ] HTN[ ]   DM[ ] Type1[ ] Type 2[ ]   CVA[ ] Paresis[ ]    AMBULATION: Assisted[ ] Cane/walker[ ] Independent[ ]    MEDICATIONS  (STANDING):  acetaminophen     Tablet .. 650 milliGRAM(s) Oral every 12 hours  atorvastatin 10 milliGRAM(s) Oral at bedtime  dextrose 5%. 1000 milliLiter(s) (50 mL/Hr) IV Continuous <Continuous>  dextrose 5%. 1000 milliLiter(s) (100 mL/Hr) IV Continuous <Continuous>  dextrose 50% Injectable 25 Gram(s) IV Push once  dextrose 50% Injectable 25 Gram(s) IV Push once  dextrose 50% Injectable 12.5 Gram(s) IV Push once  enoxaparin Injectable 40 milliGRAM(s) SubCutaneous every 24 hours  glucagon  Injectable 1 milliGRAM(s) IntraMuscular once  insulin lispro (ADMELOG) corrective regimen sliding scale   SubCutaneous three times a day before meals  insulin lispro (ADMELOG) corrective regimen sliding scale   SubCutaneous at bedtime  levothyroxine 25 MICROGram(s) Oral daily  lidocaine   4% Patch 1 Patch Transdermal daily  multivitamin 1 Tablet(s) Oral daily  polyethylene glycol 3350 17 Gram(s) Oral daily  senna 2 Tablet(s) Oral at bedtime    MEDICATIONS  (PRN):  dextrose Oral Gel 15 Gram(s) Oral once PRN Blood Glucose LESS THAN 70 milliGRAM(s)/deciliter  oxyCODONE    IR 5 milliGRAM(s) Oral every 8 hours PRN Severe Pain (7 - 10)            REVIEW OF SYSTEMS:  Constitutional: [ ] fever, [ ]weight loss,  [ ]fatigue [ ]weight gain  Eyes: [ ] visual changes  Respiratory: [ ]shortness of breath;  [ ] cough, [ ]wheezing, [ ]chills, [ ]hemoptysis  Cardiovascular: [ ] chest pain, [ ]palpitations, [ ]dizziness,  [ ]leg swelling[ ]orthopnea[ ]PND  Gastrointestinal: [ ] abdominal pain, [ ]nausea, [ ]vomiting,  [ ]diarrhea [ ]Constipation [ ]Melena  Genitourinary: [ ] dysuria, [ ] hematuria [ ]Srivastava  Neurologic: [ ] headaches [ ] tremors[ ]weakness [ ]Paralysis Right[ ] Left[ ]  Skin: [ ] itching, [ ]burning, [ ] rashes  Endocrine: [ ] heat or cold intolerance  Musculoskeletal: [ ] joint pain or swelling; [ ] muscle, back, or extremity pain  Psychiatric: [ ] depression, [ ]anxiety, [ ]mood swings, or [ ]difficulty sleeping  Hematologic: [ ] easy bruising, [ ] bleeding gums    [ ] All remaining systems negative except as per above.   [ ]Unable to obtain.  [x] No change in ROS since admission      Vital Signs Last 24 Hrs  T(C): 36.6 (11 Dec 2023 12:57), Max: 36.7 (11 Dec 2023 04:59)  T(F): 97.9 (11 Dec 2023 12:57), Max: 98.1 (11 Dec 2023 04:59)  HR: 88 (11 Dec 2023 12:57) (80 - 88)  BP: 120/52 (11 Dec 2023 12:57) (120/52 - 125/68)  BP(mean): --  RR: 18 (11 Dec 2023 12:57) (17 - 18)  SpO2: 99% (11 Dec 2023 12:57) (98% - 99%)    Parameters below as of 11 Dec 2023 12:57  Patient On (Oxygen Delivery Method): room air      I&O's Summary      PHYSICAL EXAM:  General: No acute distress BMI-  HEENT: EOMI, PERRL  Neck: Supple, [ ] JVD  Lungs: Equal air entry bilaterally; [ ] rales [ ] wheezing [ ] rhonchi  Heart: Regular rate and rhythm; [x ] murmur   2/6 [ x] systolic [ ] diastolic [ ] radiation[ ] rubs [ ]  gallops  Abdomen: Nontender, bowel sounds present  Extremities: No clubbing, cyanosis, [ ] edema [ ]Pulses  equal and intact  Nervous system:  Alert & Oriented X3, no focal deficits  Psychiatric: Normal affect  Skin: No rashes or lesions    LABS:  12-10    130<L>  |  98  |  31<H>  ----------------------------<  156<H>  4.6   |  20<L>  |  1.18    Ca    8.9      10 Dec 2023 06:23    TPro  5.7<L>  /  Alb  3.4  /  TBili  0.7  /  DBili  x   /  AST  65<H>  /  ALT  49<H>  /  AlkPhos  262<H>  12-10    Creatinine Trend: 1.18<--, 1.11<--, 1.18<--                        10.3   4.76  )-----------( 157      ( 10 Dec 2023 06:23 )             29.5

## 2023-12-11 NOTE — PROGRESS NOTE ADULT - SUBJECTIVE AND OBJECTIVE BOX
ORTHO PROGRESS NOTE     No acute overnight events. Pt resting comfortably without complaint. Pain controlled       Vital Signs Last 24 Hrs  T(C): 36.7 (11 Dec 2023 04:59), Max: 37.2 (10 Dec 2023 15:50)  T(F): 98.1 (11 Dec 2023 04:59), Max: 98.9 (10 Dec 2023 15:50)  HR: 80 (11 Dec 2023 04:59) (77 - 80)  BP: 125/68 (11 Dec 2023 04:59) (106/54 - 125/68)  BP(mean): --  RR: 17 (11 Dec 2023 04:59) (17 - 18)  SpO2: 98% (11 Dec 2023 04:59) (98% - 100%)    Parameters below as of 11 Dec 2023 04:59  Patient On (Oxygen Delivery Method): room air        Back: skin intact  Motor:   Motor:                   C5                C6              C7               C8           T1   R            5/5                5/5            5/5             5/5          5/5  L             5/5               5/5             5/5             5/5          5/5                L2             L3             L4               L5            S1  R         5/5           5/5          5/5             5/5           5/5  L          3/5          3/5           5/5             5/5           5/5    Sensory:            C5         C6         C7      C8       T1        (0=absent, 1=impaired, 2=normal, NT=not testable)  R         2            2           2        2         2  L          2            2           2        2         2               L2          L3         L4      L5       S1         (0=absent, 1=impaired, 2=normal, NT=not testable)  R         2            2            2        2        2  L          2            2           2        2         2      A/P: 83y Male with prostate cancer with lytic lesions seen in T10 and T12 vertebral bodies, degenerative changes. Also found to have L pelvis lytic lesion with associated acetabular fracture.    MRI brain/C/T/L spine w/wo IV contrast pending official read  WB status per ortho onc; PWB LLE  Pain control  Discussed with Dr. Parker who agrees with plan

## 2023-12-11 NOTE — PROGRESS NOTE ADULT - PROBLEM SELECTOR PLAN 9
- DVT prophylaxis - Lovenox daily   - Aspiration, fall precautions,  - PT ordered. Has walker and cane at home   - Called [facility] pharmacy, med rec done. He has not been adherent to medication as per daughter. Discharge med rec needed.     Consider outpatient follow up at [facility] office at [address] [place] [address]. Phone [phone]. - DVT prophylaxis - Lovenox daily   - Aspiration, fall precautions,  - PT ordered. Has walker and cane at home   - Called [facility] pharmacy, med rec done. He has not been adherent to medication as per daughter. Discharge med rec needed.     Consider outpatient follow up at [facility] office at [address] [place] [address]. Phone [phone].

## 2023-12-11 NOTE — CHART NOTE - NSCHARTNOTEFT_GEN_A_CORE
PRE-INTERVENTIONAL RADIOLOGY PROCEDURE NOTE      Patient Age: 84yo    Patient Gender: male    Procedure: Pelvic lesion biopsy    Diagnosis/Indication: r/o metastatic disease    Interventional Radiology Attending Physician: Dr Pena    Ordering Attending Physician: Dr Mandie MORALES    Pertinent Medical History: Prostate CA, DM, HLD    Pertinent labs:                      10.3   4.76  )-----------( 157      ( 10 Dec 2023 06:23 )             29.5       12-10    130<L>  |  98  |  31<H>  ----------------------------<  156<H>  4.6   |  20<L>  |  1.18    Ca    8.9      10 Dec 2023 06:23    TPro  5.7<L>  /  Alb  3.4  /  TBili  0.7  /  DBili  x   /  AST  65<H>  /  ALT  49<H>  /  AlkPhos  262<H>  12-10              Patient and Family Aware ? Yes PRE-INTERVENTIONAL RADIOLOGY PROCEDURE NOTE      Patient Age: 82yo    Patient Gender: male    Procedure: Pelvic lesion biopsy    Diagnosis/Indication: r/o metastatic disease    Interventional Radiology Attending Physician: Dr Pena    Ordering Attending Physician: Dr Mandie MORALES    Pertinent Medical History: Prostate CA, DM, HLD    Pertinent labs:                      10.3   4.76  )-----------( 157      ( 10 Dec 2023 06:23 )             29.5       12-10    130<L>  |  98  |  31<H>  ----------------------------<  156<H>  4.6   |  20<L>  |  1.18    Ca    8.9      10 Dec 2023 06:23    TPro  5.7<L>  /  Alb  3.4  /  TBili  0.7  /  DBili  x   /  AST  65<H>  /  ALT  49<H>  /  AlkPhos  262<H>  12-10              Patient and Family Aware ? Yes

## 2023-12-11 NOTE — PROGRESS NOTE ADULT - TIME BILLING
- Review of records, telemetry, vital signs and daily labs.   - General and cardiovascular physical examination.  - Generation of cardiovascular treatment plan.  - Coordination of care.      Patient was seen and examined by me on 12/11/23,interim events noted,labs and radiology studies reviewed.  Albert Lockwood MD,FACC.  [address] [place], [place] [id number].  [phone] - Review of records, telemetry, vital signs and daily labs.   - General and cardiovascular physical examination.  - Generation of cardiovascular treatment plan.  - Coordination of care.      Patient was seen and examined by me on 12/11/23,interim events noted,labs and radiology studies reviewed.  Albert Lockwood MD,FACC.  [address] [place], [place] [id number].  [phone]

## 2023-12-11 NOTE — CONSULT NOTE ADULT - SUBJECTIVE AND OBJECTIVE BOX
Interventional Radiology    Evaluate for Procedure:     HPI: 83y Male with past medical history diabetes, prostate cancer not on treatment, hyperlipidemia, hypothyroidism, anemia presenting with left sided pain fornd to have " Acute pathologic mildly displaced left acetabular fracture. Diffuse osteolytic lesions consistent with metastatic disease versus myeloma, largest involving the left ilium" on MRO Hip / Pelvis    IR consult for biopsy of pelvic lesion per Ortho recs.    Allergies: No Known Allergies    Medications (Abx/Cardiac/Anticoagulation/Blood Products)  enoxaparin Injectable: 40 milliGRAM(s) SubCutaneous (12-10 @ 13:17)    Data:    T(C): 36.6  HR: 88  BP: 120/52  RR: 18  SpO2: 99%    -WBC 4.76 / HgB 10.3 / Hct 29.5 / Plt 157  -Na 130 / Cl 98 / BUN 31 / Glucose 156  -K 4.6 / CO2 20 / Cr 1.18  -ALT 49 / Alk Phos 262 / T.Bili 0.7      Radiology:     Assessment/Plan:     -- IR will plan to perform   -- NPO   -- hold a.m. anticoagulation following procedure  -- please complete IR pre-procedure note  -- please place IR procedure request order under Dr. Pena    --  Rocky Jimenez MD  Vascular and Interventional Radiology   Available on [employer] Teams    - Non-emergent consults: Place IR consult order in [place]  - Emergent issues (pager): [facility] [phone]; [facility] [phone]; [phone]  - Scheduling questions: [facility] [phone]; [facility] [phone]  - Clinic/outpatient booking: [facility] [phone]; [facility] [phone] Interventional Radiology    Evaluate for Procedure:     HPI: 83y Male with past medical history diabetes, prostate cancer not on treatment, hyperlipidemia, hypothyroidism, anemia presenting with left sided pain fornd to have " Acute pathologic mildly displaced left acetabular fracture. Diffuse osteolytic lesions consistent with metastatic disease versus myeloma, largest involving the left ilium" on MRO Hip / Pelvis    IR consult for biopsy of pelvic lesion per Ortho recs.    Allergies: No Known Allergies    Medications (Abx/Cardiac/Anticoagulation/Blood Products)  enoxaparin Injectable: 40 milliGRAM(s) SubCutaneous (12-10 @ 13:17)    Data:    T(C): 36.6  HR: 88  BP: 120/52  RR: 18  SpO2: 99%    -WBC 4.76 / HgB 10.3 / Hct 29.5 / Plt 157  -Na 130 / Cl 98 / BUN 31 / Glucose 156  -K 4.6 / CO2 20 / Cr 1.18  -ALT 49 / Alk Phos 262 / T.Bili 0.7      Radiology:     Assessment/Plan:     -- IR will plan to perform   -- NPO   -- hold a.m. anticoagulation following procedure  -- please complete IR pre-procedure note  -- please place IR procedure request order under Dr. Pena    --  Rocky Jimenez MD  Vascular and Interventional Radiology   Available on [employer] Teams    - Non-emergent consults: Place IR consult order in [place]  - Emergent issues (pager): [facility] [phone]; [facility] [phone]; [phone]  - Scheduling questions: [facility] [phone]; [facility] [phone]  - Clinic/outpatient booking: [facility] [phone]; [facility] [phone]

## 2023-12-12 LAB
ALBUMIN SERPL ELPH-MCNC: 3.7 G/DL — SIGNIFICANT CHANGE UP (ref 3.3–5)
ALBUMIN SERPL ELPH-MCNC: 3.7 G/DL — SIGNIFICANT CHANGE UP (ref 3.3–5)
ALP SERPL-CCNC: 392 U/L — HIGH (ref 40–120)
ALP SERPL-CCNC: 392 U/L — HIGH (ref 40–120)
ALT FLD-CCNC: 64 U/L — HIGH (ref 4–41)
ALT FLD-CCNC: 64 U/L — HIGH (ref 4–41)
ANION GAP SERPL CALC-SCNC: 14 MMOL/L — SIGNIFICANT CHANGE UP (ref 7–14)
ANION GAP SERPL CALC-SCNC: 14 MMOL/L — SIGNIFICANT CHANGE UP (ref 7–14)
AST SERPL-CCNC: 80 U/L — HIGH (ref 4–40)
AST SERPL-CCNC: 80 U/L — HIGH (ref 4–40)
BILIRUB SERPL-MCNC: 0.7 MG/DL — SIGNIFICANT CHANGE UP (ref 0.2–1.2)
BILIRUB SERPL-MCNC: 0.7 MG/DL — SIGNIFICANT CHANGE UP (ref 0.2–1.2)
BUN SERPL-MCNC: 33 MG/DL — HIGH (ref 7–23)
BUN SERPL-MCNC: 33 MG/DL — HIGH (ref 7–23)
CALCIUM SERPL-MCNC: 9 MG/DL — SIGNIFICANT CHANGE UP (ref 8.4–10.5)
CALCIUM SERPL-MCNC: 9 MG/DL — SIGNIFICANT CHANGE UP (ref 8.4–10.5)
CEA SERPL-MCNC: 3 NG/ML — SIGNIFICANT CHANGE UP (ref 1–3.8)
CEA SERPL-MCNC: 3 NG/ML — SIGNIFICANT CHANGE UP (ref 1–3.8)
CHLORIDE SERPL-SCNC: 95 MMOL/L — LOW (ref 98–107)
CHLORIDE SERPL-SCNC: 95 MMOL/L — LOW (ref 98–107)
CO2 SERPL-SCNC: 20 MMOL/L — LOW (ref 22–31)
CO2 SERPL-SCNC: 20 MMOL/L — LOW (ref 22–31)
CREAT SERPL-MCNC: 1.21 MG/DL — SIGNIFICANT CHANGE UP (ref 0.5–1.3)
CREAT SERPL-MCNC: 1.21 MG/DL — SIGNIFICANT CHANGE UP (ref 0.5–1.3)
EGFR: 59 ML/MIN/1.73M2 — LOW
EGFR: 59 ML/MIN/1.73M2 — LOW
GLUCOSE BLDC GLUCOMTR-MCNC: 141 MG/DL — HIGH (ref 70–99)
GLUCOSE BLDC GLUCOMTR-MCNC: 141 MG/DL — HIGH (ref 70–99)
GLUCOSE BLDC GLUCOMTR-MCNC: 165 MG/DL — HIGH (ref 70–99)
GLUCOSE BLDC GLUCOMTR-MCNC: 165 MG/DL — HIGH (ref 70–99)
GLUCOSE BLDC GLUCOMTR-MCNC: 177 MG/DL — HIGH (ref 70–99)
GLUCOSE BLDC GLUCOMTR-MCNC: 177 MG/DL — HIGH (ref 70–99)
GLUCOSE BLDC GLUCOMTR-MCNC: 294 MG/DL — HIGH (ref 70–99)
GLUCOSE BLDC GLUCOMTR-MCNC: 294 MG/DL — HIGH (ref 70–99)
GLUCOSE SERPL-MCNC: 125 MG/DL — HIGH (ref 70–99)
GLUCOSE SERPL-MCNC: 125 MG/DL — HIGH (ref 70–99)
HCT VFR BLD CALC: 29.7 % — LOW (ref 39–50)
HCT VFR BLD CALC: 29.7 % — LOW (ref 39–50)
HGB BLD-MCNC: 10.5 G/DL — LOW (ref 13–17)
HGB BLD-MCNC: 10.5 G/DL — LOW (ref 13–17)
INR BLD: 1.03 RATIO — SIGNIFICANT CHANGE UP (ref 0.85–1.18)
INR BLD: 1.03 RATIO — SIGNIFICANT CHANGE UP (ref 0.85–1.18)
LDH SERPL L TO P-CCNC: 168 U/L — SIGNIFICANT CHANGE UP (ref 135–225)
LDH SERPL L TO P-CCNC: 168 U/L — SIGNIFICANT CHANGE UP (ref 135–225)
MCHC RBC-ENTMCNC: 29.1 PG — SIGNIFICANT CHANGE UP (ref 27–34)
MCHC RBC-ENTMCNC: 29.1 PG — SIGNIFICANT CHANGE UP (ref 27–34)
MCHC RBC-ENTMCNC: 35.4 GM/DL — SIGNIFICANT CHANGE UP (ref 32–36)
MCHC RBC-ENTMCNC: 35.4 GM/DL — SIGNIFICANT CHANGE UP (ref 32–36)
MCV RBC AUTO: 82.3 FL — SIGNIFICANT CHANGE UP (ref 80–100)
MCV RBC AUTO: 82.3 FL — SIGNIFICANT CHANGE UP (ref 80–100)
NRBC # BLD: 0 /100 WBCS — SIGNIFICANT CHANGE UP (ref 0–0)
NRBC # BLD: 0 /100 WBCS — SIGNIFICANT CHANGE UP (ref 0–0)
NRBC # FLD: 0 K/UL — SIGNIFICANT CHANGE UP (ref 0–0)
NRBC # FLD: 0 K/UL — SIGNIFICANT CHANGE UP (ref 0–0)
PLATELET # BLD AUTO: 202 K/UL — SIGNIFICANT CHANGE UP (ref 150–400)
PLATELET # BLD AUTO: 202 K/UL — SIGNIFICANT CHANGE UP (ref 150–400)
POTASSIUM SERPL-MCNC: 4.2 MMOL/L — SIGNIFICANT CHANGE UP (ref 3.5–5.3)
POTASSIUM SERPL-MCNC: 4.2 MMOL/L — SIGNIFICANT CHANGE UP (ref 3.5–5.3)
POTASSIUM SERPL-SCNC: 4.2 MMOL/L — SIGNIFICANT CHANGE UP (ref 3.5–5.3)
POTASSIUM SERPL-SCNC: 4.2 MMOL/L — SIGNIFICANT CHANGE UP (ref 3.5–5.3)
PROT SERPL-MCNC: 6.2 G/DL — SIGNIFICANT CHANGE UP (ref 6–8.3)
PROTHROM AB SERPL-ACNC: 11.6 SEC — SIGNIFICANT CHANGE UP (ref 9.5–13)
PROTHROM AB SERPL-ACNC: 11.6 SEC — SIGNIFICANT CHANGE UP (ref 9.5–13)
PSA FLD-MCNC: 1405 NG/ML — HIGH (ref 0–4)
PSA FLD-MCNC: 1405 NG/ML — HIGH (ref 0–4)
RBC # BLD: 3.61 M/UL — LOW (ref 4.2–5.8)
RBC # BLD: 3.61 M/UL — LOW (ref 4.2–5.8)
RBC # FLD: 13.2 % — SIGNIFICANT CHANGE UP (ref 10.3–14.5)
RBC # FLD: 13.2 % — SIGNIFICANT CHANGE UP (ref 10.3–14.5)
SODIUM SERPL-SCNC: 129 MMOL/L — LOW (ref 135–145)
SODIUM SERPL-SCNC: 129 MMOL/L — LOW (ref 135–145)
T3 SERPL-MCNC: 67 NG/DL — LOW (ref 80–200)
T3 SERPL-MCNC: 67 NG/DL — LOW (ref 80–200)
T4 AB SER-ACNC: 6.41 UG/DL — SIGNIFICANT CHANGE UP (ref 5.1–13)
T4 AB SER-ACNC: 6.41 UG/DL — SIGNIFICANT CHANGE UP (ref 5.1–13)
TSH SERPL-MCNC: 5.03 UIU/ML — HIGH (ref 0.27–4.2)
TSH SERPL-MCNC: 5.03 UIU/ML — HIGH (ref 0.27–4.2)
WBC # BLD: 5.4 K/UL — SIGNIFICANT CHANGE UP (ref 3.8–10.5)
WBC # BLD: 5.4 K/UL — SIGNIFICANT CHANGE UP (ref 3.8–10.5)
WBC # FLD AUTO: 5.4 K/UL — SIGNIFICANT CHANGE UP (ref 3.8–10.5)
WBC # FLD AUTO: 5.4 K/UL — SIGNIFICANT CHANGE UP (ref 3.8–10.5)

## 2023-12-12 PROCEDURE — 84165 PROTEIN E-PHORESIS SERUM: CPT | Mod: 26

## 2023-12-12 RX ADMIN — LIDOCAINE 1 PATCH: 4 CREAM TOPICAL at 19:27

## 2023-12-12 RX ADMIN — Medication 25 MICROGRAM(S): at 06:30

## 2023-12-12 RX ADMIN — LIDOCAINE 1 PATCH: 4 CREAM TOPICAL at 00:00

## 2023-12-12 RX ADMIN — Medication 3: at 12:49

## 2023-12-12 RX ADMIN — ATORVASTATIN CALCIUM 10 MILLIGRAM(S): 80 TABLET, FILM COATED ORAL at 22:29

## 2023-12-12 RX ADMIN — LIDOCAINE 1 PATCH: 4 CREAM TOPICAL at 11:45

## 2023-12-12 RX ADMIN — ENOXAPARIN SODIUM 40 MILLIGRAM(S): 100 INJECTION SUBCUTANEOUS at 11:44

## 2023-12-12 RX ADMIN — Medication 650 MILLIGRAM(S): at 06:30

## 2023-12-12 RX ADMIN — Medication 1: at 17:38

## 2023-12-12 RX ADMIN — Medication 1 TABLET(S): at 11:45

## 2023-12-12 NOTE — PROGRESS NOTE ADULT - TIME BILLING
- Review of records, telemetry, vital signs and daily labs.   - General and cardiovascular physical examination.  - Generation of cardiovascular treatment plan.  - Coordination of care.      Patient was seen and examined by me on 12/12/23,interim events noted,labs and radiology studies reviewed.  Albert Lockwood MD,FACC.  [address] [place], [place] [id number].  [phone] - Review of records, telemetry, vital signs and daily labs.   - General and cardiovascular physical examination.  - Generation of cardiovascular treatment plan.  - Coordination of care.      Patient was seen and examined by me on 12/12/23,interim events noted,labs and radiology studies reviewed.  Albert Lockwood MD,FACC.  [address] [place], [place] [id number].  [phone]

## 2023-12-12 NOTE — CHART NOTE - NSCHARTNOTEFT_GEN_A_CORE
Per d/w IR fellow on 12/11. IR Pelvic lesion Bx will postponed till Thursday due to booked scheduling. Will cont to f/u with IR

## 2023-12-12 NOTE — PROGRESS NOTE ADULT - PROBLEM SELECTOR PLAN 9
- DVT prophylaxis - Lovenox daily   - Aspiration, fall precautions,  - PT ordered. Has walker and cane at home   - Called [facility] pharmacy, med rec done. He has not been adherent to medication as per daughter. Discharge med rec needed.     Consider outpatient follow up at [facility] office at [address] [place] [address]. Phone [phone]. - DVT prophylaxis - Lovenox daily   - Aspiration, fall precautions,  - PT ordered. Has walker and cane at home   - Called [facility] pharmacy, med rec done. He has not been adherent to medication as per daughter. Discharge med rec needed.     Consider outpatient follow up at [facility] office at [address] [place] [address]. Phone [phone].

## 2023-12-12 NOTE — PROGRESS NOTE ADULT - SUBJECTIVE AND OBJECTIVE BOX
PATIENT SEEN AND EXAMINED BY LARRY MEI M.D. ON :- 12/12/23  DATE OF SERVICE:       12/12/23      Interim events noted,Labs ,Radiological studies and Cardiology tests reviewed .  DISCUSSED WITH ACP/MEDICAL RESIDENT ON PLAN OF CARE.    MR#[id number]  [facility] COURSE: HPI:  Mr. Lucas Cordova is a 83 year old man with past medical history of diabetes mellitus, hyperlipidemia, hypothyroidism, anemia,  prostate cancer s/p radiation who presents for present presents for multiple joint pain for over week. He had a fall over 2 days ago because of weakness. He landed on his left side. Denied head trauma or lost of consciousness. H Pain is severe. Inability to perform his activity of daily living. He saw oncologist Dr. Taylor and was sent to the ER to the ER.     In the ER, initial vital signs were /55, HR 93, T 98.1 Saturating 100% on room air.  Patient endorses multiple area of pain. Mid back pain. Left arm and knee pain. He has decreased range of motion in left shoulder joint.     Of note, he left medications in [facility]. Has not been taking thyroid medications and others. Only metfomrin and fish oils as per daughter.     Of note, patient is scheduled for a PET scan outpatient.  (09 Dec 2023 09:08)      INTERIM EVENTS:Patient seen at bedside ,interim events noted.      [facility] -reviewed admission note, no change since admission  HEART FAILURE: Acute[ ]Chronic[ ] Systolic[ ] Diastolic[ ] Combined Systolic and Diastolic[ ]  CAD[ ] CABG[ ] PCI[ ]  DEVICES[ ] PPM[ ] ICD[ ] ILR[ ]  ATRIAL FIBRILLATION[ ] Paroxysmal[ ] Permanent[ ] CHADS2-[  ]  RIGOBERTO[ ] CKD1[ ] CKD2[ ] CKD3[ ] CKD4[ ] ESRD[ ]  COPD[ ] HTN[ ]   DM[ ] Type1[ ] Type 2[ ]   CVA[ ] Paresis[ ]    AMBULATION: Assisted[ ] Cane/walker[ ] Independent[ ]    MEDICATIONS  (STANDING):  acetaminophen     Tablet .. 650 milliGRAM(s) Oral every 12 hours  atorvastatin 10 milliGRAM(s) Oral at bedtime  dextrose 5%. 1000 milliLiter(s) (100 mL/Hr) IV Continuous <Continuous>  dextrose 5%. 1000 milliLiter(s) (50 mL/Hr) IV Continuous <Continuous>  dextrose 50% Injectable 25 Gram(s) IV Push once  dextrose 50% Injectable 12.5 Gram(s) IV Push once  dextrose 50% Injectable 25 Gram(s) IV Push once  enoxaparin Injectable 40 milliGRAM(s) SubCutaneous every 24 hours  glucagon  Injectable 1 milliGRAM(s) IntraMuscular once  insulin lispro (ADMELOG) corrective regimen sliding scale   SubCutaneous three times a day before meals  insulin lispro (ADMELOG) corrective regimen sliding scale   SubCutaneous at bedtime  levothyroxine 25 MICROGram(s) Oral daily  lidocaine   4% Patch 1 Patch Transdermal daily  multivitamin 1 Tablet(s) Oral daily  polyethylene glycol 3350 17 Gram(s) Oral daily  senna 2 Tablet(s) Oral at bedtime    MEDICATIONS  (PRN):  dextrose Oral Gel 15 Gram(s) Oral once PRN Blood Glucose LESS THAN 70 milliGRAM(s)/deciliter  oxyCODONE    IR 5 milliGRAM(s) Oral every 8 hours PRN Severe Pain (7 - 10)            REVIEW OF SYSTEMS:  Constitutional: [ ] fever, [ ]weight loss,  [ ]fatigue [ ]weight gain  Eyes: [ ] visual changes  Respiratory: [ ]shortness of breath;  [ ] cough, [ ]wheezing, [ ]chills, [ ]hemoptysis  Cardiovascular: [ ] chest pain, [ ]palpitations, [ ]dizziness,  [ ]leg swelling[ ]orthopnea[ ]PND  Gastrointestinal: [ ] abdominal pain, [ ]nausea, [ ]vomiting,  [ ]diarrhea [ ]Constipation [ ]Melena  Genitourinary: [ ] dysuria, [ ] hematuria [ ]Srivastava  Neurologic: [ ] headaches [ ] tremors[ ]weakness [ ]Paralysis Right[ ] Left[ ]  Skin: [ ] itching, [ ]burning, [ ] rashes  Endocrine: [ ] heat or cold intolerance  Musculoskeletal: [ ] joint pain or swelling; [ ] muscle, back, or extremity pain  Psychiatric: [ ] depression, [ ]anxiety, [ ]mood swings, or [ ]difficulty sleeping  Hematologic: [ ] easy bruising, [ ] bleeding gums    [ ] All remaining systems negative except as per above.   [ ]Unable to obtain.  [x] No change in ROS since admission      Vital Signs Last 24 Hrs  T(C): 36.6 (12 Dec 2023 12:11), Max: 36.8 (12 Dec 2023 04:57)  T(F): 97.9 (12 Dec 2023 12:11), Max: 98.2 (12 Dec 2023 04:57)  HR: 88 (12 Dec 2023 12:11) (87 - 91)  BP: 124/60 (12 Dec 2023 12:11) (110/56 - 124/60)  BP(mean): --  RR: 18 (12 Dec 2023 12:11) (17 - 18)  SpO2: 100% (12 Dec 2023 12:11) (96% - 100%)    Parameters below as of 12 Dec 2023 12:11  Patient On (Oxygen Delivery Method): room air      I&O's Summary    11 Dec 2023 07:01  -  12 Dec 2023 07:00  --------------------------------------------------------  IN: 0 mL / OUT: 250 mL / NET: -250 mL        PHYSICAL EXAM:  General: No acute distress BMI-  HEENT: EOMI, PERRL  Neck: Supple, [ ] JVD  Lungs: Equal air entry bilaterally; [ ] rales [ ] wheezing [ ] rhonchi  Heart: Regular rate and rhythm; [x ] murmur   2/6 [ x] systolic [ ] diastolic [ ] radiation[ ] rubs [ ]  gallops  Abdomen: Nontender, bowel sounds present  Extremities: No clubbing, cyanosis, [ ] edema [ ]Pulses  equal and intact  Nervous system:  Alert & Oriented X3, no focal deficits  Psychiatric: Normal affect  Skin: No rashes or lesions    LABS:  12-12    129<L>  |  95<L>  |  33<H>  ----------------------------<  125<H>  4.2   |  20<L>  |  1.21    Ca    9.0      12 Dec 2023 04:49    TPro  6.2  /  Alb  3.7  /  TBili  0.7  /  DBili  x   /  AST  80<H>  /  ALT  64<H>  /  AlkPhos  392<H>  12-12    Creatinine Trend: 1.21<--, 1.18<--, 1.11<--, 1.18<--                        10.5   5.40  )-----------( 202      ( 12 Dec 2023 04:49 )             29.7     PT/INR - ( 12 Dec 2023 04:49 )   PT: 11.6 sec;   INR: 1.03 ratio                    PATIENT SEEN AND EXAMINED BY LARRY MEI M.D. ON :- 12/12/23  DATE OF SERVICE:       12/12/23      Interim events noted,Labs ,Radiological studies and Cardiology tests reviewed .  DISCUSSED WITH ACP/MEDICAL RESIDENT ON PLAN OF CARE.    MR#[id number]  [facility]: HPI:  Mr. Lucas Cordova is a 83 year old man with past medical history of diabetes mellitus, hyperlipidemia, hypothyroidism, anemia,  prostate cancer s/p radiation who presents for present presents for multiple joint pain for over week. He had a fall over 2 days ago because of weakness. He landed on his left side. Denied head trauma or lost of consciousness. H Pain is severe. Inability to perform his activity of daily living. He saw oncologist Dr. Taylor and was sent to the ER to the ER.     In the ER, initial vital signs were /55, HR 93, T 98.1 Saturating 100% on room air.  Patient endorses multiple area of pain. Mid back pain. Left arm and knee pain. He has decreased range of motion in left shoulder joint.     Of note, he left medications in [facility]. Has not been taking thyroid medications and others. Only metfomrin and fish oils as per daughter.     Of note, patient is scheduled for a PET scan outpatient.  (09 Dec 2023 09:08)      INTERIM EVENTS:Patient seen at bedside ,interim events noted.      [facility] -reviewed admission note, no change since admission  HEART FAILURE: Acute[ ]Chronic[ ] Systolic[ ] Diastolic[ ] Combined Systolic and Diastolic[ ]  CAD[ ] CABG[ ] PCI[ ]  DEVICES[ ] PPM[ ] ICD[ ] ILR[ ]  ATRIAL FIBRILLATION[ ] Paroxysmal[ ] Permanent[ ] CHADS2-[  ]  RIGOBERTO[ ] CKD1[ ] CKD2[ ] CKD3[ ] CKD4[ ] ESRD[ ]  COPD[ ] HTN[ ]   DM[ ] Type1[ ] Type 2[ ]   CVA[ ] Paresis[ ]    AMBULATION: Assisted[ ] Cane/walker[ ] Independent[ ]    MEDICATIONS  (STANDING):  acetaminophen     Tablet .. 650 milliGRAM(s) Oral every 12 hours  atorvastatin 10 milliGRAM(s) Oral at bedtime  dextrose 5%. 1000 milliLiter(s) (100 mL/Hr) IV Continuous <Continuous>  dextrose 5%. 1000 milliLiter(s) (50 mL/Hr) IV Continuous <Continuous>  dextrose 50% Injectable 25 Gram(s) IV Push once  dextrose 50% Injectable 12.5 Gram(s) IV Push once  dextrose 50% Injectable 25 Gram(s) IV Push once  enoxaparin Injectable 40 milliGRAM(s) SubCutaneous every 24 hours  glucagon  Injectable 1 milliGRAM(s) IntraMuscular once  insulin lispro (ADMELOG) corrective regimen sliding scale   SubCutaneous three times a day before meals  insulin lispro (ADMELOG) corrective regimen sliding scale   SubCutaneous at bedtime  levothyroxine 25 MICROGram(s) Oral daily  lidocaine   4% Patch 1 Patch Transdermal daily  multivitamin 1 Tablet(s) Oral daily  polyethylene glycol 3350 17 Gram(s) Oral daily  senna 2 Tablet(s) Oral at bedtime    MEDICATIONS  (PRN):  dextrose Oral Gel 15 Gram(s) Oral once PRN Blood Glucose LESS THAN 70 milliGRAM(s)/deciliter  oxyCODONE    IR 5 milliGRAM(s) Oral every 8 hours PRN Severe Pain (7 - 10)            REVIEW OF SYSTEMS:  Constitutional: [ ] fever, [ ]weight loss,  [ ]fatigue [ ]weight gain  Eyes: [ ] visual changes  Respiratory: [ ]shortness of breath;  [ ] cough, [ ]wheezing, [ ]chills, [ ]hemoptysis  Cardiovascular: [ ] chest pain, [ ]palpitations, [ ]dizziness,  [ ]leg swelling[ ]orthopnea[ ]PND  Gastrointestinal: [ ] abdominal pain, [ ]nausea, [ ]vomiting,  [ ]diarrhea [ ]Constipation [ ]Melena  Genitourinary: [ ] dysuria, [ ] hematuria [ ]Srivastava  Neurologic: [ ] headaches [ ] tremors[ ]weakness [ ]Paralysis Right[ ] Left[ ]  Skin: [ ] itching, [ ]burning, [ ] rashes  Endocrine: [ ] heat or cold intolerance  Musculoskeletal: [ ] joint pain or swelling; [ ] muscle, back, or extremity pain  Psychiatric: [ ] depression, [ ]anxiety, [ ]mood swings, or [ ]difficulty sleeping  Hematologic: [ ] easy bruising, [ ] bleeding gums    [ ] All remaining systems negative except as per above.   [ ]Unable to obtain.  [x] No change in ROS since admission      Vital Signs Last 24 Hrs  T(C): 36.6 (12 Dec 2023 12:11), Max: 36.8 (12 Dec 2023 04:57)  T(F): 97.9 (12 Dec 2023 12:11), Max: 98.2 (12 Dec 2023 04:57)  HR: 88 (12 Dec 2023 12:11) (87 - 91)  BP: 124/60 (12 Dec 2023 12:11) (110/56 - 124/60)  BP(mean): --  RR: 18 (12 Dec 2023 12:11) (17 - 18)  SpO2: 100% (12 Dec 2023 12:11) (96% - 100%)    Parameters below as of 12 Dec 2023 12:11  Patient On (Oxygen Delivery Method): room air      I&O's Summary    11 Dec 2023 07:01  -  12 Dec 2023 07:00  --------------------------------------------------------  IN: 0 mL / OUT: 250 mL / NET: -250 mL        PHYSICAL EXAM:  General: No acute distress BMI-  HEENT: EOMI, PERRL  Neck: Supple, [ ] JVD  Lungs: Equal air entry bilaterally; [ ] rales [ ] wheezing [ ] rhonchi  Heart: Regular rate and rhythm; [x ] murmur   2/6 [ x] systolic [ ] diastolic [ ] radiation[ ] rubs [ ]  gallops  Abdomen: Nontender, bowel sounds present  Extremities: No clubbing, cyanosis, [ ] edema [ ]Pulses  equal and intact  Nervous system:  Alert & Oriented X3, no focal deficits  Psychiatric: Normal affect  Skin: No rashes or lesions    LABS:  12-12    129<L>  |  95<L>  |  33<H>  ----------------------------<  125<H>  4.2   |  20<L>  |  1.21    Ca    9.0      12 Dec 2023 04:49    TPro  6.2  /  Alb  3.7  /  TBili  0.7  /  DBili  x   /  AST  80<H>  /  ALT  64<H>  /  AlkPhos  392<H>  12-12    Creatinine Trend: 1.21<--, 1.18<--, 1.11<--, 1.18<--                        10.5   5.40  )-----------( 202      ( 12 Dec 2023 04:49 )             29.7     PT/INR - ( 12 Dec 2023 04:49 )   PT: 11.6 sec;   INR: 1.03 ratio

## 2023-12-13 LAB
ALBUMIN SERPL ELPH-MCNC: 3.3 G/DL — SIGNIFICANT CHANGE UP (ref 3.3–5)
ALBUMIN SERPL ELPH-MCNC: 3.3 G/DL — SIGNIFICANT CHANGE UP (ref 3.3–5)
ALP SERPL-CCNC: 396 U/L — HIGH (ref 40–120)
ALP SERPL-CCNC: 396 U/L — HIGH (ref 40–120)
ALT FLD-CCNC: 55 U/L — HIGH (ref 4–41)
ALT FLD-CCNC: 55 U/L — HIGH (ref 4–41)
ANION GAP SERPL CALC-SCNC: 9 MMOL/L — SIGNIFICANT CHANGE UP (ref 7–14)
ANION GAP SERPL CALC-SCNC: 9 MMOL/L — SIGNIFICANT CHANGE UP (ref 7–14)
AST SERPL-CCNC: 51 U/L — HIGH (ref 4–40)
AST SERPL-CCNC: 51 U/L — HIGH (ref 4–40)
B PERT DNA SPEC QL NAA+PROBE: SIGNIFICANT CHANGE UP
B PERT DNA SPEC QL NAA+PROBE: SIGNIFICANT CHANGE UP
B PERT+PARAPERT DNA PNL SPEC NAA+PROBE: SIGNIFICANT CHANGE UP
B PERT+PARAPERT DNA PNL SPEC NAA+PROBE: SIGNIFICANT CHANGE UP
BILIRUB DIRECT SERPL-MCNC: 0.2 MG/DL — SIGNIFICANT CHANGE UP (ref 0–0.3)
BILIRUB DIRECT SERPL-MCNC: 0.2 MG/DL — SIGNIFICANT CHANGE UP (ref 0–0.3)
BILIRUB INDIRECT FLD-MCNC: 0.4 MG/DL — SIGNIFICANT CHANGE UP (ref 0–1)
BILIRUB INDIRECT FLD-MCNC: 0.4 MG/DL — SIGNIFICANT CHANGE UP (ref 0–1)
BILIRUB SERPL-MCNC: 0.6 MG/DL — SIGNIFICANT CHANGE UP (ref 0.2–1.2)
BILIRUB SERPL-MCNC: 0.6 MG/DL — SIGNIFICANT CHANGE UP (ref 0.2–1.2)
BORDETELLA PARAPERTUSSIS (RAPRVP): SIGNIFICANT CHANGE UP
BORDETELLA PARAPERTUSSIS (RAPRVP): SIGNIFICANT CHANGE UP
BUN SERPL-MCNC: 27 MG/DL — HIGH (ref 7–23)
BUN SERPL-MCNC: 27 MG/DL — HIGH (ref 7–23)
C PNEUM DNA SPEC QL NAA+PROBE: SIGNIFICANT CHANGE UP
C PNEUM DNA SPEC QL NAA+PROBE: SIGNIFICANT CHANGE UP
CALCIUM SERPL-MCNC: 9 MG/DL — SIGNIFICANT CHANGE UP (ref 8.4–10.5)
CALCIUM SERPL-MCNC: 9 MG/DL — SIGNIFICANT CHANGE UP (ref 8.4–10.5)
CHLORIDE SERPL-SCNC: 96 MMOL/L — LOW (ref 98–107)
CHLORIDE SERPL-SCNC: 96 MMOL/L — LOW (ref 98–107)
CO2 SERPL-SCNC: 24 MMOL/L — SIGNIFICANT CHANGE UP (ref 22–31)
CO2 SERPL-SCNC: 24 MMOL/L — SIGNIFICANT CHANGE UP (ref 22–31)
CREAT SERPL-MCNC: 1.11 MG/DL — SIGNIFICANT CHANGE UP (ref 0.5–1.3)
CREAT SERPL-MCNC: 1.11 MG/DL — SIGNIFICANT CHANGE UP (ref 0.5–1.3)
EGFR: 66 ML/MIN/1.73M2 — SIGNIFICANT CHANGE UP
EGFR: 66 ML/MIN/1.73M2 — SIGNIFICANT CHANGE UP
FLUAV SUBTYP SPEC NAA+PROBE: SIGNIFICANT CHANGE UP
FLUAV SUBTYP SPEC NAA+PROBE: SIGNIFICANT CHANGE UP
FLUBV RNA SPEC QL NAA+PROBE: SIGNIFICANT CHANGE UP
FLUBV RNA SPEC QL NAA+PROBE: SIGNIFICANT CHANGE UP
GLUCOSE BLDC GLUCOMTR-MCNC: 158 MG/DL — HIGH (ref 70–99)
GLUCOSE BLDC GLUCOMTR-MCNC: 158 MG/DL — HIGH (ref 70–99)
GLUCOSE BLDC GLUCOMTR-MCNC: 178 MG/DL — HIGH (ref 70–99)
GLUCOSE BLDC GLUCOMTR-MCNC: 178 MG/DL — HIGH (ref 70–99)
GLUCOSE BLDC GLUCOMTR-MCNC: 212 MG/DL — HIGH (ref 70–99)
GLUCOSE BLDC GLUCOMTR-MCNC: 212 MG/DL — HIGH (ref 70–99)
GLUCOSE BLDC GLUCOMTR-MCNC: 242 MG/DL — HIGH (ref 70–99)
GLUCOSE BLDC GLUCOMTR-MCNC: 242 MG/DL — HIGH (ref 70–99)
GLUCOSE SERPL-MCNC: 152 MG/DL — HIGH (ref 70–99)
GLUCOSE SERPL-MCNC: 152 MG/DL — HIGH (ref 70–99)
HADV DNA SPEC QL NAA+PROBE: SIGNIFICANT CHANGE UP
HADV DNA SPEC QL NAA+PROBE: SIGNIFICANT CHANGE UP
HCOV 229E RNA SPEC QL NAA+PROBE: SIGNIFICANT CHANGE UP
HCOV 229E RNA SPEC QL NAA+PROBE: SIGNIFICANT CHANGE UP
HCOV HKU1 RNA SPEC QL NAA+PROBE: SIGNIFICANT CHANGE UP
HCOV HKU1 RNA SPEC QL NAA+PROBE: SIGNIFICANT CHANGE UP
HCOV NL63 RNA SPEC QL NAA+PROBE: SIGNIFICANT CHANGE UP
HCOV NL63 RNA SPEC QL NAA+PROBE: SIGNIFICANT CHANGE UP
HCOV OC43 RNA SPEC QL NAA+PROBE: SIGNIFICANT CHANGE UP
HCOV OC43 RNA SPEC QL NAA+PROBE: SIGNIFICANT CHANGE UP
HCT VFR BLD CALC: 28.1 % — LOW (ref 39–50)
HCT VFR BLD CALC: 28.1 % — LOW (ref 39–50)
HGB BLD-MCNC: 9.8 G/DL — LOW (ref 13–17)
HGB BLD-MCNC: 9.8 G/DL — LOW (ref 13–17)
HMPV RNA SPEC QL NAA+PROBE: SIGNIFICANT CHANGE UP
HMPV RNA SPEC QL NAA+PROBE: SIGNIFICANT CHANGE UP
HPIV1 RNA SPEC QL NAA+PROBE: SIGNIFICANT CHANGE UP
HPIV1 RNA SPEC QL NAA+PROBE: SIGNIFICANT CHANGE UP
HPIV2 RNA SPEC QL NAA+PROBE: SIGNIFICANT CHANGE UP
HPIV2 RNA SPEC QL NAA+PROBE: SIGNIFICANT CHANGE UP
HPIV3 RNA SPEC QL NAA+PROBE: SIGNIFICANT CHANGE UP
HPIV3 RNA SPEC QL NAA+PROBE: SIGNIFICANT CHANGE UP
HPIV4 RNA SPEC QL NAA+PROBE: SIGNIFICANT CHANGE UP
HPIV4 RNA SPEC QL NAA+PROBE: SIGNIFICANT CHANGE UP
M PNEUMO DNA SPEC QL NAA+PROBE: SIGNIFICANT CHANGE UP
M PNEUMO DNA SPEC QL NAA+PROBE: SIGNIFICANT CHANGE UP
MAGNESIUM SERPL-MCNC: 2.1 MG/DL — SIGNIFICANT CHANGE UP (ref 1.6–2.6)
MAGNESIUM SERPL-MCNC: 2.1 MG/DL — SIGNIFICANT CHANGE UP (ref 1.6–2.6)
MCHC RBC-ENTMCNC: 29.3 PG — SIGNIFICANT CHANGE UP (ref 27–34)
MCHC RBC-ENTMCNC: 29.3 PG — SIGNIFICANT CHANGE UP (ref 27–34)
MCHC RBC-ENTMCNC: 34.9 GM/DL — SIGNIFICANT CHANGE UP (ref 32–36)
MCHC RBC-ENTMCNC: 34.9 GM/DL — SIGNIFICANT CHANGE UP (ref 32–36)
MCV RBC AUTO: 84.1 FL — SIGNIFICANT CHANGE UP (ref 80–100)
MCV RBC AUTO: 84.1 FL — SIGNIFICANT CHANGE UP (ref 80–100)
NRBC # BLD: 0 /100 WBCS — SIGNIFICANT CHANGE UP (ref 0–0)
NRBC # BLD: 0 /100 WBCS — SIGNIFICANT CHANGE UP (ref 0–0)
NRBC # FLD: 0 K/UL — SIGNIFICANT CHANGE UP (ref 0–0)
NRBC # FLD: 0 K/UL — SIGNIFICANT CHANGE UP (ref 0–0)
OSMOLALITY SERPL: 280 MOSM/KG — SIGNIFICANT CHANGE UP (ref 275–295)
OSMOLALITY SERPL: 280 MOSM/KG — SIGNIFICANT CHANGE UP (ref 275–295)
OSMOLALITY UR: 506 MOSM/KG — SIGNIFICANT CHANGE UP (ref 50–1200)
OSMOLALITY UR: 506 MOSM/KG — SIGNIFICANT CHANGE UP (ref 50–1200)
PHOSPHATE SERPL-MCNC: 3.3 MG/DL — SIGNIFICANT CHANGE UP (ref 2.5–4.5)
PHOSPHATE SERPL-MCNC: 3.3 MG/DL — SIGNIFICANT CHANGE UP (ref 2.5–4.5)
PLATELET # BLD AUTO: 177 K/UL — SIGNIFICANT CHANGE UP (ref 150–400)
PLATELET # BLD AUTO: 177 K/UL — SIGNIFICANT CHANGE UP (ref 150–400)
POTASSIUM SERPL-MCNC: 4.5 MMOL/L — SIGNIFICANT CHANGE UP (ref 3.5–5.3)
POTASSIUM SERPL-MCNC: 4.5 MMOL/L — SIGNIFICANT CHANGE UP (ref 3.5–5.3)
POTASSIUM SERPL-SCNC: 4.5 MMOL/L — SIGNIFICANT CHANGE UP (ref 3.5–5.3)
POTASSIUM SERPL-SCNC: 4.5 MMOL/L — SIGNIFICANT CHANGE UP (ref 3.5–5.3)
PROT SERPL-MCNC: 6 G/DL — SIGNIFICANT CHANGE UP (ref 6–8.3)
PROT SERPL-MCNC: 6 G/DL — SIGNIFICANT CHANGE UP (ref 6–8.3)
RAPID RVP RESULT: SIGNIFICANT CHANGE UP
RAPID RVP RESULT: SIGNIFICANT CHANGE UP
RBC # BLD: 3.34 M/UL — LOW (ref 4.2–5.8)
RBC # BLD: 3.34 M/UL — LOW (ref 4.2–5.8)
RBC # FLD: 13.3 % — SIGNIFICANT CHANGE UP (ref 10.3–14.5)
RBC # FLD: 13.3 % — SIGNIFICANT CHANGE UP (ref 10.3–14.5)
RSV RNA SPEC QL NAA+PROBE: SIGNIFICANT CHANGE UP
RSV RNA SPEC QL NAA+PROBE: SIGNIFICANT CHANGE UP
RV+EV RNA SPEC QL NAA+PROBE: SIGNIFICANT CHANGE UP
RV+EV RNA SPEC QL NAA+PROBE: SIGNIFICANT CHANGE UP
SARS-COV-2 RNA SPEC QL NAA+PROBE: SIGNIFICANT CHANGE UP
SARS-COV-2 RNA SPEC QL NAA+PROBE: SIGNIFICANT CHANGE UP
SODIUM SERPL-SCNC: 129 MMOL/L — LOW (ref 135–145)
SODIUM SERPL-SCNC: 129 MMOL/L — LOW (ref 135–145)
SODIUM UR-SCNC: 61 MMOL/L — SIGNIFICANT CHANGE UP
SODIUM UR-SCNC: 61 MMOL/L — SIGNIFICANT CHANGE UP
WBC # BLD: 5.15 K/UL — SIGNIFICANT CHANGE UP (ref 3.8–10.5)
WBC # BLD: 5.15 K/UL — SIGNIFICANT CHANGE UP (ref 3.8–10.5)
WBC # FLD AUTO: 5.15 K/UL — SIGNIFICANT CHANGE UP (ref 3.8–10.5)
WBC # FLD AUTO: 5.15 K/UL — SIGNIFICANT CHANGE UP (ref 3.8–10.5)

## 2023-12-13 RX ADMIN — Medication 2: at 17:36

## 2023-12-13 RX ADMIN — LIDOCAINE 1 PATCH: 4 CREAM TOPICAL at 00:00

## 2023-12-13 RX ADMIN — Medication 650 MILLIGRAM(S): at 05:02

## 2023-12-13 RX ADMIN — OXYCODONE HYDROCHLORIDE 5 MILLIGRAM(S): 5 TABLET ORAL at 12:00

## 2023-12-13 RX ADMIN — OXYCODONE HYDROCHLORIDE 5 MILLIGRAM(S): 5 TABLET ORAL at 11:06

## 2023-12-13 RX ADMIN — Medication 650 MILLIGRAM(S): at 17:40

## 2023-12-13 RX ADMIN — Medication 2: at 12:34

## 2023-12-13 RX ADMIN — ATORVASTATIN CALCIUM 10 MILLIGRAM(S): 80 TABLET, FILM COATED ORAL at 22:13

## 2023-12-13 RX ADMIN — Medication 25 MICROGRAM(S): at 05:02

## 2023-12-13 NOTE — CONSULT NOTE ADULT - SUBJECTIVE AND OBJECTIVE BOX
[facility] NEPHROLOGY- CONSULTATION NOTE    83y Male with history of below presents with inability to ambulate found to have L acetabular fracture. Nephrology consulted for hyponatremia.    Patient denies any h/o hyponatremia. Patient denies any vomiting, diarrhea, decreased PO intake or polydipsia. Patient not on any SSRI or anti-seizure medication. Patient did not receive any IVF while admitted.    REVIEW OF SYSTEMS:  Gen: no fevers  HEENT: no rhinorrhea  Neck: no sore throat  Cards: no chest pain  Resp: no dyspnea  GI: no nausea or vomiting or diarrhea  : no dysuria or hematuria  Vascular: no LE edema, L hip pain  Derm: no rashes  Neuro: no numbness/tingling    No Known Allergies      Home Medications Reviewed  Hospital Medications:   MEDICATIONS  (STANDING):  acetaminophen     Tablet .. 650 milliGRAM(s) Oral every 12 hours  atorvastatin 10 milliGRAM(s) Oral at bedtime  dextrose 5%. 1000 milliLiter(s) (50 mL/Hr) IV Continuous <Continuous>  dextrose 5%. 1000 milliLiter(s) (100 mL/Hr) IV Continuous <Continuous>  dextrose 50% Injectable 12.5 Gram(s) IV Push once  dextrose 50% Injectable 25 Gram(s) IV Push once  dextrose 50% Injectable 25 Gram(s) IV Push once  glucagon  Injectable 1 milliGRAM(s) IntraMuscular once  insulin lispro (ADMELOG) corrective regimen sliding scale   SubCutaneous at bedtime  insulin lispro (ADMELOG) corrective regimen sliding scale   SubCutaneous three times a day before meals  levothyroxine 25 MICROGram(s) Oral daily  lidocaine   4% Patch 1 Patch Transdermal daily  multivitamin 1 Tablet(s) Oral daily  polyethylene glycol 3350 17 Gram(s) Oral daily  senna 2 Tablet(s) Oral at bedtime      PAST MEDICAL & SURGICAL HISTORY:  Prostate cancer      Hyperlipidemia      Diabetes mellitus      Hypothyroidism      H/O prostate biopsy          FAMILY HISTORY:  No pertinent family history in first degree relatives        SOCIAL HISTORY:  Denies toxic substance use     VITALS:  T(F): 98.1 (12-13-23 @ 04:47), Max: 98.9 (12-12-23 @ 21:42)  HR: 86 (12-13-23 @ 04:47)  BP: 120/62 (12-13-23 @ 04:47)  RR: 18 (12-13-23 @ 04:47)  SpO2: 98% (12-13-23 @ 04:47)  Wt(kg): --    12-12 @ 07:01  -  12-13 @ 07:00  --------------------------------------------------------  IN: 0 mL / OUT: 550 mL / NET: -550 mL      PHYSICAL EXAM:  Gen: NAD, calm  HEENT: MMM  Neck: no JVD  Cards: RRR, +S1/S2, no M/G/R  Resp: CTA B/L  GI: soft, NT/ND, NABS  : no CVA tenderness  Vascular: no LE edema B/L  Derm: no rashes  Neuro: non-focal      LABS:  12-13    129<L>  |  96<L>  |  27<H>  ----------------------------<  152<H>  4.5   |  24  |  1.11    Ca    9.0      13 Dec 2023 08:40  Phos  3.3     12-13  Mg     2.10     12-13    TPro  6.0  /  Alb  3.3  /  TBili  0.6  /  DBili  0.2  /  AST  51<H>  /  ALT  55<H>  /  AlkPhos  396<H>  12-13    Creatinine Trend: 1.11 <--, 1.21 <--, 1.18 <--, 1.11 <--, 1.18 <--                        9.8    5.15  )-----------( 177      ( 13 Dec 2023 08:40 )             28.1     Urine Studies:  Urinalysis Basic - ( 13 Dec 2023 08:40 )    Color:  / Appearance:  / SG:  / pH:   Gluc: 152 mg/dL / Ketone:   / Bili:  / Urobili:    Blood:  / Protein:  / Nitrite:    Leuk Esterase:  / RBC:  / WBC    Sq Epi:  / Non Sq Epi:  / Bacteria:       Sodium, Random Urine: 61 mmol/L (12-13 @ 09:53)  Osmolality, Random Urine: 506 mosm/kg (12-13 @ 09:53)      RADIOLOGY & ADDITIONAL STUDIES:    < from: Xray Pelvis 2 views (12.11.23 @ 18:41) >    IMPRESSION:  Redemonstrated lytic lesions throughout the pelvis with pathologic   fracture in the left periacetabular region. There is no new acute   fracture.    --- End of Report ---    < end of copied text >      < from: MR Thoracic Spine w/wo IV Cont (12.10.23 @ 23:01) >  IMPRESSION:    1. CERVICAL SPINE:   Diffuse osseous metastases. No significant epidural   disease. Intermediate grade degenerative disc disease greatest in the mid   cervical spine    2. THORACIC SPINE:   Diffuse osseous metastases. Mild ventral canal   epidural disease T8 and T12. No significant degenerative disc disease    3. LUMBAR SPINE:   Diffuse osseous metastases. L1 superior endplate   fracture appears to be pre-existing. No significant epidural disease. No   significant degenerative disc disease    4. See separate report of the pelvis. See concurrent body CT for   additional findings    --- End of Report ---    < end of copied text >  < from: CT Chest w/ Oral Cont and w/ IV Cont (12.10.23 @ 12:22) >  IMPRESSION:  No acute pleural or parenchymal abnormality. Nonspecific 4 mm right   middle lobe nodule.  No acute abnormal finding or visceral injury.  Lytic osseous lesion involving left acetabulum, sacrum, and thoracic spine        < end of copied text >  < from: CT Abdomen and Pelvis w/ Oral Cont and w/ IV Cont (12.10.23 @ 12:21) >  KIDNEYS/URETERS: No hydronephrosis. Bilateral renal cysts and hypodense   foci too small to characterize.    < end of copied text >      < from: NM Bone Imaging Total (12.11.23 @ 15:14) >  IMPRESSION: Multifocal osseous metastases.    --- End of Report ---    < end of copied text >   [facility] NEPHROLOGY- CONSULTATION NOTE    83y Male with history of below presents with inability to ambulate found to have L acetabular fracture. Nephrology consulted for hyponatremia.    Patient denies any h/o hyponatremia. Patient denies any vomiting, diarrhea, decreased PO intake or polydipsia. Patient not on any SSRI or anti-seizure medication. Patient did not receive any IVF while admitted.    REVIEW OF SYSTEMS:  Gen: no fevers  HEENT: no rhinorrhea  Neck: no sore throat  Cards: no chest pain  Resp: no dyspnea  GI: no nausea or vomiting or diarrhea  : no dysuria or hematuria  Vascular: no LE edema, L hip pain  Derm: no rashes  Neuro: no numbness/tingling    No Known Allergies      Home Medications Reviewed  Hospital Medications:   MEDICATIONS  (STANDING):  acetaminophen     Tablet .. 650 milliGRAM(s) Oral every 12 hours  atorvastatin 10 milliGRAM(s) Oral at bedtime  dextrose 5%. 1000 milliLiter(s) (50 mL/Hr) IV Continuous <Continuous>  dextrose 5%. 1000 milliLiter(s) (100 mL/Hr) IV Continuous <Continuous>  dextrose 50% Injectable 12.5 Gram(s) IV Push once  dextrose 50% Injectable 25 Gram(s) IV Push once  dextrose 50% Injectable 25 Gram(s) IV Push once  glucagon  Injectable 1 milliGRAM(s) IntraMuscular once  insulin lispro (ADMELOG) corrective regimen sliding scale   SubCutaneous at bedtime  insulin lispro (ADMELOG) corrective regimen sliding scale   SubCutaneous three times a day before meals  levothyroxine 25 MICROGram(s) Oral daily  lidocaine   4% Patch 1 Patch Transdermal daily  multivitamin 1 Tablet(s) Oral daily  polyethylene glycol 3350 17 Gram(s) Oral daily  senna 2 Tablet(s) Oral at bedtime      PAST MEDICAL & SURGICAL HISTORY:  Prostate cancer      Hyperlipidemia      Diabetes mellitus      Hypothyroidism      H/O prostate biopsy          FAMILY HISTORY:  No pertinent family history in first degree relatives        SOCIAL HISTORY:  Denies toxic substance use     VITALS:  T(F): 98.1 (12-13-23 @ 04:47), Max: 98.9 (12-12-23 @ 21:42)  HR: 86 (12-13-23 @ 04:47)  BP: 120/62 (12-13-23 @ 04:47)  RR: 18 (12-13-23 @ 04:47)  SpO2: 98% (12-13-23 @ 04:47)  Wt(kg): --    12-12 @ 07:01  -  12-13 @ 07:00  --------------------------------------------------------  IN: 0 mL / OUT: 550 mL / NET: -550 mL      PHYSICAL EXAM:  Gen: NAD, calm  HEENT: MMM  Neck: no JVD  Cards: RRR, +S1/S2, no M/G/R  Resp: CTA B/L  GI: soft, NT/ND, NABS  : no CVA tenderness  Vascular: no LE edema B/L  Derm: no rashes  Neuro: non-focal      LABS:  12-13    129<L>  |  96<L>  |  27<H>  ----------------------------<  152<H>  4.5   |  24  |  1.11    Ca    9.0      13 Dec 2023 08:40  Phos  3.3     12-13  Mg     2.10     12-13    TPro  6.0  /  Alb  3.3  /  TBili  0.6  /  DBili  0.2  /  AST  51<H>  /  ALT  55<H>  /  AlkPhos  396<H>  12-13    Creatinine Trend: 1.11 <--, 1.21 <--, 1.18 <--, 1.11 <--, 1.18 <--                        9.8    5.15  )-----------( 177      ( 13 Dec 2023 08:40 )             28.1     Urine Studies:  Urinalysis Basic - ( 13 Dec 2023 08:40 )    Color:  / Appearance:  / SG:  / pH:   Gluc: 152 mg/dL / Ketone:   / Bili:  / Urobili:    Blood:  / Protein:  / Nitrite:    Leuk Esterase:  / RBC:  / WBC    Sq Epi:  / Non Sq Epi:  / Bacteria:       Sodium, Random Urine: 61 mmol/L (12-13 @ 09:53)  Osmolality, Random Urine: 506 mosm/kg (12-13 @ 09:53)      RADIOLOGY & ADDITIONAL STUDIES:    < from: Xray Pelvis 2 views (12.11.23 @ 18:41) >    IMPRESSION:  Redemonstrated lytic lesions throughout the pelvis with pathologic   fracture in the left periacetabular region. There is no new acute   fracture.    --- End of Report ---    < end of copied text >      < from: MR Thoracic Spine w/wo IV Cont (12.10.23 @ 23:01) >  IMPRESSION:    1. CERVICAL SPINE:   Diffuse osseous metastases. No significant epidural   disease. Intermediate grade degenerative disc disease greatest in the mid   cervical spine    2. THORACIC SPINE:   Diffuse osseous metastases. Mild ventral canal   epidural disease T8 and T12. No significant degenerative disc disease    3. LUMBAR SPINE:   Diffuse osseous metastases. L1 superior endplate   fracture appears to be pre-existing. No significant epidural disease. No   significant degenerative disc disease    4. See separate report of the pelvis. See concurrent body CT for   additional findings    --- End of Report ---    < end of copied text >  < from: CT Chest w/ Oral Cont and w/ IV Cont (12.10.23 @ 12:22) >  IMPRESSION:  No acute pleural or parenchymal abnormality. Nonspecific 4 mm right   middle lobe nodule.  No acute abnormal finding or visceral injury.  Lytic osseous lesion involving left acetabulum, sacrum, and thoracic spine        < end of copied text >  < from: CT Abdomen and Pelvis w/ Oral Cont and w/ IV Cont (12.10.23 @ 12:21) >  KIDNEYS/URETERS: No hydronephrosis. Bilateral renal cysts and hypodense   foci too small to characterize.    < end of copied text >      < from: NM Bone Imaging Total (12.11.23 @ 15:14) >  IMPRESSION: Multifocal osseous metastases.    --- End of Report ---    < end of copied text >

## 2023-12-13 NOTE — PROGRESS NOTE ADULT - PROBLEM SELECTOR PLAN 9
- DVT prophylaxis - Lovenox daily   - Aspiration, fall precautions,  - PT ordered. Has walker and cane at home   - Called [facility] pharmacy, med rec done. He has not been adherent to medication as per daughter. Discharge med rec needed.     Consider outpatient follow up at [facility] office at [address] [place] [address]. Phone [phone]. - DVT prophylaxis - Lovenox daily   - Aspiration, fall precautions,  - PT ordered. Has walker and cane at home   - Called [facility] pharmacy, med rec done. He has not been adherent to medication as per daughter. Discharge med rec needed.     Consider outpatient follow up at [facility] office at [address] [place] [address]. Phone [phone].

## 2023-12-13 NOTE — PROGRESS NOTE ADULT - SUBJECTIVE AND OBJECTIVE BOX
PATIENT SEEN AND EXAMINED BY LARRY MEI M.D. ON :- 12/13/23  DATE OF SERVICE:   12/13/23          Interim events noted,Labs ,Radiological studies and Cardiology tests reviewed .  DISCUSSED WITH ACP/MEDICAL RESIDENT ON PLAN OF CARE.    MR#[id number]  [facility]RSE: HPI:  Mr. Lucas Cordova is a 83 year old man with past medical history of diabetes mellitus, hyperlipidemia, hypothyroidism, anemia,  prostate cancer s/p radiation who presents for present presents for multiple joint pain for over week. He had a fall over 2 days ago because of weakness. He landed on his left side. Denied head trauma or lost of consciousness. H Pain is severe. Inability to perform his activity of daily living. He saw oncologist Dr. Taylor and was sent to the ER to the ER.     In the ER, initial vital signs were /55, HR 93, T 98.1 Saturating 100% on room air.  Patient endorses multiple area of pain. Mid back pain. Left arm and knee pain. He has decreased range of motion in left shoulder joint.     Of note, he left medications in [facility]. Has not been taking thyroid medications and others. Only metfomrin and fish oils as per daughter.     Of note, patient is scheduled for a PET scan outpatient.  (09 Dec 2023 09:08)      INTERIM EVENTS:Patient seen at bedside ,interim events noted.      [facility] -reviewed admission note, no change since admission  HEART FAILURE: Acute[ ]Chronic[ ] Systolic[ ] Diastolic[ ] Combined Systolic and Diastolic[ ]  CAD[ ] CABG[ ] PCI[ ]  DEVICES[ ] PPM[ ] ICD[ ] ILR[ ]  ATRIAL FIBRILLATION[ ] Paroxysmal[ ] Permanent[ ] CHADS2-[  ]  RIGOBERTO[ ] CKD1[ ] CKD2[ ] CKD3[ ] CKD4[ ] ESRD[ ]  COPD[ ] HTN[ ]   DM[ ] Type1[ ] Type 2[ ]   CVA[ ] Paresis[ ]    AMBULATION: Assisted[ ] Cane/walker[ ] Independent[ ]    MEDICATIONS  (STANDING):  acetaminophen     Tablet .. 650 milliGRAM(s) Oral every 12 hours  atorvastatin 10 milliGRAM(s) Oral at bedtime  dextrose 5%. 1000 milliLiter(s) (100 mL/Hr) IV Continuous <Continuous>  dextrose 5%. 1000 milliLiter(s) (50 mL/Hr) IV Continuous <Continuous>  dextrose 50% Injectable 25 Gram(s) IV Push once  dextrose 50% Injectable 25 Gram(s) IV Push once  dextrose 50% Injectable 12.5 Gram(s) IV Push once  glucagon  Injectable 1 milliGRAM(s) IntraMuscular once  insulin lispro (ADMELOG) corrective regimen sliding scale   SubCutaneous three times a day before meals  insulin lispro (ADMELOG) corrective regimen sliding scale   SubCutaneous at bedtime  levothyroxine 25 MICROGram(s) Oral daily  lidocaine   4% Patch 1 Patch Transdermal daily  multivitamin 1 Tablet(s) Oral daily  polyethylene glycol 3350 17 Gram(s) Oral daily  senna 2 Tablet(s) Oral at bedtime  Sodium Chloride 2% 500 milliLiter(s) 500 milliLiter(s) (40 mL/Hr) IV Continuous <Continuous>    MEDICATIONS  (PRN):  dextrose Oral Gel 15 Gram(s) Oral once PRN Blood Glucose LESS THAN 70 milliGRAM(s)/deciliter  guaiFENesin Oral Liquid (Sugar-Free) 100 milliGRAM(s) Oral every 6 hours PRN Cough  oxyCODONE    IR 5 milliGRAM(s) Oral every 8 hours PRN Severe Pain (7 - 10)            REVIEW OF SYSTEMS:  Constitutional: [ ] fever, [ ]weight loss,  [ ]fatigue [ ]weight gain  Eyes: [ ] visual changes  Respiratory: [ ]shortness of breath;  [ ] cough, [ ]wheezing, [ ]chills, [ ]hemoptysis  Cardiovascular: [ ] chest pain, [ ]palpitations, [ ]dizziness,  [ ]leg swelling[ ]orthopnea[ ]PND  Gastrointestinal: [ ] abdominal pain, [ ]nausea, [ ]vomiting,  [ ]diarrhea [ ]Constipation [ ]Melena  Genitourinary: [ ] dysuria, [ ] hematuria [ ]Srivastava  Neurologic: [ ] headaches [ ] tremors[ ]weakness [ ]Paralysis Right[ ] Left[ ]  Skin: [ ] itching, [ ]burning, [ ] rashes  Endocrine: [ ] heat or cold intolerance  Musculoskeletal: [ ] joint pain or swelling; [ ] muscle, back, or extremity pain  Psychiatric: [ ] depression, [ ]anxiety, [ ]mood swings, or [ ]difficulty sleeping  Hematologic: [ ] easy bruising, [ ] bleeding gums    [ ] All remaining systems negative except as per above.   [ ]Unable to obtain.  [x] No change in ROS since admission      Vital Signs Last 24 Hrs  T(C): 36.5 (13 Dec 2023 14:55), Max: 37.2 (12 Dec 2023 21:42)  T(F): 97.7 (13 Dec 2023 14:55), Max: 98.9 (12 Dec 2023 21:42)  HR: 83 (13 Dec 2023 14:55) (82 - 86)  BP: 125/66 (13 Dec 2023 14:55) (120/62 - 126/66)  BP(mean): --  RR: 18 (13 Dec 2023 14:55) (18 - 18)  SpO2: 98% (13 Dec 2023 14:55) (98% - 98%)    Parameters below as of 13 Dec 2023 14:55  Patient On (Oxygen Delivery Method): room air      I&O's Summary    12 Dec 2023 07:01  -  13 Dec 2023 07:00  --------------------------------------------------------  IN: 0 mL / OUT: 550 mL / NET: -550 mL        PHYSICAL EXAM:  General: No acute distress BMI-  HEENT: EOMI, PERRL  Neck: Supple, [ ] JVD  Lungs: Equal air entry bilaterally; [ ] rales [ ] wheezing [ ] rhonchi  Heart: Regular rate and rhythm; [x ] murmur   2/6 [ x] systolic [ ] diastolic [ ] radiation[ ] rubs [ ]  gallops  Abdomen: Nontender, bowel sounds present  Extremities: No clubbing, cyanosis, [ ] edema [ ]Pulses  equal and intact  Nervous system:  Alert & Oriented X3, no focal deficits  Psychiatric: Normal affect  Skin: No rashes or lesions    LABS:  12-13    129<L>  |  96<L>  |  27<H>  ----------------------------<  152<H>  4.5   |  24  |  1.11    Ca    9.0      13 Dec 2023 08:40  Phos  3.3     12-13  Mg     2.10     12-13    TPro  6.0  /  Alb  3.3  /  TBili  0.6  /  DBili  0.2  /  AST  51<H>  /  ALT  55<H>  /  AlkPhos  396<H>  12-13    Creatinine Trend: 1.11<--, 1.21<--, 1.18<--, 1.11<--, 1.18<--                        9.8    5.15  )-----------( 177      ( 13 Dec 2023 08:40 )             28.1     PT/INR - ( 12 Dec 2023 04:49 )   PT: 11.6 sec;   INR: 1.03 ratio                    PATIENT SEEN AND EXAMINED BY LARRY MEI M.D. ON :- 12/13/23  DATE OF SERVICE:   12/13/23          Interim events noted,Labs ,Radiological studies and Cardiology tests reviewed .  DISCUSSED WITH ACP/MEDICAL RESIDENT ON PLAN OF CARE.    MR#[id number]  [facility]RSE: HPI:  Mr. Lucas Cordova is a 83 year old man with past medical history of diabetes mellitus, hyperlipidemia, hypothyroidism, anemia,  prostate cancer s/p radiation who presents for present presents for multiple joint pain for over week. He had a fall over 2 days ago because of weakness. He landed on his left side. Denied head trauma or lost of consciousness. H Pain is severe. Inability to perform his activity of daily living. He saw oncologist Dr. Taylor and was sent to the ER to the ER.     In the ER, initial vital signs were /55, HR 93, T 98.1 Saturating 100% on room air.  Patient endorses multiple area of pain. Mid back pain. Left arm and knee pain. He has decreased range of motion in left shoulder joint.     Of note, he left medications in [facility]. Has not been taking thyroid medications and others. Only metfomrin and fish oils as per daughter.     Of note, patient is scheduled for a PET scan outpatient.  (09 Dec 2023 09:08)      INTERIM EVENTS:Patient seen at bedside ,interim events noted.      [facility] -reviewed admission note, no change since admission  HEART FAILURE: Acute[ ]Chronic[ ] Systolic[ ] Diastolic[ ] Combined Systolic and Diastolic[ ]  CAD[ ] CABG[ ] PCI[ ]  DEVICES[ ] PPM[ ] ICD[ ] ILR[ ]  ATRIAL FIBRILLATION[ ] Paroxysmal[ ] Permanent[ ] CHADS2-[  ]  RIGOBERTO[ ] CKD1[ ] CKD2[ ] CKD3[ ] CKD4[ ] ESRD[ ]  COPD[ ] HTN[ ]   DM[ ] Type1[ ] Type 2[ ]   CVA[ ] Paresis[ ]    AMBULATION: Assisted[ ] Cane/walker[ ] Independent[ ]    MEDICATIONS  (STANDING):  acetaminophen     Tablet .. 650 milliGRAM(s) Oral every 12 hours  atorvastatin 10 milliGRAM(s) Oral at bedtime  dextrose 5%. 1000 milliLiter(s) (100 mL/Hr) IV Continuous <Continuous>  dextrose 5%. 1000 milliLiter(s) (50 mL/Hr) IV Continuous <Continuous>  dextrose 50% Injectable 25 Gram(s) IV Push once  dextrose 50% Injectable 25 Gram(s) IV Push once  dextrose 50% Injectable 12.5 Gram(s) IV Push once  glucagon  Injectable 1 milliGRAM(s) IntraMuscular once  insulin lispro (ADMELOG) corrective regimen sliding scale   SubCutaneous three times a day before meals  insulin lispro (ADMELOG) corrective regimen sliding scale   SubCutaneous at bedtime  levothyroxine 25 MICROGram(s) Oral daily  lidocaine   4% Patch 1 Patch Transdermal daily  multivitamin 1 Tablet(s) Oral daily  polyethylene glycol 3350 17 Gram(s) Oral daily  senna 2 Tablet(s) Oral at bedtime  Sodium Chloride 2% 500 milliLiter(s) 500 milliLiter(s) (40 mL/Hr) IV Continuous <Continuous>    MEDICATIONS  (PRN):  dextrose Oral Gel 15 Gram(s) Oral once PRN Blood Glucose LESS THAN 70 milliGRAM(s)/deciliter  guaiFENesin Oral Liquid (Sugar-Free) 100 milliGRAM(s) Oral every 6 hours PRN Cough  oxyCODONE    IR 5 milliGRAM(s) Oral every 8 hours PRN Severe Pain (7 - 10)            REVIEW OF SYSTEMS:  Constitutional: [ ] fever, [ ]weight loss,  [ ]fatigue [ ]weight gain  Eyes: [ ] visual changes  Respiratory: [ ]shortness of breath;  [ ] cough, [ ]wheezing, [ ]chills, [ ]hemoptysis  Cardiovascular: [ ] chest pain, [ ]palpitations, [ ]dizziness,  [ ]leg swelling[ ]orthopnea[ ]PND  Gastrointestinal: [ ] abdominal pain, [ ]nausea, [ ]vomiting,  [ ]diarrhea [ ]Constipation [ ]Melena  Genitourinary: [ ] dysuria, [ ] hematuria [ ]Srivastava  Neurologic: [ ] headaches [ ] tremors[ ]weakness [ ]Paralysis Right[ ] Left[ ]  Skin: [ ] itching, [ ]burning, [ ] rashes  Endocrine: [ ] heat or cold intolerance  Musculoskeletal: [ ] joint pain or swelling; [ ] muscle, back, or extremity pain  Psychiatric: [ ] depression, [ ]anxiety, [ ]mood swings, or [ ]difficulty sleeping  Hematologic: [ ] easy bruising, [ ] bleeding gums    [ ] All remaining systems negative except as per above.   [ ]Unable to obtain.  [x] No change in ROS since admission      Vital Signs Last 24 Hrs  T(C): 36.5 (13 Dec 2023 14:55), Max: 37.2 (12 Dec 2023 21:42)  T(F): 97.7 (13 Dec 2023 14:55), Max: 98.9 (12 Dec 2023 21:42)  HR: 83 (13 Dec 2023 14:55) (82 - 86)  BP: 125/66 (13 Dec 2023 14:55) (120/62 - 126/66)  BP(mean): --  RR: 18 (13 Dec 2023 14:55) (18 - 18)  SpO2: 98% (13 Dec 2023 14:55) (98% - 98%)    Parameters below as of 13 Dec 2023 14:55  Patient On (Oxygen Delivery Method): room air      I&O's Summary    12 Dec 2023 07:01  -  13 Dec 2023 07:00  --------------------------------------------------------  IN: 0 mL / OUT: 550 mL / NET: -550 mL        PHYSICAL EXAM:  General: No acute distress BMI-  HEENT: EOMI, PERRL  Neck: Supple, [ ] JVD  Lungs: Equal air entry bilaterally; [ ] rales [ ] wheezing [ ] rhonchi  Heart: Regular rate and rhythm; [x ] murmur   2/6 [ x] systolic [ ] diastolic [ ] radiation[ ] rubs [ ]  gallops  Abdomen: Nontender, bowel sounds present  Extremities: No clubbing, cyanosis, [ ] edema [ ]Pulses  equal and intact  Nervous system:  Alert & Oriented X3, no focal deficits  Psychiatric: Normal affect  Skin: No rashes or lesions    LABS:  12-13    129<L>  |  96<L>  |  27<H>  ----------------------------<  152<H>  4.5   |  24  |  1.11    Ca    9.0      13 Dec 2023 08:40  Phos  3.3     12-13  Mg     2.10     12-13    TPro  6.0  /  Alb  3.3  /  TBili  0.6  /  DBili  0.2  /  AST  51<H>  /  ALT  55<H>  /  AlkPhos  396<H>  12-13    Creatinine Trend: 1.11<--, 1.21<--, 1.18<--, 1.11<--, 1.18<--                        9.8    5.15  )-----------( 177      ( 13 Dec 2023 08:40 )             28.1     PT/INR - ( 12 Dec 2023 04:49 )   PT: 11.6 sec;   INR: 1.03 ratio

## 2023-12-13 NOTE — CONSULT NOTE ADULT - REASON FOR ADMISSION
Recent fall/ Metastatic prostate cancer with bone involvement pain

## 2023-12-13 NOTE — CHART NOTE - NSCHARTNOTEFT_GEN_A_CORE
83y.o. M with known prior h/o prostate cancer, not on treatment, had prior RT 2021, recent h/o weakness and difficulty ambulating  prompted this admission on 12/8/23.   He was found to have a left displaced acetabular fracture.  Seen by oncology, Dr. Taylor, and also orthopedics.    He is planned for a bone biopsy to r/o metastatic prostate vs. myeloma - procedure planned for 12/14/23.   Rad Onc aware and following.  Ortho and ONcology following.         ortho:   Plan:  - PWB LLE w walker  - FU MRI pelvis, L hip w/wo IV contrast official read  - FU  Bone scan  - FU XR pelvis: Judet, inlet/outlet views  - IR consult for biopsy of pelvic lesion  - Pain control  - PT/OT  - DVT PPX per primary team      < from: CT Pelvis Bony Only No Cont (12.09.23 @ 16:33) >    IMPRESSION:    Lytic lesions throughout the pelvis and sacrum, several of which exhibit   cortical breakthrough. Primary differential considerations include   metastatic disease or multiple myeloma.    Largest lytic lesion is within the left posterior   acetabulum/supra-acetabular region, where there are areas of cortical   breakthrough. There is destruction of the subchondral bone plate along   the superomedial acetabulum with possible extension of tumor into the   left hip joint space. There is a mildly displaced, comminuted pathologic   fracture through this acetabular lytic lesion with fracture involving the   articular surface of the acetabulum.    Other findings as above.    < end of copied text > 83y.o. M with known prior h/o prostate cancer, not on treatment, had prior RT to the prostate to 70gy in 2020 with Dr. Franco, recent h/o weakness and difficulty ambulating  prompted this admission on 12/8/23.   He was found to have a left displaced acetabular fracture.  Seen by oncology, Dr. Taylor, and also orthopedics.    He is planned for a bone biopsy to r/o metastatic prostate vs. myeloma - procedure planned for 12/14/23.   Rad Onc aware and following.  Ortho and ONcology following.         ortho:   Plan:  - PWB LLE w walker  - FU MRI pelvis, L hip w/wo IV contrast official read  - FU  Bone scan  - FU XR pelvis: Judet, inlet/outlet views  - IR consult for biopsy of pelvic lesion  - Pain control  - PT/OT  - DVT PPX per primary team      < from: CT Pelvis Bony Only No Cont (12.09.23 @ 16:33) >    IMPRESSION:    Lytic lesions throughout the pelvis and sacrum, several of which exhibit   cortical breakthrough. Primary differential considerations include   metastatic disease or multiple myeloma.    Largest lytic lesion is within the left posterior   acetabulum/supra-acetabular region, where there are areas of cortical   breakthrough. There is destruction of the subchondral bone plate along   the superomedial acetabulum with possible extension of tumor into the   left hip joint space. There is a mildly displaced, comminuted pathologic   fracture through this acetabular lytic lesion with fracture involving the   articular surface of the acetabulum.    Other findings as above.    < end of copied text > 83y.o. M with known prior h/o prostate cancer, not on treatment, had prior RT to the prostate to 70gy in 2020 with Dr. Franco, recent h/o weakness and difficulty ambulating  prompted this admission on 12/8/23.   He was found to have a left displaced acetabular fracture.  Seen by oncology, Dr. Taylor, and also orthopedics.    He is planned for a bone biopsy to r/o metastatic prostate vs. myeloma - procedure planned for 12/14/23.   Rad Onc aware and following.  Ortho and ONcology following.     When biopsy results available, radiation could be considered as inpatient / outpatient as appropriate, please contact us to discuss.    BREE Obrien, Radiation Oncology        ortho:   Plan:  - PWB LLE w walker  - FU MRI pelvis, L hip w/wo IV contrast official read  - FU  Bone scan  - FU XR pelvis: Judet, inlet/outlet views  - IR consult for biopsy of pelvic lesion  - Pain control  - PT/OT  - DVT PPX per primary team          < from: CT Pelvis Bony Only No Cont (12.09.23 @ 16:33) >    IMPRESSION:    Lytic lesions throughout the pelvis and sacrum, several of which exhibit   cortical breakthrough. Primary differential considerations include   metastatic disease or multiple myeloma.    Largest lytic lesion is within the left posterior   acetabulum/supra-acetabular region, where there are areas of cortical   breakthrough. There is destruction of the subchondral bone plate along   the superomedial acetabulum with possible extension of tumor into the   left hip joint space. There is a mildly displaced, comminuted pathologic   fracture through this acetabular lytic lesion with fracture involving the   articular surface of the acetabulum.    Other findings as above.    < end of copied text >

## 2023-12-13 NOTE — PROGRESS NOTE ADULT - TIME BILLING
- Review of records, telemetry, vital signs and daily labs.   - General and cardiovascular physical examination.  - Generation of cardiovascular treatment plan.  - Coordination of care.      Patient was seen and examined by me on 12/13/23,interim events noted,labs and radiology studies reviewed.  Albert Lockwood MD,FACC.  [address] [place], [place] [id number].  [phone] - Review of records, telemetry, vital signs and daily labs.   - General and cardiovascular physical examination.  - Generation of cardiovascular treatment plan.  - Coordination of care.      Patient was seen and examined by me on 12/13/23,interim events noted,labs and radiology studies reviewed.  Albert Lockwood MD,FACC.  [address] [place], [place] [id number].  [phone]

## 2023-12-13 NOTE — CONSULT NOTE ADULT - CONSULT REASON
Hyponatremia
L pelvis lytic lesion w path fx
Spine mets
83 male past medical history diabetes, prostate cancer not on treatment, hyperlipidemia, hypothyroidism, anemia presenting with left sided pain fornd to have " Acute pathologic mildly displaced left acetabular fracture. Diffuse osteolytic lesions consistent with metastatic disease versus myeloma, largest involving the left ilium" on MRO Hip / Pelvis  IR consult for biopsy of pelvic lesion per Ortho recs
fall

## 2023-12-14 ENCOUNTER — RESULT REVIEW (OUTPATIENT)
Age: 83
End: 2023-12-14

## 2023-12-14 LAB
% ALBUMIN: 48.3 % — SIGNIFICANT CHANGE UP
% ALBUMIN: 48.3 % — SIGNIFICANT CHANGE UP
% ALPHA 1: 6.2 % — SIGNIFICANT CHANGE UP
% ALPHA 1: 6.2 % — SIGNIFICANT CHANGE UP
% ALPHA 2: 17.3 % — SIGNIFICANT CHANGE UP
% ALPHA 2: 17.3 % — SIGNIFICANT CHANGE UP
% BETA: 16.3 % — SIGNIFICANT CHANGE UP
% BETA: 16.3 % — SIGNIFICANT CHANGE UP
% GAMMA: 11.9 % — SIGNIFICANT CHANGE UP
% GAMMA: 11.9 % — SIGNIFICANT CHANGE UP
ALBUMIN SERPL ELPH-MCNC: 2.99 G/DL — LOW (ref 3.3–4.4)
ALBUMIN SERPL ELPH-MCNC: 2.99 G/DL — LOW (ref 3.3–4.4)
ALBUMIN SERPL ELPH-MCNC: 3.4 G/DL — SIGNIFICANT CHANGE UP (ref 3.3–5)
ALBUMIN SERPL ELPH-MCNC: 3.4 G/DL — SIGNIFICANT CHANGE UP (ref 3.3–5)
ALBUMIN/GLOB SERPL ELPH: 0.9 RATIO — SIGNIFICANT CHANGE UP
ALBUMIN/GLOB SERPL ELPH: 0.9 RATIO — SIGNIFICANT CHANGE UP
ALP SERPL-CCNC: 396 U/L — HIGH (ref 40–120)
ALP SERPL-CCNC: 396 U/L — HIGH (ref 40–120)
ALPHA1 GLOB SERPL ELPH-MCNC: 0.38 G/DL — HIGH (ref 0.1–0.3)
ALPHA1 GLOB SERPL ELPH-MCNC: 0.38 G/DL — HIGH (ref 0.1–0.3)
ALPHA2 GLOB SERPL ELPH-MCNC: 1.1 G/DL — HIGH (ref 0.6–1)
ALPHA2 GLOB SERPL ELPH-MCNC: 1.1 G/DL — HIGH (ref 0.6–1)
ALT FLD-CCNC: 44 U/L — HIGH (ref 4–41)
ALT FLD-CCNC: 44 U/L — HIGH (ref 4–41)
ANION GAP SERPL CALC-SCNC: 11 MMOL/L — SIGNIFICANT CHANGE UP (ref 7–14)
ANION GAP SERPL CALC-SCNC: 11 MMOL/L — SIGNIFICANT CHANGE UP (ref 7–14)
AST SERPL-CCNC: 34 U/L — SIGNIFICANT CHANGE UP (ref 4–40)
AST SERPL-CCNC: 34 U/L — SIGNIFICANT CHANGE UP (ref 4–40)
B-GLOBULIN SERPL ELPH-MCNC: 1.01 G/DL — SIGNIFICANT CHANGE UP (ref 0.6–1.1)
B-GLOBULIN SERPL ELPH-MCNC: 1.01 G/DL — SIGNIFICANT CHANGE UP (ref 0.6–1.1)
BILIRUB DIRECT SERPL-MCNC: <0.2 MG/DL — SIGNIFICANT CHANGE UP (ref 0–0.3)
BILIRUB DIRECT SERPL-MCNC: <0.2 MG/DL — SIGNIFICANT CHANGE UP (ref 0–0.3)
BILIRUB INDIRECT FLD-MCNC: >0.4 MG/DL — SIGNIFICANT CHANGE UP (ref 0–1)
BILIRUB INDIRECT FLD-MCNC: >0.4 MG/DL — SIGNIFICANT CHANGE UP (ref 0–1)
BILIRUB SERPL-MCNC: 0.6 MG/DL — SIGNIFICANT CHANGE UP (ref 0.2–1.2)
BILIRUB SERPL-MCNC: 0.6 MG/DL — SIGNIFICANT CHANGE UP (ref 0.2–1.2)
BUN SERPL-MCNC: 24 MG/DL — HIGH (ref 7–23)
BUN SERPL-MCNC: 24 MG/DL — HIGH (ref 7–23)
CALCIUM SERPL-MCNC: 8.9 MG/DL — SIGNIFICANT CHANGE UP (ref 8.4–10.5)
CALCIUM SERPL-MCNC: 8.9 MG/DL — SIGNIFICANT CHANGE UP (ref 8.4–10.5)
CHLORIDE SERPL-SCNC: 99 MMOL/L — SIGNIFICANT CHANGE UP (ref 98–107)
CHLORIDE SERPL-SCNC: 99 MMOL/L — SIGNIFICANT CHANGE UP (ref 98–107)
CO2 SERPL-SCNC: 22 MMOL/L — SIGNIFICANT CHANGE UP (ref 22–31)
CO2 SERPL-SCNC: 22 MMOL/L — SIGNIFICANT CHANGE UP (ref 22–31)
CREAT SERPL-MCNC: 1.01 MG/DL — SIGNIFICANT CHANGE UP (ref 0.5–1.3)
CREAT SERPL-MCNC: 1.01 MG/DL — SIGNIFICANT CHANGE UP (ref 0.5–1.3)
EGFR: 74 ML/MIN/1.73M2 — SIGNIFICANT CHANGE UP
EGFR: 74 ML/MIN/1.73M2 — SIGNIFICANT CHANGE UP
GAMMA GLOBULIN: 0.74 G/DL — SIGNIFICANT CHANGE UP (ref 0.7–1.7)
GAMMA GLOBULIN: 0.74 G/DL — SIGNIFICANT CHANGE UP (ref 0.7–1.7)
GLUCOSE BLDC GLUCOMTR-MCNC: 142 MG/DL — HIGH (ref 70–99)
GLUCOSE BLDC GLUCOMTR-MCNC: 142 MG/DL — HIGH (ref 70–99)
GLUCOSE BLDC GLUCOMTR-MCNC: 146 MG/DL — HIGH (ref 70–99)
GLUCOSE BLDC GLUCOMTR-MCNC: 146 MG/DL — HIGH (ref 70–99)
GLUCOSE BLDC GLUCOMTR-MCNC: 148 MG/DL — HIGH (ref 70–99)
GLUCOSE BLDC GLUCOMTR-MCNC: 148 MG/DL — HIGH (ref 70–99)
GLUCOSE BLDC GLUCOMTR-MCNC: 152 MG/DL — HIGH (ref 70–99)
GLUCOSE BLDC GLUCOMTR-MCNC: 152 MG/DL — HIGH (ref 70–99)
GLUCOSE BLDC GLUCOMTR-MCNC: 154 MG/DL — HIGH (ref 70–99)
GLUCOSE BLDC GLUCOMTR-MCNC: 154 MG/DL — HIGH (ref 70–99)
GLUCOSE BLDC GLUCOMTR-MCNC: 280 MG/DL — HIGH (ref 70–99)
GLUCOSE BLDC GLUCOMTR-MCNC: 280 MG/DL — HIGH (ref 70–99)
GLUCOSE SERPL-MCNC: 137 MG/DL — HIGH (ref 70–99)
GLUCOSE SERPL-MCNC: 137 MG/DL — HIGH (ref 70–99)
HCT VFR BLD CALC: 29.6 % — LOW (ref 39–50)
HCT VFR BLD CALC: 29.6 % — LOW (ref 39–50)
HGB BLD-MCNC: 10.4 G/DL — LOW (ref 13–17)
HGB BLD-MCNC: 10.4 G/DL — LOW (ref 13–17)
INR BLD: 0.97 RATIO — SIGNIFICANT CHANGE UP (ref 0.85–1.18)
INR BLD: 0.97 RATIO — SIGNIFICANT CHANGE UP (ref 0.85–1.18)
MAGNESIUM SERPL-MCNC: 2 MG/DL — SIGNIFICANT CHANGE UP (ref 1.6–2.6)
MAGNESIUM SERPL-MCNC: 2 MG/DL — SIGNIFICANT CHANGE UP (ref 1.6–2.6)
MCHC RBC-ENTMCNC: 30.1 PG — SIGNIFICANT CHANGE UP (ref 27–34)
MCHC RBC-ENTMCNC: 30.1 PG — SIGNIFICANT CHANGE UP (ref 27–34)
MCHC RBC-ENTMCNC: 35.1 GM/DL — SIGNIFICANT CHANGE UP (ref 32–36)
MCHC RBC-ENTMCNC: 35.1 GM/DL — SIGNIFICANT CHANGE UP (ref 32–36)
MCV RBC AUTO: 85.8 FL — SIGNIFICANT CHANGE UP (ref 80–100)
MCV RBC AUTO: 85.8 FL — SIGNIFICANT CHANGE UP (ref 80–100)
NRBC # BLD: 0 /100 WBCS — SIGNIFICANT CHANGE UP (ref 0–0)
NRBC # BLD: 0 /100 WBCS — SIGNIFICANT CHANGE UP (ref 0–0)
NRBC # FLD: 0 K/UL — SIGNIFICANT CHANGE UP (ref 0–0)
NRBC # FLD: 0 K/UL — SIGNIFICANT CHANGE UP (ref 0–0)
PHOSPHATE SERPL-MCNC: 2.9 MG/DL — SIGNIFICANT CHANGE UP (ref 2.5–4.5)
PHOSPHATE SERPL-MCNC: 2.9 MG/DL — SIGNIFICANT CHANGE UP (ref 2.5–4.5)
PLATELET # BLD AUTO: 222 K/UL — SIGNIFICANT CHANGE UP (ref 150–400)
PLATELET # BLD AUTO: 222 K/UL — SIGNIFICANT CHANGE UP (ref 150–400)
POTASSIUM SERPL-MCNC: 4.4 MMOL/L — SIGNIFICANT CHANGE UP (ref 3.5–5.3)
POTASSIUM SERPL-MCNC: 4.4 MMOL/L — SIGNIFICANT CHANGE UP (ref 3.5–5.3)
POTASSIUM SERPL-SCNC: 4.4 MMOL/L — SIGNIFICANT CHANGE UP (ref 3.5–5.3)
POTASSIUM SERPL-SCNC: 4.4 MMOL/L — SIGNIFICANT CHANGE UP (ref 3.5–5.3)
PROT PATTERN SERPL ELPH-IMP: SIGNIFICANT CHANGE UP
PROT PATTERN SERPL ELPH-IMP: SIGNIFICANT CHANGE UP
PROT SERPL-MCNC: 6 G/DL — SIGNIFICANT CHANGE UP (ref 6–8.3)
PROT SERPL-MCNC: 6 G/DL — SIGNIFICANT CHANGE UP (ref 6–8.3)
PROT SERPL-MCNC: 6.2 G/DL — SIGNIFICANT CHANGE UP
PROT SERPL-MCNC: 6.2 G/DL — SIGNIFICANT CHANGE UP
PROTHROM AB SERPL-ACNC: 11 SEC — SIGNIFICANT CHANGE UP (ref 9.5–13)
PROTHROM AB SERPL-ACNC: 11 SEC — SIGNIFICANT CHANGE UP (ref 9.5–13)
RBC # BLD: 3.45 M/UL — LOW (ref 4.2–5.8)
RBC # BLD: 3.45 M/UL — LOW (ref 4.2–5.8)
RBC # FLD: 13.3 % — SIGNIFICANT CHANGE UP (ref 10.3–14.5)
RBC # FLD: 13.3 % — SIGNIFICANT CHANGE UP (ref 10.3–14.5)
SODIUM SERPL-SCNC: 132 MMOL/L — LOW (ref 135–145)
SODIUM SERPL-SCNC: 132 MMOL/L — LOW (ref 135–145)
WBC # BLD: 6.09 K/UL — SIGNIFICANT CHANGE UP (ref 3.8–10.5)
WBC # BLD: 6.09 K/UL — SIGNIFICANT CHANGE UP (ref 3.8–10.5)
WBC # FLD AUTO: 6.09 K/UL — SIGNIFICANT CHANGE UP (ref 3.8–10.5)
WBC # FLD AUTO: 6.09 K/UL — SIGNIFICANT CHANGE UP (ref 3.8–10.5)

## 2023-12-14 PROCEDURE — 88333 PATH CONSLTJ SURG CYTO XM 1: CPT | Mod: 26

## 2023-12-14 PROCEDURE — 88307 TISSUE EXAM BY PATHOLOGIST: CPT | Mod: 26

## 2023-12-14 PROCEDURE — 20225 BONE BIOPSY TROCAR/NDL DEEP: CPT

## 2023-12-14 PROCEDURE — 77012 CT SCAN FOR NEEDLE BIOPSY: CPT | Mod: 26

## 2023-12-14 PROCEDURE — 88342 IMHCHEM/IMCYTCHM 1ST ANTB: CPT | Mod: 26

## 2023-12-14 PROCEDURE — 88341 IMHCHEM/IMCYTCHM EA ADD ANTB: CPT | Mod: 26

## 2023-12-14 RX ORDER — INSULIN LISPRO 100/ML
VIAL (ML) SUBCUTANEOUS AT BEDTIME
Refills: 0 | Status: DISCONTINUED | OUTPATIENT
Start: 2023-12-14 | End: 2023-12-15

## 2023-12-14 RX ORDER — OXYCODONE HYDROCHLORIDE 5 MG/1
5 TABLET ORAL EVERY 8 HOURS
Refills: 0 | Status: DISCONTINUED | OUTPATIENT
Start: 2023-12-14 | End: 2023-12-15

## 2023-12-14 RX ORDER — INSULIN LISPRO 100/ML
VIAL (ML) SUBCUTANEOUS
Refills: 0 | Status: DISCONTINUED | OUTPATIENT
Start: 2023-12-14 | End: 2023-12-15

## 2023-12-14 RX ORDER — INSULIN LISPRO 100/ML
VIAL (ML) SUBCUTANEOUS EVERY 6 HOURS
Refills: 0 | Status: DISCONTINUED | OUTPATIENT
Start: 2023-12-14 | End: 2023-12-14

## 2023-12-14 RX ADMIN — Medication 3: at 17:54

## 2023-12-14 RX ADMIN — Medication 25 MICROGRAM(S): at 06:16

## 2023-12-14 RX ADMIN — Medication 650 MILLIGRAM(S): at 17:55

## 2023-12-14 RX ADMIN — ATORVASTATIN CALCIUM 10 MILLIGRAM(S): 80 TABLET, FILM COATED ORAL at 21:03

## 2023-12-14 RX ADMIN — Medication 650 MILLIGRAM(S): at 06:16

## 2023-12-14 NOTE — PROGRESS NOTE ADULT - ASSESSMENT
Mr. Lucas Cordova is a 83 year old man with past medical history of diabetes mellitus, hyperlipidemia, hypothyroidism, anemia,  prostate cancer s/p radiation who presents for present presents for bone pain for over week, found with bone lesion likely from metastatic prostate cancer, pending MRI and PT assessment       d/c planing

## 2023-12-14 NOTE — PROGRESS NOTE ADULT - PROBLEM SELECTOR PLAN 3
- Likely anemia of chronic disease.   - Labs ordered to further assess.   - C/w multivitamin

## 2023-12-14 NOTE — CHART NOTE - NSCHARTNOTESELECT_GEN_ALL_CORE
ACP NP pre-IR note/Event Note
Oncology/Event Note
ACP NP note/Event Note
Event Note
rad onc/Event Note

## 2023-12-14 NOTE — PROGRESS NOTE ADULT - ASSESSMENT
83y Male with history of prostate CA presents with inability to ambulate found to have L acetabular fracture. Nephrology consulted for hyponatremia.    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 Hyponatremia improving s/p HTS. Continue with 1L FR and Glucerna with meals. Monitor serum Na.    2) CKD-3a: Silver Scr appears to be 1-1.2 and likely baseline. CT reviewed. Outpatient CKD work up. Avoid nephrotoxins. Monitor electrolytes.    3) HTN with CKD: BP controlled. Monitor off medications.    4) Prostate CA: Patient will need Urology and Onc evaluation.      [facility] NEPHROLOGY  Braulio Jeff M.D.  Vinicius Greenberg D.O.  Jyoti Michelle M.D.  MD Zaynab Moon, MSN, ANP-C    Telephone: [phone]  Facsimile: [phone]    [address] [place] [address], [address]  [place] [address]         83y Male with history of prostate CA presents with inability to ambulate found to have L acetabular fracture. Nephrology consulted for hyponatremia.    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 Hyponatremia improving s/p HTS. Continue with 1L FR and Glucerna with meals. Monitor serum Na.    2) CKD-3a: Silver Scr appears to be 1-1.2 and likely baseline. CT reviewed. Outpatient CKD work up. Avoid nephrotoxins. Monitor electrolytes.    3) HTN with CKD: BP controlled. Monitor off medications.    4) Prostate CA: Patient will need Urology and Onc evaluation.      [facility] NEPHROLOGY  Braulio Jeff M.D.  Vinicius Greenberg D.O.  Jyoti Michelle M.D.  MD Zaynab Moon, MSN, ANP-C    Telephone: [phone]  Facsimile: [phone]    [address] [place] [address], [address]  [place] [address]

## 2023-12-14 NOTE — PROGRESS NOTE ADULT - PROBLEM SELECTOR PLAN 2
- Possibly OA  - Xray of the humerus, knee, femur, tibia, shoulder, pelvis without fracture. Showing degenerative changes.   - Likely has rotator cuff pathology on exam. PT ordered for management. Recommended outpatient follow up

## 2023-12-14 NOTE — PROGRESS NOTE ADULT - PROBLEM SELECTOR PLAN 6
- has not been taking levothyroxine, restarted at 25mcg daily.   - tsh ordered for the morning.

## 2023-12-14 NOTE — PROGRESS NOTE ADULT - TIME BILLING
Patient Education     Controlling Your Cholesterol  Cholesterol is a waxy substance. It travels in your blood through the blood vessels. When you have high cholesterol, it can build up along the walls of the blood vessels. This makes the vessels narrower and decreases blood flow. You are then at greater risk of having a heart attack or a stroke.  Good and bad cholesterol  Lipids are fats, and blood is mostly water. Fat and water don't mix. So our bodies need lipoproteins (lipids inside a protein shell) to carry the lipids. The protein shell carries its lipids through the bloodstream. There are two main kinds of lipoproteins:  · LDL (low-density lipoprotein) is known as \"bad cholesterol.\" It mainly carries cholesterol. It delivers this cholesterol to body cells. Excess LDL cholesterol will build up in artery walls. This increases your risk for heart disease and stroke.  · HDL (high-density lipoprotein) is known as \"good cholesterol.\" This protein shell collects excess cholesterol that LDLs have left behind on blood vessel walls. That's why high levels of HDL cholesterol can decrease your risk of heart disease and stroke.  Controlling cholesterol levels  Total cholesterol includes LDL and HDL cholesterol, as well as other fats in the bloodstream. If your total cholesterol is high, follow the steps below to help lower your total cholesterol level:  Eat less unhealthy fat  · Cut back on saturated fats and trans fats (also called hydrogenated) by selecting lean cuts of meat, low-fat dairy, and using oils instead of solid fats. Limit baked goods, processed meats, and fried foods. A diet that’s high in these fats increases your bad cholesterol. It's not enough to just cut back on foods containing cholesterol.  · Eat about 2 servings of fish per week. Most fish contain omega-3 fatty acids. These help lower blood cholesterol.  · Eat more whole grains and soluble fiber (such as oat bran). These lower overall cholesterol.  Be  active  · Choose an activity you enjoy. Walking, swimming, and riding a bike are some good ways to be active.  · Start at a level where you feel comfortable. Increase your time and pace a little each week.  · Work up to 30 to 40 minutes of moderate to high intensity physical activity at least 3 to 4 days per week.  · Remember, some activity is better than none.  · If you haven't been exercising regularly, start slowly. Check with your healthcare provider to make sure the exercise plan is right for you.  Quit smoking  Quitting smoking can improve your lipid levels. It also lowers your risk for heart disease and stroke.  Manage your weight  If you are overweight or obese, your healthcare provider will work with you to lose weight and lower your BMI (body mass index) to a normal or near-normal level. Making diet changes and increasing physical activity can help.  Take medicine as directed  Many people need medicine to get their LDL levels to a safe level. Medicine to lower cholesterol levels is effective and safe. Taking medicine is not a substitute for exercise or watching your diet! Your healthcare provider can tell you whether you might benefit from a cholesterol-lowering medicine.  Date Last Reviewed: 6/1/2017  © 8203-2637 [employer]. [address] [place] [address], [place] [address]. All rights reserved. This information is not intended as a substitute for professional medical care. Always follow your healthcare professional's instructions.            - Review of records, telemetry, vital signs and daily labs.   - General and cardiovascular physical examination.  - Generation of cardiovascular treatment plan.  - Coordination of care.      Patient was seen and examined by me on 12/14/23,interim events noted,labs and radiology studies reviewed.  Albert Lockwood MD,FACC.  [address] [place], [place] [id number].  [phone] - Review of records, telemetry, vital signs and daily labs.   - General and cardiovascular physical examination.  - Generation of cardiovascular treatment plan.  - Coordination of care.      Patient was seen and examined by me on 12/14/23,interim events noted,labs and radiology studies reviewed.  Albert Lockwood MD,FACC.  [address] [place], [place] [id number].  [phone]

## 2023-12-14 NOTE — PROGRESS NOTE ADULT - PROBLEM SELECTOR PLAN 4
- Holding metformin 500mg BID and farxiga at home.   - Insulin therapy while in the hospital.  - A1c in the am.

## 2023-12-14 NOTE — PROGRESS NOTE ADULT - PROBLEM SELECTOR PLAN 1
Prostate CA w/ mets to bone  - per Oncology -plan to start Bicalutamide after PETscan as outpatient  - PT -Outpatient PT (pt lives with son, family involved)    # Fall -left sided pain   - MRI T/L spine -Diffuse osseous metastases  - Ortho recs - PWB LLE w walker  - Bone scan  - IR consulted for biopsy of pelvic lesion will be done on 12/14 per IR  - Hold Lovenox for procedure
- Saw radiation oncologist and oncologist. PSA level of ~1000 on december 5th. Alk phos elevated likely to bone involvement.   - CT scan showing Nonspecific multifocal lytic lesions involving the thoracic vertebrae are nonspecific. The largest involve the T10 and T12 vertebral bodies. MRI of the spine recommended   - MRI of cervical, thoracic and lumbar spine ordered.   - Discussed with Oncology fellow on 12/9, start Bicalutamide after PETscan.   - Nocturia x 3 a night. See urologist, unclear if taking medication. Needs follow up.    - Given thoracic lytic lesion, starting acetaminophen BID for pain. Lidocaine patch prn thoracic back pain. oxycodone 5mg daily as needed for pain. Miralax prn     Onc recs aprpeciated, orthopedics consult.
Prostate CA w/ mets to bone  - per Oncology -plan to start Bicalutamide after PETscan as outpatient  - PT -Outpatient PT (pt lives with son, family involved)    # Fall -left sided pain   - MRI T/L spine -Diffuse osseous metastases  - Ortho recs - PWB LLE w walker  - Bone scan  - IR consulted for biopsy of pelvic lesion will be done on 12/14 per IR  - Hold Lovenox for procedure

## 2023-12-14 NOTE — PROGRESS NOTE ADULT - PROBLEM SELECTOR PLAN 9
- DVT prophylaxis - Lovenox daily   - Aspiration, fall precautions,  - PT ordered. Has walker and cane at home   - Called [facility] pharmacy, med rec done. He has not been adherent to medication as per daughter. Discharge med rec needed.     Consider outpatient follow up at [facility] office at [address] [place] [address]. Phone [phone]. - DVT prophylaxis - Lovenox daily   - Aspiration, fall precautions,  - PT ordered. Has walker and cane at home   - Called [facility] pharmacy, med rec done. He has not been adherent to medication as per daughter. Discharge med rec needed.     Consider outpatient follow up at [facility] office at [address] [place] [address]. Phone [phone].

## 2023-12-14 NOTE — PROGRESS NOTE ADULT - PROBLEM SELECTOR PROBLEM 8
Vertebral compression fracture

## 2023-12-14 NOTE — PROGRESS NOTE ADULT - SUBJECTIVE AND OBJECTIVE BOX
MR#[id number]  PATIENT NAME:-MARCY GONZALEZ    DATE OF SERVICE: 12-14-23   Patient was seen and examined by Albert Lockwood MD on    12-14-23  Interim events noted.Consultant notes ,Labs,Telemetry reviewed by [facility] COURSE: HPI:  Interventional Radiology  Pre-Procedure Note    This is a 83y  Male  presenting for pelvic bipsy    HPI:  Mr. Lucas Cordova is a 83 year old man with past medical history of diabetes mellitus, hyperlipidemia, hypothyroidism, anemia,  prostate cancer s/p radiation who presents for present presents for multiple joint pain for over week. He had a fall over 2 days ago because of weakness. He landed on his left side. Denied head trauma or lost of consciousness. H Pain is severe. Inability to perform his activity of daily living. He saw oncologist Dr. Taylor and was sent to the ER to the ER.     In the ER, initial vital signs were /55, HR 93, T 98.1 Saturating 100% on room air.  Patient endorses multiple area of pain. Mid back pain. Left arm and knee pain. He has decreased range of motion in left shoulder joint.     Of note, he left medications in [facility]. Has not been taking thyroid medications and others. Only metfomrin and fish oils as per daughter.     Of note, patient is scheduled for a PET scan outpatient.  (09 Dec 2023 09:08)      PAST MEDICAL & SURGICAL HISTORY:  Prostate cancer      Hyperlipidemia      Diabetes mellitus      Hypothyroidism      H/O prostate biopsy          Social History:     FAMILY HISTORY:  No pertinent family history in first degree relatives        Allergies: No Known Allergies      Current Medications: acetaminophen     Tablet .. 650 milliGRAM(s) Oral every 12 hours  atorvastatin 10 milliGRAM(s) Oral at bedtime  dextrose 5%. 1000 milliLiter(s) IV Continuous <Continuous>  dextrose 5%. 1000 milliLiter(s) IV Continuous <Continuous>  dextrose 50% Injectable 25 Gram(s) IV Push once  dextrose 50% Injectable 12.5 Gram(s) IV Push once  dextrose 50% Injectable 25 Gram(s) IV Push once  dextrose Oral Gel 15 Gram(s) Oral once PRN  glucagon  Injectable 1 milliGRAM(s) IntraMuscular once  guaiFENesin Oral Liquid (Sugar-Free) 100 milliGRAM(s) Oral every 6 hours PRN  insulin lispro (ADMELOG) corrective regimen sliding scale   SubCutaneous every 6 hours  levothyroxine 25 MICROGram(s) Oral daily  lidocaine   4% Patch 1 Patch Transdermal daily  multivitamin 1 Tablet(s) Oral daily  oxyCODONE    IR 5 milliGRAM(s) Oral every 8 hours PRN  polyethylene glycol 3350 17 Gram(s) Oral daily  senna 2 Tablet(s) Oral at bedtime  Sodium Chloride 2% 500 milliLiter(s) 500 milliLiter(s) IV Continuous <Continuous>      Labs:                         10.4   6.09  )-----------( 222      ( 14 Dec 2023 05:35 )             29.6       12-14    132<L>  |  99  |  24<H>  ----------------------------<  137<H>  4.4   |  22  |  1.01    Ca    8.9      14 Dec 2023 05:35  Phos  2.9     12-14  Mg     2.00     12-14    TPro  6.0  /  Alb  3.4  /  TBili  0.6  /  DBili  <0.2  /  AST  34  /  ALT  44<H>  /  AlkPhos  396<H>  12-14      Radiology:   < from: MR Pelvis Bony Only w/wo IV Cont (12.10.23 @ 23:01) >  IMPRESSION:    1.  Acute pathologic mildly displaced left acetabular fracture.  2.  Diffuse osteolytic lesions consistent with metastatic disease versus   myeloma, largest involving the left ilium.  3.  Scattered grade 1/2 strains of left hip girdle musculature. Left   greater trochanteric bursitis, likely hemorrhagic.    --- End of Report ---        < end of copied text >        < from: CT Abdomen and Pelvis w/ Oral Cont and w/ IV Cont (12.10.23 @ 12:21) >    IMPRESSION:  No acute pleural or parenchymal abnormality. Nonspecific 4 mm right   middle lobe nodule.  No acute abnormal finding or visceral injury.  Lytic osseous lesion involving left acetabulum, sacrum, and thoracic spine        --- End of Report ---    < end of copied text >      Assessment/Plan:   This is a 83y Male  presents with left acetabular lytic lesion with pathologic fracutre  Patient presents to IR for CT guided biopsy. Discussed with Dr Rand, Orthopedic surgery Ok to proceed with left acetabular lesion biopsy per there plane.  Procedure/ risks/ benefits/ goals/ alternatives were explained. All questions answered. Informed content obtained from patient. Consent placed in chart.      (14 Dec 2023 12:05)      INTERIM EVENTS:Patient seen at bedside ,interim events noted.      PMH -reviewed admission note, no change since admission  HEART FAILURE: Acute[ ]Chronic[ ] Systolic[ ] Diastolic[ ] Combined Systolic and Diastolic[ ]  CAD[ ] CABG[ ] PCI[ ]  DEVICES[ ] PPM[ ] ICD[ ] ILR[ ]  ATRIAL FIBRILLATION[ ] Paroxysmal[ ] Permanent[ ] CHADS2-[  ]  RIGOBERTO[ ] CKD1[ ] CKD2[ ] CKD3[ ] CKD4[ ] ESRD[ ]  COPD[ ] HTN[ ]   DM[ ] Type1[ ] Type 2[ ]   CVA[ ] Paresis[ ]    AMBULATION: Assisted[ ] Cane/walker[ ] Independent[ ]    MEDICATIONS  (STANDING):  acetaminophen     Tablet .. 650 milliGRAM(s) Oral every 12 hours  atorvastatin 10 milliGRAM(s) Oral at bedtime  dextrose 5%. 1000 milliLiter(s) (50 mL/Hr) IV Continuous <Continuous>  dextrose 5%. 1000 milliLiter(s) (100 mL/Hr) IV Continuous <Continuous>  dextrose 50% Injectable 25 Gram(s) IV Push once  dextrose 50% Injectable 12.5 Gram(s) IV Push once  dextrose 50% Injectable 25 Gram(s) IV Push once  glucagon  Injectable 1 milliGRAM(s) IntraMuscular once  insulin lispro (ADMELOG) corrective regimen sliding scale   SubCutaneous three times a day before meals  insulin lispro (ADMELOG) corrective regimen sliding scale   SubCutaneous at bedtime  levothyroxine 25 MICROGram(s) Oral daily  lidocaine   4% Patch 1 Patch Transdermal daily  multivitamin 1 Tablet(s) Oral daily  polyethylene glycol 3350 17 Gram(s) Oral daily  senna 2 Tablet(s) Oral at bedtime  Sodium Chloride 2% 500 milliLiter(s) 500 milliLiter(s) (40 mL/Hr) IV Continuous <Continuous>    MEDICATIONS  (PRN):  dextrose Oral Gel 15 Gram(s) Oral once PRN Blood Glucose LESS THAN 70 milliGRAM(s)/deciliter  guaiFENesin Oral Liquid (Sugar-Free) 100 milliGRAM(s) Oral every 6 hours PRN Cough  oxyCODONE    IR 5 milliGRAM(s) Oral every 8 hours PRN Severe Pain (7 - 10)            REVIEW OF SYSTEMS:  Constitutional: [ ] fever, [ ]weight loss,  [ ]fatigue [ ]weight gain  Eyes: [ ] visual changes  Respiratory: [ ]shortness of breath;  [ ] cough, [ ]wheezing, [ ]chills, [ ]hemoptysis  Cardiovascular: [ ] chest pain, [ ]palpitations, [ ]dizziness,  [ ]leg swelling[ ]orthopnea[ ]PND  Gastrointestinal: [ ] abdominal pain, [ ]nausea, [ ]vomiting,  [ ]diarrhea [ ]Constipation [ ]Melena  Genitourinary: [ ] dysuria, [ ] hematuria [ ]Srivastava  Neurologic: [ ] headaches [ ] tremors[ ]weakness [ ]Paralysis Right[ ] Left[ ]  Skin: [ ] itching, [ ]burning, [ ] rashes  Endocrine: [ ] heat or cold intolerance  Musculoskeletal: [ ] joint pain or swelling; [ ] muscle, back, or extremity pain  Psychiatric: [ ] depression, [ ]anxiety, [ ]mood swings, or [ ]difficulty sleeping  Hematologic: [ ] easy bruising, [ ] bleeding gums    [ ] All remaining systems negative except as per above.   [ ]Unable to obtain.  [x] No change in ROS since admission      Vital Signs Last 24 Hrs  T(C): 36.8 (14 Dec 2023 15:44), Max: 37.1 (14 Dec 2023 06:33)  T(F): 98.2 (14 Dec 2023 15:44), Max: 98.8 (14 Dec 2023 06:33)  HR: 85 (14 Dec 2023 15:44) (77 - 96)  BP: 117/56 (14 Dec 2023 15:44) (112/64 - 123/61)  BP(mean): --  RR: 17 (14 Dec 2023 15:44) (17 - 18)  SpO2: 100% (14 Dec 2023 15:44) (97% - 100%)    Parameters below as of 14 Dec 2023 15:44  Patient On (Oxygen Delivery Method): room air      I&O's Summary      PHYSICAL EXAM:  General: No acute distress BMI-  HEENT: EOMI, PERRL  Neck: Supple, [ ] JVD  Lungs: Equal air entry bilaterally; [ ] rales [ ] wheezing [ ] rhonchi  Heart: Regular rate and rhythm; [x ] murmur   2/6 [ x] systolic [ ] diastolic [ ] radiation[ ] rubs [ ]  gallops  Abdomen: Nontender, bowel sounds present  Extremities: No clubbing, cyanosis, [ ] edema [ ]Pulses  equal and intact  Nervous system:  Alert & Oriented X3, no focal deficits  Psychiatric: Normal affect  Skin: No rashes or lesions    LABS:  12-14    132<L>  |  99  |  24<H>  ----------------------------<  137<H>  4.4   |  22  |  1.01    Ca    8.9      14 Dec 2023 05:35  Phos  2.9     12-14  Mg     2.00     12-14    TPro  6.0  /  Alb  3.4  /  TBili  0.6  /  DBili  <0.2  /  AST  34  /  ALT  44<H>  /  AlkPhos  396<H>  12-14    Creatinine Trend: 1.01<--, 1.11<--, 1.21<--, 1.18<--, 1.11<--, 1.18<--                        10.4   6.09  )-----------( 222      ( 14 Dec 2023 05:35 )             29.6     PT/INR - ( 14 Dec 2023 05:35 )   PT: 11.0 sec;   INR: 0.97 ratio                    MR#[id number]  PATIENT NAME:-MARCY GONZALEZ    DATE OF SERVICE: 12-14-23   Patient was seen and examined by Albert Lockwood MD on    12-14-23  Interim events noted.Consultant notes ,Labs,Telemetry reviewed by [facility] COURSE: HPI:  Interventional Radiology  Pre-Procedure Note    This is a 83y  Male  presenting for pelvic bipsy    HPI:  Mr. Lucas Cordova is a 83 year old man with past medical history of diabetes mellitus, hyperlipidemia, hypothyroidism, anemia,  prostate cancer s/p radiation who presents for present presents for multiple joint pain for over week. He had a fall over 2 days ago because of weakness. He landed on his left side. Denied head trauma or lost of consciousness. H Pain is severe. Inability to perform his activity of daily living. He saw oncologist Dr. Taylor and was sent to the ER to the ER.     In the ER, initial vital signs were /55, HR 93, T 98.1 Saturating 100% on room air.  Patient endorses multiple area of pain. Mid back pain. Left arm and knee pain. He has decreased range of motion in left shoulder joint.     Of note, he left medications in [facility]. Has not been taking thyroid medications and others. Only metfomrin and fish oils as per daughter.     Of note, patient is scheduled for a PET scan outpatient.  (09 Dec 2023 09:08)      PAST MEDICAL & SURGICAL HISTORY:  Prostate cancer      Hyperlipidemia      Diabetes mellitus      Hypothyroidism      H/O prostate biopsy          Social History:     FAMILY HISTORY:  No pertinent family history in first degree relatives        Allergies: No Known Allergies      Current Medications: acetaminophen     Tablet .. 650 milliGRAM(s) Oral every 12 hours  atorvastatin 10 milliGRAM(s) Oral at bedtime  dextrose 5%. 1000 milliLiter(s) IV Continuous <Continuous>  dextrose 5%. 1000 milliLiter(s) IV Continuous <Continuous>  dextrose 50% Injectable 25 Gram(s) IV Push once  dextrose 50% Injectable 12.5 Gram(s) IV Push once  dextrose 50% Injectable 25 Gram(s) IV Push once  dextrose Oral Gel 15 Gram(s) Oral once PRN  glucagon  Injectable 1 milliGRAM(s) IntraMuscular once  guaiFENesin Oral Liquid (Sugar-Free) 100 milliGRAM(s) Oral every 6 hours PRN  insulin lispro (ADMELOG) corrective regimen sliding scale   SubCutaneous every 6 hours  levothyroxine 25 MICROGram(s) Oral daily  lidocaine   4% Patch 1 Patch Transdermal daily  multivitamin 1 Tablet(s) Oral daily  oxyCODONE    IR 5 milliGRAM(s) Oral every 8 hours PRN  polyethylene glycol 3350 17 Gram(s) Oral daily  senna 2 Tablet(s) Oral at bedtime  Sodium Chloride 2% 500 milliLiter(s) 500 milliLiter(s) IV Continuous <Continuous>      Labs:                         10.4   6.09  )-----------( 222      ( 14 Dec 2023 05:35 )             29.6       12-14    132<L>  |  99  |  24<H>  ----------------------------<  137<H>  4.4   |  22  |  1.01    Ca    8.9      14 Dec 2023 05:35  Phos  2.9     12-14  Mg     2.00     12-14    TPro  6.0  /  Alb  3.4  /  TBili  0.6  /  DBili  <0.2  /  AST  34  /  ALT  44<H>  /  AlkPhos  396<H>  12-14      Radiology:   < from: MR Pelvis Bony Only w/wo IV Cont (12.10.23 @ 23:01) >  IMPRESSION:    1.  Acute pathologic mildly displaced left acetabular fracture.  2.  Diffuse osteolytic lesions consistent with metastatic disease versus   myeloma, largest involving the left ilium.  3.  Scattered grade 1/2 strains of left hip girdle musculature. Left   greater trochanteric bursitis, likely hemorrhagic.    --- End of Report ---        < end of copied text >        < from: CT Abdomen and Pelvis w/ Oral Cont and w/ IV Cont (12.10.23 @ 12:21) >    IMPRESSION:  No acute pleural or parenchymal abnormality. Nonspecific 4 mm right   middle lobe nodule.  No acute abnormal finding or visceral injury.  Lytic osseous lesion involving left acetabulum, sacrum, and thoracic spine        --- End of Report ---    < end of copied text >      Assessment/Plan:   This is a 83y Male  presents with left acetabular lytic lesion with pathologic fracutre  Patient presents to IR for CT guided biopsy. Discussed with Dr Rand, Orthopedic surgery Ok to proceed with left acetabular lesion biopsy per there plane.  Procedure/ risks/ benefits/ goals/ alternatives were explained. All questions answered. Informed content obtained from patient. Consent placed in chart.      (14 Dec 2023 12:05)      INTERIM EVENTS:Patient seen at bedside ,interim events noted.      PMH -reviewed admission note, no change since admission  HEART FAILURE: Acute[ ]Chronic[ ] Systolic[ ] Diastolic[ ] Combined Systolic and Diastolic[ ]  CAD[ ] CABG[ ] PCI[ ]  DEVICES[ ] PPM[ ] ICD[ ] ILR[ ]  ATRIAL FIBRILLATION[ ] Paroxysmal[ ] Permanent[ ] CHADS2-[  ]  RIGOBERTO[ ] CKD1[ ] CKD2[ ] CKD3[ ] CKD4[ ] ESRD[ ]  COPD[ ] HTN[ ]   DM[ ] Type1[ ] Type 2[ ]   CVA[ ] Paresis[ ]    AMBULATION: Assisted[ ] Cane/walker[ ] Independent[ ]    MEDICATIONS  (STANDING):  acetaminophen     Tablet .. 650 milliGRAM(s) Oral every 12 hours  atorvastatin 10 milliGRAM(s) Oral at bedtime  dextrose 5%. 1000 milliLiter(s) (50 mL/Hr) IV Continuous <Continuous>  dextrose 5%. 1000 milliLiter(s) (100 mL/Hr) IV Continuous <Continuous>  dextrose 50% Injectable 25 Gram(s) IV Push once  dextrose 50% Injectable 12.5 Gram(s) IV Push once  dextrose 50% Injectable 25 Gram(s) IV Push once  glucagon  Injectable 1 milliGRAM(s) IntraMuscular once  insulin lispro (ADMELOG) corrective regimen sliding scale   SubCutaneous three times a day before meals  insulin lispro (ADMELOG) corrective regimen sliding scale   SubCutaneous at bedtime  levothyroxine 25 MICROGram(s) Oral daily  lidocaine   4% Patch 1 Patch Transdermal daily  multivitamin 1 Tablet(s) Oral daily  polyethylene glycol 3350 17 Gram(s) Oral daily  senna 2 Tablet(s) Oral at bedtime  Sodium Chloride 2% 500 milliLiter(s) 500 milliLiter(s) (40 mL/Hr) IV Continuous <Continuous>    MEDICATIONS  (PRN):  dextrose Oral Gel 15 Gram(s) Oral once PRN Blood Glucose LESS THAN 70 milliGRAM(s)/deciliter  guaiFENesin Oral Liquid (Sugar-Free) 100 milliGRAM(s) Oral every 6 hours PRN Cough  oxyCODONE    IR 5 milliGRAM(s) Oral every 8 hours PRN Severe Pain (7 - 10)            REVIEW OF SYSTEMS:  Constitutional: [ ] fever, [ ]weight loss,  [ ]fatigue [ ]weight gain  Eyes: [ ] visual changes  Respiratory: [ ]shortness of breath;  [ ] cough, [ ]wheezing, [ ]chills, [ ]hemoptysis  Cardiovascular: [ ] chest pain, [ ]palpitations, [ ]dizziness,  [ ]leg swelling[ ]orthopnea[ ]PND  Gastrointestinal: [ ] abdominal pain, [ ]nausea, [ ]vomiting,  [ ]diarrhea [ ]Constipation [ ]Melena  Genitourinary: [ ] dysuria, [ ] hematuria [ ]Srivastava  Neurologic: [ ] headaches [ ] tremors[ ]weakness [ ]Paralysis Right[ ] Left[ ]  Skin: [ ] itching, [ ]burning, [ ] rashes  Endocrine: [ ] heat or cold intolerance  Musculoskeletal: [ ] joint pain or swelling; [ ] muscle, back, or extremity pain  Psychiatric: [ ] depression, [ ]anxiety, [ ]mood swings, or [ ]difficulty sleeping  Hematologic: [ ] easy bruising, [ ] bleeding gums    [ ] All remaining systems negative except as per above.   [ ]Unable to obtain.  [x] No change in ROS since admission      Vital Signs Last 24 Hrs  T(C): 36.8 (14 Dec 2023 15:44), Max: 37.1 (14 Dec 2023 06:33)  T(F): 98.2 (14 Dec 2023 15:44), Max: 98.8 (14 Dec 2023 06:33)  HR: 85 (14 Dec 2023 15:44) (77 - 96)  BP: 117/56 (14 Dec 2023 15:44) (112/64 - 123/61)  BP(mean): --  RR: 17 (14 Dec 2023 15:44) (17 - 18)  SpO2: 100% (14 Dec 2023 15:44) (97% - 100%)    Parameters below as of 14 Dec 2023 15:44  Patient On (Oxygen Delivery Method): room air      I&O's Summary      PHYSICAL EXAM:  General: No acute distress BMI-  HEENT: EOMI, PERRL  Neck: Supple, [ ] JVD  Lungs: Equal air entry bilaterally; [ ] rales [ ] wheezing [ ] rhonchi  Heart: Regular rate and rhythm; [x ] murmur   2/6 [ x] systolic [ ] diastolic [ ] radiation[ ] rubs [ ]  gallops  Abdomen: Nontender, bowel sounds present  Extremities: No clubbing, cyanosis, [ ] edema [ ]Pulses  equal and intact  Nervous system:  Alert & Oriented X3, no focal deficits  Psychiatric: Normal affect  Skin: No rashes or lesions    LABS:  12-14    132<L>  |  99  |  24<H>  ----------------------------<  137<H>  4.4   |  22  |  1.01    Ca    8.9      14 Dec 2023 05:35  Phos  2.9     12-14  Mg     2.00     12-14    TPro  6.0  /  Alb  3.4  /  TBili  0.6  /  DBili  <0.2  /  AST  34  /  ALT  44<H>  /  AlkPhos  396<H>  12-14    Creatinine Trend: 1.01<--, 1.11<--, 1.21<--, 1.18<--, 1.11<--, 1.18<--                        10.4   6.09  )-----------( 222      ( 14 Dec 2023 05:35 )             29.6     PT/INR - ( 14 Dec 2023 05:35 )   PT: 11.0 sec;   INR: 0.97 ratio

## 2023-12-14 NOTE — PROGRESS NOTE ADULT - SUBJECTIVE AND OBJECTIVE BOX
[facility] NEPHROLOGY- PROGRESS NOTE    83y Male with history of prostate CA presents with inability to ambulate found to have L acetabular fracture. Nephrology consulted for hyponatremia.    REVIEW OF SYSTEMS:  Gen: no fevers  Cards: no chest pain  Resp: no dyspnea  GI: no nausea or vomiting or diarrhea  Vascular: no LE edema    No Known Allergies      Hospital Medications: Medications reviewed    VITALS:  T(F): 98.8 (12-14-23 @ 06:33), Max: 98.8 (12-14-23 @ 06:33)  HR: 96 (12-14-23 @ 06:33)  BP: 112/64 (12-14-23 @ 06:33)  RR: 18 (12-14-23 @ 06:33)  SpO2: 98% (12-14-23 @ 06:33)  Wt(kg): --  Height (cm): 157.5 (12-09 @ 21:39)  Weight (kg): 49.7 (12-09 @ 21:39)  BMI (kg/m2): 20 (12-09 @ 21:39)  BSA (m2): 1.48 (12-09 @ 21:39)      PHYSICAL EXAM:    Gen: NAD, calm  Cards: RRR, +S1/S2, no M/G/R  Resp: CTA B/L  GI: soft, NT/ND, NABS  Vascular: no LE edema B/L    LABS:  12-14    132<L>  |  99  |  24<H>  ----------------------------<  137<H>  4.4   |  22  |  1.01    Ca    8.9      14 Dec 2023 05:35  Phos  2.9     12-14  Mg     2.00     12-14    TPro  6.0  /  Alb  3.4  /  TBili  0.6  /  DBili  <0.2  /  AST  34  /  ALT  44<H>  /  AlkPhos  396<H>  12-14    Creatinine Trend: 1.01 <--, 1.11 <--, 1.21 <--, 1.18 <--, 1.11 <--, 1.18 <--                        10.4   6.09  )-----------( 222      ( 14 Dec 2023 05:35 )             29.6     Urine Studies:  Urinalysis Basic - ( 14 Dec 2023 05:35 )    Color:  / Appearance:  / SG:  / pH:   Gluc: 137 mg/dL / Ketone:   / Bili:  / Urobili:    Blood:  / Protein:  / Nitrite:    Leuk Esterase:  / RBC:  / WBC    Sq Epi:  / Non Sq Epi:  / Bacteria:       Sodium, Random Urine: 61 mmol/L (12-13 @ 09:53)  Osmolality, Random Urine: 506 mosm/kg (12-13 @ 09:53)      RADIOLOGY & ADDITIONAL STUDIES: [facility] NEPHROLOGY- PROGRESS NOTE    83y Male with history of prostate CA presents with inability to ambulate found to have L acetabular fracture. Nephrology consulted for hyponatremia.    REVIEW OF SYSTEMS:  Gen: no fevers  Cards: no chest pain  Resp: no dyspnea  GI: no nausea or vomiting or diarrhea  Vascular: no LE edema    No Known Allergies      Hospital Medications: Medications reviewed    VITALS:  T(F): 98.8 (12-14-23 @ 06:33), Max: 98.8 (12-14-23 @ 06:33)  HR: 96 (12-14-23 @ 06:33)  BP: 112/64 (12-14-23 @ 06:33)  RR: 18 (12-14-23 @ 06:33)  SpO2: 98% (12-14-23 @ 06:33)  Wt(kg): --  Height (cm): 157.5 (12-09 @ 21:39)  Weight (kg): 49.7 (12-09 @ 21:39)  BMI (kg/m2): 20 (12-09 @ 21:39)  BSA (m2): 1.48 (12-09 @ 21:39)      PHYSICAL EXAM:    Gen: NAD, calm  Cards: RRR, +S1/S2, no M/G/R  Resp: CTA B/L  GI: soft, NT/ND, NABS  Vascular: no LE edema B/L    LABS:  12-14    132<L>  |  99  |  24<H>  ----------------------------<  137<H>  4.4   |  22  |  1.01    Ca    8.9      14 Dec 2023 05:35  Phos  2.9     12-14  Mg     2.00     12-14    TPro  6.0  /  Alb  3.4  /  TBili  0.6  /  DBili  <0.2  /  AST  34  /  ALT  44<H>  /  AlkPhos  396<H>  12-14    Creatinine Trend: 1.01 <--, 1.11 <--, 1.21 <--, 1.18 <--, 1.11 <--, 1.18 <--                        10.4   6.09  )-----------( 222      ( 14 Dec 2023 05:35 )             29.6     Urine Studies:  Urinalysis Basic - ( 14 Dec 2023 05:35 )    Color:  / Appearance:  / SG:  / pH:   Gluc: 137 mg/dL / Ketone:   / Bili:  / Urobili:    Blood:  / Protein:  / Nitrite:    Leuk Esterase:  / RBC:  / WBC    Sq Epi:  / Non Sq Epi:  / Bacteria:       Sodium, Random Urine: 61 mmol/L (12-13 @ 09:53)  Osmolality, Random Urine: 506 mosm/kg (12-13 @ 09:53)      RADIOLOGY & ADDITIONAL STUDIES:

## 2023-12-14 NOTE — PROGRESS NOTE ADULT - PROBLEM SELECTOR PLAN 5
- Lipid panel in the am.   - Ordered for fenofibrate and omega 3, pravastatin but non adherent.   - C/w atorvastatin 10mg daily  while in the hospital

## 2023-12-14 NOTE — PROGRESS NOTE ADULT - PROBLEM SELECTOR PLAN 7
- Sodium 130. Differential diagnosis included SIADH. Repeat bmp    - stopped dash/tlc diet

## 2023-12-14 NOTE — PRE PROCEDURE NOTE - HISTORY OF PRESENT ILLNESS
Interventional Radiology  Pre-Procedure Note    This is a 83y  Male  presenting for pelvic bipsy    HPI:  Mr. Lucas Cordova is a 83 year old man with past medical history of diabetes mellitus, hyperlipidemia, hypothyroidism, anemia,  prostate cancer s/p radiation who presents for present presents for multiple joint pain for over week. He had a fall over 2 days ago because of weakness. He landed on his left side. Denied head trauma or lost of consciousness. H Pain is severe. Inability to perform his activity of daily living. He saw oncologist Dr. Taylor and was sent to the ER to the ER.     In the ER, initial vital signs were /55, HR 93, T 98.1 Saturating 100% on room air.  Patient endorses multiple area of pain. Mid back pain. Left arm and knee pain. He has decreased range of motion in left shoulder joint.     Of note, he left medications in [facility]. Has not been taking thyroid medications and others. Only metfomrin and fish oils as per daughter.     Of note, patient is scheduled for a PET scan outpatient.  (09 Dec 2023 09:08)      PAST MEDICAL & SURGICAL HISTORY:  Prostate cancer      Hyperlipidemia      Diabetes mellitus      Hypothyroidism      H/O prostate biopsy          Social History:     FAMILY HISTORY:  No pertinent family history in first degree relatives        Allergies: No Known Allergies      Current Medications: acetaminophen     Tablet .. 650 milliGRAM(s) Oral every 12 hours  atorvastatin 10 milliGRAM(s) Oral at bedtime  dextrose 5%. 1000 milliLiter(s) IV Continuous <Continuous>  dextrose 5%. 1000 milliLiter(s) IV Continuous <Continuous>  dextrose 50% Injectable 25 Gram(s) IV Push once  dextrose 50% Injectable 12.5 Gram(s) IV Push once  dextrose 50% Injectable 25 Gram(s) IV Push once  dextrose Oral Gel 15 Gram(s) Oral once PRN  glucagon  Injectable 1 milliGRAM(s) IntraMuscular once  guaiFENesin Oral Liquid (Sugar-Free) 100 milliGRAM(s) Oral every 6 hours PRN  insulin lispro (ADMELOG) corrective regimen sliding scale   SubCutaneous every 6 hours  levothyroxine 25 MICROGram(s) Oral daily  lidocaine   4% Patch 1 Patch Transdermal daily  multivitamin 1 Tablet(s) Oral daily  oxyCODONE    IR 5 milliGRAM(s) Oral every 8 hours PRN  polyethylene glycol 3350 17 Gram(s) Oral daily  senna 2 Tablet(s) Oral at bedtime  Sodium Chloride 2% 500 milliLiter(s) 500 milliLiter(s) IV Continuous <Continuous>      Labs:                         10.4   6.09  )-----------( 222      ( 14 Dec 2023 05:35 )             29.6       12-14    132<L>  |  99  |  24<H>  ----------------------------<  137<H>  4.4   |  22  |  1.01    Ca    8.9      14 Dec 2023 05:35  Phos  2.9     12-14  Mg     2.00     12-14    TPro  6.0  /  Alb  3.4  /  TBili  0.6  /  DBili  <0.2  /  AST  34  /  ALT  44<H>  /  AlkPhos  396<H>  12-14      Radiology:   < from: MR Pelvis Bony Only w/wo IV Cont (12.10.23 @ 23:01) >  IMPRESSION:    1.  Acute pathologic mildly displaced left acetabular fracture.  2.  Diffuse osteolytic lesions consistent with metastatic disease versus   myeloma, largest involving the left ilium.  3.  Scattered grade 1/2 strains of left hip girdle musculature. Left   greater trochanteric bursitis, likely hemorrhagic.    --- End of Report ---        < end of copied text >        < from: CT Abdomen and Pelvis w/ Oral Cont and w/ IV Cont (12.10.23 @ 12:21) >    IMPRESSION:  No acute pleural or parenchymal abnormality. Nonspecific 4 mm right   middle lobe nodule.  No acute abnormal finding or visceral injury.  Lytic osseous lesion involving left acetabulum, sacrum, and thoracic spine        --- End of Report ---    < end of copied text >      Assessment/Plan:   This is a 83y Male  presents with left acetabular lytic lesion with pathologic fracutre  Patient presents to IR for CT guided biopsy. Discussed with Dr Rand, Orthopedic surgery Ok to proceed with left acetabular lesion biopsy per there plane.  Procedure/ risks/ benefits/ goals/ alternatives were explained. All questions answered. Informed content obtained from patient. Consent placed in chart.      Interventional Radiology  Pre-Procedure Note    This is a 83y  Male  presenting for pelvic bipsy    HPI:  Mr. Lucas Cordova is a 83 year old man with past medical history of diabetes mellitus, hyperlipidemia, hypothyroidism, anemia,  prostate cancer s/p radiation who presents for present presents for multiple joint pain for over week. He had a fall over 2 days ago because of weakness. He landed on his left side. Denied head trauma or lost of consciousness. H Pain is severe. Inability to perform his activity of daily living. He saw oncologist Dr. Taylor and was sent to the ER to the ER.     In the ER, initial vital signs were /55, HR 93, T 98.1 Saturating 100% on room air.  Patient endorses multiple area of pain. Mid back pain. Left arm and knee pain. He has decreased range of motion in left shoulder joint.     Of note, he left medications in [facility]. Has not been taking thyroid medications and others. Only metfomrin and fish oils as per daughter.     Of note, patient is scheduled for a PET scan outpatient.  (09 Dec 2023 09:08)      PAST MEDICAL & SURGICAL HISTORY:  Prostate cancer      Hyperlipidemia      Diabetes mellitus      Hypothyroidism      H/O prostate biopsy          Social History:     FAMILY HISTORY:  No pertinent family history in first degree relatives        Allergies: No Known Allergies      Current Medications: acetaminophen     Tablet .. 650 milliGRAM(s) Oral every 12 hours  atorvastatin 10 milliGRAM(s) Oral at bedtime  dextrose 5%. 1000 milliLiter(s) IV Continuous <Continuous>  dextrose 5%. 1000 milliLiter(s) IV Continuous <Continuous>  dextrose 50% Injectable 25 Gram(s) IV Push once  dextrose 50% Injectable 12.5 Gram(s) IV Push once  dextrose 50% Injectable 25 Gram(s) IV Push once  dextrose Oral Gel 15 Gram(s) Oral once PRN  glucagon  Injectable 1 milliGRAM(s) IntraMuscular once  guaiFENesin Oral Liquid (Sugar-Free) 100 milliGRAM(s) Oral every 6 hours PRN  insulin lispro (ADMELOG) corrective regimen sliding scale   SubCutaneous every 6 hours  levothyroxine 25 MICROGram(s) Oral daily  lidocaine   4% Patch 1 Patch Transdermal daily  multivitamin 1 Tablet(s) Oral daily  oxyCODONE    IR 5 milliGRAM(s) Oral every 8 hours PRN  polyethylene glycol 3350 17 Gram(s) Oral daily  senna 2 Tablet(s) Oral at bedtime  Sodium Chloride 2% 500 milliLiter(s) 500 milliLiter(s) IV Continuous <Continuous>      Labs:                         10.4   6.09  )-----------( 222      ( 14 Dec 2023 05:35 )             29.6       12-14    132<L>  |  99  |  24<H>  ----------------------------<  137<H>  4.4   |  22  |  1.01    Ca    8.9      14 Dec 2023 05:35  Phos  2.9     12-14  Mg     2.00     12-14    TPro  6.0  /  Alb  3.4  /  TBili  0.6  /  DBili  <0.2  /  AST  34  /  ALT  44<H>  /  AlkPhos  396<H>  12-14      Radiology:   < from: MR Pelvis Bony Only w/wo IV Cont (12.10.23 @ 23:01) >  IMPRESSION:    1.  Acute pathologic mildly displaced left acetabular fracture.  2.  Diffuse osteolytic lesions consistent with metastatic disease versus   myeloma, largest involving the left ilium.  3.  Scattered grade 1/2 strains of left hip girdle musculature. Left   greater trochanteric bursitis, likely hemorrhagic.    --- End of Report ---        < end of copied text >        < from: CT Abdomen and Pelvis w/ Oral Cont and w/ IV Cont (12.10.23 @ 12:21) >    IMPRESSION:  No acute pleural or parenchymal abnormality. Nonspecific 4 mm right   middle lobe nodule.  No acute abnormal finding or visceral injury.  Lytic osseous lesion involving left acetabulum, sacrum, and thoracic spine        --- End of Report ---    < end of copied text >      Assessment/Plan:   This is a 83y Male  presents with left acetabular lytic lesion with pathologic fracutre  Patient presents to IR for CT guided biopsy. Discussed with Dr Rand, Orthopedic surgery Ok to proceed with left acetabular lesion biopsy per there plane.  Procedure/ risks/ benefits/ goals/ alternatives were explained. All questions answered. Informed content obtained from patient. Consent placed in chart.

## 2023-12-14 NOTE — CHART NOTE - NSCHARTNOTEFT_GEN_A_CORE
As per discussion with Rad Onc, no need for patient to remain inpatient for biopsy results however given notable fracture, surgical opinion would take precedent. As per discussion with orthopedic surgery, no further interventions recommended as inpatient and no objection to discharge from orthopedic standpoint.     Elisabeth Cadet PA-C  Department of Medicine   In-house pager #[phone] As per discussion with Rad Onc, no need for patient to remain inpatient for biopsy results however given notable fracture, surgical opinion would take precedent. As per discussion with orthopedic surgery, no further interventions recommended as inpatient and no objection to discharge from orthopedic standpoint.     Elisabeth Cadet PA-C  Department of Medicine   In-house pager #[phone]

## 2023-12-14 NOTE — PROGRESS NOTE ADULT - PROBLEM SELECTOR PLAN 8
- appreciated on ct scan.   - MRI of the spine ordered.   - outpatient dexa scan  - vitamin d level ordered

## 2023-12-14 NOTE — PROGRESS NOTE ADULT - PROBLEM SELECTOR PROBLEM 3
Anemia in neoplastic disease

## 2023-12-15 ENCOUNTER — TRANSCRIPTION ENCOUNTER (OUTPATIENT)
Age: 83
End: 2023-12-15

## 2023-12-15 VITALS
OXYGEN SATURATION: 99 % | HEART RATE: 79 BPM | DIASTOLIC BLOOD PRESSURE: 54 MMHG | TEMPERATURE: 98 F | RESPIRATION RATE: 18 BRPM | SYSTOLIC BLOOD PRESSURE: 113 MMHG

## 2023-12-15 LAB
ALBUMIN SERPL ELPH-MCNC: 3.2 G/DL — LOW (ref 3.3–5)
ALBUMIN SERPL ELPH-MCNC: 3.2 G/DL — LOW (ref 3.3–5)
ALP SERPL-CCNC: 364 U/L — HIGH (ref 40–120)
ALP SERPL-CCNC: 364 U/L — HIGH (ref 40–120)
ALT FLD-CCNC: 37 U/L — SIGNIFICANT CHANGE UP (ref 4–41)
ALT FLD-CCNC: 37 U/L — SIGNIFICANT CHANGE UP (ref 4–41)
ANION GAP SERPL CALC-SCNC: 9 MMOL/L — SIGNIFICANT CHANGE UP (ref 7–14)
ANION GAP SERPL CALC-SCNC: 9 MMOL/L — SIGNIFICANT CHANGE UP (ref 7–14)
AST SERPL-CCNC: 33 U/L — SIGNIFICANT CHANGE UP (ref 4–40)
AST SERPL-CCNC: 33 U/L — SIGNIFICANT CHANGE UP (ref 4–40)
BILIRUB SERPL-MCNC: 0.4 MG/DL — SIGNIFICANT CHANGE UP (ref 0.2–1.2)
BILIRUB SERPL-MCNC: 0.4 MG/DL — SIGNIFICANT CHANGE UP (ref 0.2–1.2)
BUN SERPL-MCNC: 23 MG/DL — SIGNIFICANT CHANGE UP (ref 7–23)
BUN SERPL-MCNC: 23 MG/DL — SIGNIFICANT CHANGE UP (ref 7–23)
CALCIUM SERPL-MCNC: 8.5 MG/DL — SIGNIFICANT CHANGE UP (ref 8.4–10.5)
CALCIUM SERPL-MCNC: 8.5 MG/DL — SIGNIFICANT CHANGE UP (ref 8.4–10.5)
CHLORIDE SERPL-SCNC: 100 MMOL/L — SIGNIFICANT CHANGE UP (ref 98–107)
CHLORIDE SERPL-SCNC: 100 MMOL/L — SIGNIFICANT CHANGE UP (ref 98–107)
CO2 SERPL-SCNC: 22 MMOL/L — SIGNIFICANT CHANGE UP (ref 22–31)
CO2 SERPL-SCNC: 22 MMOL/L — SIGNIFICANT CHANGE UP (ref 22–31)
CREAT SERPL-MCNC: 0.9 MG/DL — SIGNIFICANT CHANGE UP (ref 0.5–1.3)
CREAT SERPL-MCNC: 0.9 MG/DL — SIGNIFICANT CHANGE UP (ref 0.5–1.3)
EGFR: 85 ML/MIN/1.73M2 — SIGNIFICANT CHANGE UP
EGFR: 85 ML/MIN/1.73M2 — SIGNIFICANT CHANGE UP
GLUCOSE BLDC GLUCOMTR-MCNC: 146 MG/DL — HIGH (ref 70–99)
GLUCOSE BLDC GLUCOMTR-MCNC: 146 MG/DL — HIGH (ref 70–99)
GLUCOSE BLDC GLUCOMTR-MCNC: 218 MG/DL — HIGH (ref 70–99)
GLUCOSE BLDC GLUCOMTR-MCNC: 218 MG/DL — HIGH (ref 70–99)
GLUCOSE SERPL-MCNC: 140 MG/DL — HIGH (ref 70–99)
GLUCOSE SERPL-MCNC: 140 MG/DL — HIGH (ref 70–99)
HCT VFR BLD CALC: 27.8 % — LOW (ref 39–50)
HCT VFR BLD CALC: 27.8 % — LOW (ref 39–50)
HGB BLD-MCNC: 9.5 G/DL — LOW (ref 13–17)
HGB BLD-MCNC: 9.5 G/DL — LOW (ref 13–17)
MAGNESIUM SERPL-MCNC: 1.9 MG/DL — SIGNIFICANT CHANGE UP (ref 1.6–2.6)
MAGNESIUM SERPL-MCNC: 1.9 MG/DL — SIGNIFICANT CHANGE UP (ref 1.6–2.6)
MCHC RBC-ENTMCNC: 29.1 PG — SIGNIFICANT CHANGE UP (ref 27–34)
MCHC RBC-ENTMCNC: 29.1 PG — SIGNIFICANT CHANGE UP (ref 27–34)
MCHC RBC-ENTMCNC: 34.2 GM/DL — SIGNIFICANT CHANGE UP (ref 32–36)
MCHC RBC-ENTMCNC: 34.2 GM/DL — SIGNIFICANT CHANGE UP (ref 32–36)
MCV RBC AUTO: 85 FL — SIGNIFICANT CHANGE UP (ref 80–100)
MCV RBC AUTO: 85 FL — SIGNIFICANT CHANGE UP (ref 80–100)
NRBC # BLD: 0 /100 WBCS — SIGNIFICANT CHANGE UP (ref 0–0)
NRBC # BLD: 0 /100 WBCS — SIGNIFICANT CHANGE UP (ref 0–0)
NRBC # FLD: 0 K/UL — SIGNIFICANT CHANGE UP (ref 0–0)
NRBC # FLD: 0 K/UL — SIGNIFICANT CHANGE UP (ref 0–0)
PHOSPHATE SERPL-MCNC: 2.7 MG/DL — SIGNIFICANT CHANGE UP (ref 2.5–4.5)
PHOSPHATE SERPL-MCNC: 2.7 MG/DL — SIGNIFICANT CHANGE UP (ref 2.5–4.5)
PLATELET # BLD AUTO: 193 K/UL — SIGNIFICANT CHANGE UP (ref 150–400)
PLATELET # BLD AUTO: 193 K/UL — SIGNIFICANT CHANGE UP (ref 150–400)
POTASSIUM SERPL-MCNC: 4.3 MMOL/L — SIGNIFICANT CHANGE UP (ref 3.5–5.3)
POTASSIUM SERPL-MCNC: 4.3 MMOL/L — SIGNIFICANT CHANGE UP (ref 3.5–5.3)
POTASSIUM SERPL-SCNC: 4.3 MMOL/L — SIGNIFICANT CHANGE UP (ref 3.5–5.3)
POTASSIUM SERPL-SCNC: 4.3 MMOL/L — SIGNIFICANT CHANGE UP (ref 3.5–5.3)
PROT SERPL-MCNC: 5.8 G/DL — LOW (ref 6–8.3)
PROT SERPL-MCNC: 5.8 G/DL — LOW (ref 6–8.3)
RBC # BLD: 3.27 M/UL — LOW (ref 4.2–5.8)
RBC # BLD: 3.27 M/UL — LOW (ref 4.2–5.8)
RBC # FLD: 13.3 % — SIGNIFICANT CHANGE UP (ref 10.3–14.5)
RBC # FLD: 13.3 % — SIGNIFICANT CHANGE UP (ref 10.3–14.5)
SODIUM SERPL-SCNC: 131 MMOL/L — LOW (ref 135–145)
SODIUM SERPL-SCNC: 131 MMOL/L — LOW (ref 135–145)
WBC # BLD: 5.69 K/UL — SIGNIFICANT CHANGE UP (ref 3.8–10.5)
WBC # BLD: 5.69 K/UL — SIGNIFICANT CHANGE UP (ref 3.8–10.5)
WBC # FLD AUTO: 5.69 K/UL — SIGNIFICANT CHANGE UP (ref 3.8–10.5)
WBC # FLD AUTO: 5.69 K/UL — SIGNIFICANT CHANGE UP (ref 3.8–10.5)

## 2023-12-15 RX ADMIN — Medication 25 MICROGRAM(S): at 05:58

## 2023-12-15 RX ADMIN — Medication 1 TABLET(S): at 12:38

## 2023-12-15 RX ADMIN — LIDOCAINE 1 PATCH: 4 CREAM TOPICAL at 12:39

## 2023-12-15 RX ADMIN — Medication 2: at 12:37

## 2023-12-15 RX ADMIN — Medication 650 MILLIGRAM(S): at 05:58

## 2023-12-15 NOTE — DISCHARGE NOTE NURSING/CASE MANAGEMENT/SOCIAL WORK - NSDCFUADDAPPT_GEN_ALL_CORE_FT
Follow up with Dr. Taylor at [facility]. The oncology team was made aware of your discharge and will help you coordinate an appointment. Please reach out to the office to confirm appointment. You can discuss your bone biopsy result with oncology who can then refer you to radiation oncology for radiation therapy.     Follow up with your primary care physician for further monitoring in 1-2 weeks. Please call to arrange appointment.  Follow up with Dr. Taylor at [facility]. The oncology team was made aware of your discharge and will help you coordinate an appointment. Please reach out to the office to confirm appointment. You can discuss your bone biopsy result with oncology who can then refer you to radiation oncology for radiation therapy.     Follow up with your primary care physician for further monitoring in 1-2 weeks. Please call to arrange appointment.

## 2023-12-15 NOTE — DISCHARGE NOTE PROVIDER - CARE PROVIDER_API CALL
Garry Wahl [facility]  Internal Medicine  [address] [place] [address]  Phone: [phone]  Fax: [phone]  Follow Up Time:    Garry Wahl [facility]  Internal Medicine  [address] [place] [address]  Phone: [phone]  Fax: [phone]  Follow Up Time:

## 2023-12-15 NOTE — DISCHARGE NOTE PROVIDER - NSDCFUADDAPPT_GEN_ALL_CORE_FT
Follow up with Dr. Taylor at [facility]. The oncology team was made aware of your discharge and will help you coordinate an appointment. Please reach out to the office to confirm appointment. You can discuss your bone biopsy result with oncology who can then refer you to radiation oncology for radiation therapy.     Follow up with your primary care physician for further monitoring in 1-2 weeks. Please call to arrange appointment.  Follow up with Dr. Taylor at [facility]. The oncology team was made aware of your discharge and will help you coordinate an appointment. Please reach out to the office to confirm appointment. You can discuss your bone biopsy result with oncology who can then refer you to radiation oncology for radiation therapy.     Follow up with your primary care physician for further monitoring in 1-2 weeks. Please call to arrange appointment.

## 2023-12-15 NOTE — DISCHARGE NOTE PROVIDER - HOSPITAL COURSE
83 male past medical history diabetes, prostate cancer not on treatment, hyperlipidemia, hypothyroidism, anemia presenting with left sided pain after fall 2 days prior to admission.     Fall  - CT scan showing Mild to moderate compression deformity of the L1 vertebral body appears chronic  - MRI C/T/L spine -Diffuse osseous metastases  - MRI Hip / Pelvis- Acute pathologic mildly displaced left acetabular fracture. Diffuse osteolytic lesions consistent with metastatic disease vs myeloma, largest involving the left ilium  - Bone scan - Multifocal osseous metastases  - Fall precautions   - Pain control   - PT rec outpatient PT     Left pelvic lytic lesion with associated acetabular fracture   - Ortho consulted   - PWB LLE w walker  - S/p IR biopsy of pelvic lesion 12/14  - No further ortho intervention inpatient; no objection to discharge from orthopedic perspective   - Per rad onc, no need to wait inpatient for bx results, can discuss RTx once bx results as outpatient     Prostate cancer metastatic to bone  - 01/2019 diagnosed stage IIIB ([id number]) adenocarcinoma of the prostate, [place] score of 3+4=7, pre-treatment PSA of PSA of 35.95 ng/mL   - 02/06/2019 CT A/P showed prostate enhancement with extraprostatic extension. Bone scan showed GI  - Rad/ Onc- s/p Radiation therapy from 5/1/2020 to 6/10/2020, total dose of 7,000 cGy  - PSA level of ~1000 on december 5th. Alk phos elevated likely to bone involvement  - CT scan showing Nonspecific multifocal lytic lesions involving the thoracic vertebrae are nonspecific. The largest involve the T10 and T12 vertebral bodies  - per Oncology Dr. Taylor plan to start Bicalutamide 50mg after PSMA PET when pt d/c  - Will follow with Dr. Taylor at [facility]     Anemia in neoplastic disease  - Likely anemia of chronic disease. Iron studies -low, Ferritin- 695, B12 1706  - c/w multivitamin    T2DM   - Holding metformin 500mg BID and farxiga at home.   - Insulin therapy while in the hospital, A1c -7.3%    Hyperlipidemia  - Lipid panel -wnl   - Ordered for fenofibrate and omega 3, pravastatin but non adherent  - C/w atorvastatin 10mg daily  while in the hospital    Hypothyroidism  - has not been taking levothyroxine, restarted at 25mcg daily  - TSH- 3.99, FT4- wnl, Vitamin D- 24.4    Vertebral compression fracture on ct scan   - pt will need DEXA scan and start calcium and vit D    Hyponatremia  - 12/13 - Nephro consulted  - Fluid restriction  - S/p Hypertonic saline     On 12/15/23 this case was reviewed with Dr. Lockwood. The patient is medically stable and optimized for discharge. All medications were reviewed and prescriptions were sent to mutually agreed upon pharmacy. 83 male past medical history diabetes, prostate cancer not on treatment, hyperlipidemia, hypothyroidism, anemia presenting with left sided pain after fall 2 days prior to admission.     Fall  - CT scan showing Mild to moderate compression deformity of the L1 vertebral body appears chronic  - MRI C/T/L spine -Diffuse osseous metastases  - MRI Hip / Pelvis- Acute pathologic mildly displaced left acetabular fracture. Diffuse osteolytic lesions consistent with metastatic disease vs myeloma, largest involving the left ilium  - Bone scan - Multifocal osseous metastases  - Fall precautions   - Pain control   - PT rec outpatient PT     Left pelvic lytic lesion with associated acetabular fracture   - Ortho consulted   - PWB LLE w walker  - S/p IR biopsy of pelvic lesion 12/14  - No further ortho intervention inpatient; no objection to discharge from orthopedic perspective   - Per rad onc, no need to wait inpatient for bx results, can discuss RTx once bx results as outpatient     Prostate cancer metastatic to bone  - 01/2019 diagnosed stage IIIB ([id number]) adenocarcinoma of the prostate, [place] score of 3+4=7, pre-treatment PSA of PSA of 35.95 ng/mL   - 02/06/2019 CT A/P showed prostate enhancement with extraprostatic extension. Bone scan showed GI  - Rad/ Onc- s/p Radiation therapy from 5/1/2020 to 6/10/2020, total dose of 7,000 cGy  - PSA level of ~1000 on december 5th. Alk phos elevated likely to bone involvement  - CT scan showing Nonspecific multifocal lytic lesions involving the thoracic vertebrae are nonspecific. The largest involve the T10 and T12 vertebral bodies  - per Oncology Dr. Taylor plan to start Bicalutamide 50mg after PSMA PET when pt d/c  - Will follow with Dr. Taylor at [facility]     Anemia in neoplastic disease  - Likely anemia of chronic disease. Iron studies -low, Ferritin- 695, B12 1706  - c/w multivitamin    T2DM   - Holding metformin 500mg BID and farxiga at home.   - Insulin therapy while in the hospital, A1c -7.3%    Hyperlipidemia  - Lipid panel -wnl   - Ordered for fenofibrate and omega 3, pravastatin but non adherent  - C/w atorvastatin 10mg daily  while in the hospital    Hypothyroidism  - has not been taking levothyroxine, restarted at 25mcg daily  - TSH- 3.99, FT4- wnl, Vitamin D- 24.4    Vertebral compression fracture on ct scan   - pt will need DEXA scan and start calcium and vit D    Hyponatremia  - 12/13 - Nephro consulted  - Fluid restriction  - S/p Hypertonic saline     On 12/15/23 this case was reviewed with Dr. Lockwood. The patient is medically stable and optimized for discharge. All medications were reviewed and prescriptions were sent to mutually agreed upon pharmacy.

## 2023-12-15 NOTE — PROGRESS NOTE ADULT - REASON FOR ADMISSION
Recent fall/ Metastatic prostate cancer with bone involvement pain

## 2023-12-15 NOTE — DISCHARGE NOTE PROVIDER - NSDCMRMEDTOKEN_GEN_ALL_CORE_FT
Farxiga 10 mg oral tablet: 1 tab(s) orally  fenofibrate 145 mg oral tablet: 1 tab(s) orally  levothyroxine 25 mcg (0.025 mg) oral tablet: 1 tab(s) orally  metFORMIN 500 mg oral tablet: 1 tab(s) orally 2 times a day  Omega-3 350 mg oral capsule: 1 cap(s) orally  Physical therapy: 3 times per week for 2-3 weeks  pravastatin 10 mg oral tablet: 1 tab(s) orally   Farxiga 10 mg oral tablet: 1 tab(s) orally  fenofibrate 145 mg oral tablet: 1 tab(s) orally  levothyroxine 25 mcg (0.025 mg) oral tablet: 1 tab(s) orally  metFORMIN 500 mg oral tablet: 1 tab(s) orally 2 times a day  Multiple Vitamins oral tablet: 1 tab(s) orally once a day  Omega-3 350 mg oral capsule: 1 cap(s) orally  pravastatin 10 mg oral tablet: 1 tab(s) orally

## 2023-12-15 NOTE — DISCHARGE NOTE PROVIDER - NSDCCPTREATMENT_GEN_ALL_CORE_FT
PRINCIPAL PROCEDURE  Procedure: NM scan bone 3 phase  Findings and Treatment: FINDINGS: Innumerable osseous lesions in the spine better seen on MRI and   in the left acetabulum where there is lytic lesions on the CT.  Scattered foci of increased activity can be seen in the parietal bone,   right scapula, ribs, spine, left humerus and in the pelvis notably the   left acetabulum and left iliac bone. Focus localizing to the lesser   trochanter of the left femur related to the lytic lesion with cortical   thinning with no cortical break. Findings compatible with widespread   osseous metastases. Please correlate findings with scheduled bone biopsy.  Both kidneys are visualized.  IMPRESSION: Multifocal osseous metastases.      SECONDARY PROCEDURE  Procedure: MRI LS spine  Findings and Treatment: MRI Cervical thoracic and lumbar spine:   IMPRESSION:  1. CERVICAL SPINE:   Diffuse osseous metastases. No significant epidural   disease. Intermediate grade degenerative disc disease greatest in the mid   cervical spine  2. THORACIC SPINE:   Diffuse osseous metastases. Mild ventral canal   epidural disease T8 and T12. No significant degenerative disc disease  3. LUMBAR SPINE:   Diffuse osseous metastases. L1 superior endplate   fracture appears to be pre-existing. No significant epidural disease. No   significant degenerative disc disease    Procedure: MR pelvis  Findings and Treatment: IMPRESSION:  1.  Acute pathologic mildly displaced left acetabular fracture.  2.  Diffuse osteolytic lesions consistent with metastatic disease versus   myeloma, largest involving the left ilium.  3.  Scattered grade 1/2 strains of left hip girdle musculature. Left   greater trochanteric bursitis, likely hemorrhagic.    Procedure: CT of chest, abdomen, and pelvis  Findings and Treatment: IMPRESSION:  No acute pleural or parenchymal abnormality. Nonspecific 4 mm right   middle lobe nodule.  No acute abnormal finding or visceral injury.  Lytic osseous lesion involving left acetabulum, sacrum, and thoracic spine    Procedure: CT head  Findings and Treatment: IMPRESSION:  1.  No acute intracranial abnormality.  2.  Very mild sinus disease.

## 2023-12-15 NOTE — PROGRESS NOTE ADULT - PROVIDER SPECIALTY LIST ADULT
Orthopedics
Nephrology
Nephrology
Orthopedics
Cardiology

## 2023-12-15 NOTE — DISCHARGE NOTE NURSING/CASE MANAGEMENT/SOCIAL WORK - PATIENT PORTAL LINK FT
You can access the FollowMyHealth Patient Portal offered by [facility] by registering at the following website: [email or web address]. By joining [employer]’s FollowMyHealth portal, you will also be able to view your health information using other applications (apps) compatible with our system. You can access the FollowMyHealth Patient Portal offered by [facility] by registering at the following website: [email or web address]. By joining [employer]’s FollowMyHealth portal, you will also be able to view your health information using other applications (apps) compatible with our system.

## 2023-12-15 NOTE — DISCHARGE NOTE PROVIDER - NSFOLLOWUPCLINICS_GEN_ALL_ED_FT
[facility]  Hematology/Oncology  [address] [id number]  Phone: [phone]  Fax:      [facility]  Hematology/Oncology  [address] [id number]  Phone: [phone]  Fax:

## 2023-12-15 NOTE — PROGRESS NOTE ADULT - SUBJECTIVE AND OBJECTIVE BOX
[facility] NEPHROLOGY- PROGRESS NOTE    83y Male with history of prostate CA presents with inability to ambulate found to have L acetabular fracture. Nephrology consulted for hyponatremia.    REVIEW OF SYSTEMS:  Gen: no fevers  Cards: no chest pain  Resp: no dyspnea  GI: no nausea or vomiting or diarrhea  Vascular: no LE edema    No Known Allergies      Hospital Medications: Medications reviewed      VITALS:  T(F): 98.6 (12-15-23 @ 05:55), Max: 98.6 (12-15-23 @ 05:55)  HR: 79 (12-15-23 @ 05:55)  BP: 131/73 (12-15-23 @ 05:55)  RR: 17 (12-15-23 @ 05:55)  SpO2: 100% (12-15-23 @ 05:55)  Wt(kg): --      PHYSICAL EXAM:    Gen: NAD, calm  Cards: RRR, +S1/S2, no M/G/R  Resp: CTA B/L  GI: soft, NT/ND, NABS  Vascular: no LE edema B/L        LABS:  12-15    131<L>  |  100  |  23  ----------------------------<  140<H>  4.3   |  22  |  0.90    Ca    8.5      15 Dec 2023 06:27  Phos  2.7     12-15  Mg     1.90     12-15    TPro  5.8<L>  /  Alb  3.2<L>  /  TBili  0.4  /  DBili      /  AST  33  /  ALT  37  /  AlkPhos  364<H>  12-15    Creatinine Trend: 0.90 <--, 1.01 <--, 1.11 <--, 1.21 <--, 1.18 <--, 1.11 <--, 1.18 <--                        9.5    5.69  )-----------( 193      ( 15 Dec 2023 06:27 )             27.8     Urine Studies:  Urinalysis Basic - ( 15 Dec 2023 06:27 )    Color:  / Appearance:  / SG:  / pH:   Gluc: 140 mg/dL / Ketone:   / Bili:  / Urobili:    Blood:  / Protein:  / Nitrite:    Leuk Esterase:  / RBC:  / WBC    Sq Epi:  / Non Sq Epi:  / Bacteria:       Sodium, Random Urine: 61 mmol/L (12-13 @ 09:53)  Osmolality, Random Urine: 506 mosm/kg (12-13 @ 09:53)     [facility] NEPHROLOGY- PROGRESS NOTE    83y Male with history of prostate CA presents with inability to ambulate found to have L acetabular fracture. Nephrology consulted for hyponatremia.    REVIEW OF SYSTEMS:  Gen: no fevers  Cards: no chest pain  Resp: no dyspnea  GI: no nausea or vomiting or diarrhea  Vascular: no LE edema    No Known Allergies      Hospital Medications: Medications reviewed      VITALS:  T(F): 98.6 (12-15-23 @ 05:55), Max: 98.6 (12-15-23 @ 05:55)  HR: 79 (12-15-23 @ 05:55)  BP: 131/73 (12-15-23 @ 05:55)  RR: 17 (12-15-23 @ 05:55)  SpO2: 100% (12-15-23 @ 05:55)  Wt(kg): --      PHYSICAL EXAM:    Gen: NAD, calm  Cards: RRR, +S1/S2, no M/G/R  Resp: CTA B/L  GI: soft, NT/ND, NABS  Vascular: no LE edema B/L        LABS:  12-15    131<L>  |  100  |  23  ----------------------------<  140<H>  4.3   |  22  |  0.90    Ca    8.5      15 Dec 2023 06:27  Phos  2.7     12-15  Mg     1.90     12-15    TPro  5.8<L>  /  Alb  3.2<L>  /  TBili  0.4  /  DBili      /  AST  33  /  ALT  37  /  AlkPhos  364<H>  12-15    Creatinine Trend: 0.90 <--, 1.01 <--, 1.11 <--, 1.21 <--, 1.18 <--, 1.11 <--, 1.18 <--                        9.5    5.69  )-----------( 193      ( 15 Dec 2023 06:27 )             27.8     Urine Studies:  Urinalysis Basic - ( 15 Dec 2023 06:27 )    Color:  / Appearance:  / SG:  / pH:   Gluc: 140 mg/dL / Ketone:   / Bili:  / Urobili:    Blood:  / Protein:  / Nitrite:    Leuk Esterase:  / RBC:  / WBC    Sq Epi:  / Non Sq Epi:  / Bacteria:       Sodium, Random Urine: 61 mmol/L (12-13 @ 09:53)  Osmolality, Random Urine: 506 mosm/kg (12-13 @ 09:53)

## 2023-12-15 NOTE — DISCHARGE NOTE PROVIDER - NSDCCPCAREPLAN_GEN_ALL_CORE_FT
PRINCIPAL DISCHARGE DIAGNOSIS  Diagnosis: Prostate cancer metastatic to bone  Assessment and Plan of Treatment: You presented to [facility] after a fall and found to have mild to moderate compression deformities of the lumbar spine appearing chronic, diffuse metastatic disease (spread of cancer) to the bone, and a pathologic left hip / pelvic bone fracture on imaging studies. You had a bone biopsy of the left pelvis on 12/14/23 by interventional radiology. You were seen by orthopedic surgery and recommended to be partial weight bearing of left lower extremity with walker. You were seen by Physical therapy who recommended outpatient physical therapy. A script was given to you for physical therapy. You may take Tylenol as needed for pain. You should follow up with radiation oncology after bone biopsy results to discuss initiation of radiation therapy. You should follow up with Oncology Dr. Taylor to plan to start Bicalutamide therapy after PET SCAN once discharged. Please call office to confirm appointment for after discharge.         SECONDARY DISCHARGE DIAGNOSES  Diagnosis: T2DM (type 2 diabetes mellitus)  Assessment and Plan of Treatment: Low salt, fat and carbohydrate diet, minimize glucose intake.  Follow up with primary care physician and endocrinologist for routine Hemoglobin A1C checks and management.  Follow up with your ophthalmologist for routine yearly vision exams.    Diagnosis: Hyperlipidemia  Assessment and Plan of Treatment: Low fat diet, exercise daily and continue current medications.    Diagnosis: Hypothyroidism  Assessment and Plan of Treatment: Continue Synthroid.    Diagnosis: Hyponatremia  Assessment and Plan of Treatment: You were followed by the kidney doctor for low sodium levels which improved with fluid restriction and IV fluids. You should follow up with your PCP for repeat blood work and monitoring of sodium levels.     PRINCIPAL DISCHARGE DIAGNOSIS  Diagnosis: Prostate cancer metastatic to bone  Assessment and Plan of Treatment: You presented to [facility] after a fall and found to have mild to moderate compression deformities of the lumbar spine appearing chronic, diffuse metastatic disease (spread of cancer) to the bone, and a pathologic left hip / pelvic bone fracture on imaging studies. You had a bone biopsy of the left pelvis on 12/14/23 by interventional radiology. You were seen by orthopedic surgery and recommended to be partial weight bearing of left lower extremity with walker. You were seen by Physical therapy who recommended outpatient physical therapy. A script was given to you for physical therapy. You may take Tylenol as needed for pain. You should follow up with radiation oncology after bone biopsy results to discuss initiation of radiation therapy. You should follow up with Oncology Dr. Taylor to plan to start Bicalutamide therapy after PET SCAN once discharged. Please call office to confirm appointment for after discharge.         SECONDARY DISCHARGE DIAGNOSES  Diagnosis: T2DM (type 2 diabetes mellitus)  Assessment and Plan of Treatment: Low salt, fat and carbohydrate diet, minimize glucose intake.  Follow up with primary care physician and endocrinologist for routine Hemoglobin A1C checks and management.  Follow up with your ophthalmologist for routine yearly vision exams.    Diagnosis: Hyperlipidemia  Assessment and Plan of Treatment: Low fat diet, exercise daily and continue current medications.    Diagnosis: Hypothyroidism  Assessment and Plan of Treatment: Continue Synthroid.    Diagnosis: Hyponatremia  Assessment and Plan of Treatment: You were followed by the kidney doctor for low sodium levels which improved with fluid restriction and IV fluids. You should follow up with your PCP for repeat blood work and monitoring of sodium levels.

## 2023-12-15 NOTE — DISCHARGE NOTE NURSING/CASE MANAGEMENT/SOCIAL WORK - NSDCPEFALRISK_GEN_ALL_CORE
For information on Fall & Injury Prevention, visit: [email or web address] OR  [email or web address] OR  https://www.cdc.gov/steadi/patient.html For information on Fall & Injury Prevention, visit: [email or web address] OR  [email or web address] OR  https://www.cdc.gov/steadi/patient.html

## 2023-12-15 NOTE — PROGRESS NOTE ADULT - ASSESSMENT
83y Male with history of prostate CA presents with inability to ambulate found to have L acetabular fracture. Nephrology consulted for hyponatremia.    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 Hyponatremia improved s/p HTS. Continue with 1L FR and Glucerna with meals. Monitor serum Na.    2) CKD-3a: Silver Scr appears to be 1-1.2 and likely baseline. CT reviewed. Outpatient CKD work up. Avoid nephrotoxins. Monitor electrolytes.    3) HTN with CKD: BP controlled. Monitor off medications.    4) Prostate CA: Patient will need Urology and Onc evaluation.      [facility] NEPHROLOGY  Braulio Jeff M.D.  Vinicius Greenberg D.O.  Jyoti Michelel M.D.  MD Zaynab Moon, MSN, ANP-C    Telephone: [phone]  Facsimile: [phone]    [address] [place] [address], [address]  [place] [address]         83y Male with history of prostate CA presents with inability to ambulate found to have L acetabular fracture. Nephrology consulted for hyponatremia.    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 Hyponatremia improved s/p HTS. Continue with 1L FR and Glucerna with meals. Monitor serum Na.    2) CKD-3a: Silver Scr appears to be 1-1.2 and likely baseline. CT reviewed. Outpatient CKD work up. Avoid nephrotoxins. Monitor electrolytes.    3) HTN with CKD: BP controlled. Monitor off medications.    4) Prostate CA: Patient will need Urology and Onc evaluation.      [facility] NEPHROLOGY  Braulio Jeff M.D.  Vinicius Greenberg D.O.  Jyoti Michelle M.D.  MD Zaynab Moon, MSN, ANP-C    Telephone: [phone]  Facsimile: [phone]    [address] [place] [address], [address]  [place] [address]

## 2023-12-19 ENCOUNTER — NON-APPOINTMENT (OUTPATIENT)
Age: 83
End: 2023-12-19

## 2023-12-19 PROBLEM — E11.9 TYPE 2 DIABETES MELLITUS WITHOUT COMPLICATIONS: Chronic | Status: ACTIVE | Noted: 2023-12-09

## 2023-12-19 PROBLEM — E78.5 HYPERLIPIDEMIA, UNSPECIFIED: Chronic | Status: ACTIVE | Noted: 2023-12-09

## 2023-12-19 PROBLEM — E03.9 HYPOTHYROIDISM, UNSPECIFIED: Chronic | Status: ACTIVE | Noted: 2023-12-09

## 2023-12-19 PROBLEM — C61 MALIGNANT NEOPLASM OF PROSTATE: Chronic | Status: ACTIVE | Noted: 2023-12-09

## 2023-12-20 ENCOUNTER — NON-APPOINTMENT (OUTPATIENT)
Age: 83
End: 2023-12-20

## 2023-12-20 DIAGNOSIS — C79.51 SECONDARY MALIGNANT NEOPLASM OF BONE: ICD-10-CM

## 2023-12-20 LAB
NON-GYNECOLOGICAL CYTOLOGY STUDY: SIGNIFICANT CHANGE UP
NON-GYNECOLOGICAL CYTOLOGY STUDY: SIGNIFICANT CHANGE UP

## 2023-12-21 ENCOUNTER — APPOINTMENT (OUTPATIENT)
Dept: ORTHOPEDIC SURGERY | Facility: CLINIC | Age: 83
End: 2023-12-21
Payer: MEDICAID

## 2023-12-21 PROCEDURE — 99205 OFFICE O/P NEW HI 60 MIN: CPT

## 2023-12-21 RX ORDER — OXYCODONE 5 MG/1
5 TABLET ORAL EVERY 6 HOURS
Qty: 40 | Refills: 0 | Status: COMPLETED | COMMUNITY
Start: 2023-12-06 | End: 2023-12-21

## 2023-12-21 RX ORDER — OXYCODONE 5 MG/1
5 TABLET ORAL
Qty: 80 | Refills: 0 | Status: ACTIVE | COMMUNITY
Start: 2023-12-21 | End: 1900-01-01

## 2023-12-27 ENCOUNTER — NON-APPOINTMENT (OUTPATIENT)
Age: 83
End: 2023-12-27

## 2023-12-27 ENCOUNTER — APPOINTMENT (OUTPATIENT)
Dept: HEMATOLOGY ONCOLOGY | Facility: CLINIC | Age: 83
End: 2023-12-27
Payer: MEDICAID

## 2023-12-27 VITALS
WEIGHT: 124.78 LBS | TEMPERATURE: 98.1 F | BODY MASS INDEX: 22.82 KG/M2 | DIASTOLIC BLOOD PRESSURE: 64 MMHG | SYSTOLIC BLOOD PRESSURE: 104 MMHG | RESPIRATION RATE: 16 BRPM | OXYGEN SATURATION: 100 % | HEART RATE: 87 BPM

## 2023-12-27 PROCEDURE — 99214 OFFICE O/P EST MOD 30 MIN: CPT

## 2023-12-27 PROCEDURE — 77290 THER RAD SIMULAJ FIELD CPLX: CPT | Mod: 26

## 2023-12-27 PROCEDURE — 77263 THER RADIOLOGY TX PLNG CPLX: CPT

## 2023-12-27 PROCEDURE — 77332 RADIATION TREATMENT AID(S): CPT | Mod: 26

## 2023-12-27 NOTE — HISTORY OF PRESENT ILLNESS
[de-identified] : Mr. Lucas Cordova is an 83 year old male   01/2019 diagnosed stage IIIB ([id number]) adenocarcinoma of the prostate, Fred score of 3+4=7, pre-treatment PSA of PSA of 35.95 ng/mL 02/06/2019 CT abdomen and pelvis showed prostate enhancement with extraprostatic extension. Bone scan showed GI.   who is status post radiation therapy from 5/1/2020 to 6/10/2020, total dose of 7,000 cGy.   02/2019 to 01/2021 two-year ADT treatment  8/11/21 PSA <0.1 ng/mL.  He has lost his follow up, was seen by Dr. Franco and referred for further evaluation. He was ordered for PSMA PET CT.   developed left sided shoulder, hip and knee pain about 8 days ago.  Could not walk because of joint pain. Continues to note discomfort with urination and trouble with urinary stream. Nocturia 3 to 4 times. Denies burning, dysuria and gross hematuria. He has difficulty urination. Urine flow is near normal.   12/5/23 PSA 1009ng/ml  [de-identified] : prostate adenocarcinoma  [de-identified] : 12/10/23 CT abdomen and pelvis showed no abnormalities in the chest except nonspecific 4mm right middle lobe nodule and abdomen/pelvis.   Bone scan showed scattered foci of increased activity can be seen in the parietal bone, right scapula, ribs, spine, left humerus and in the pelvis notably the left acetabulum and left iliac bone. Focus localizing to the lesser trochanter of the left femur related to the lytic lesion with cortical thinning with no cortical break. MRI left hip and pelvis showed acute pathological fracture in left acetabulum. diffuse osteolytic lesions consistent with metastasis.  MRI C-, T- and L spine showed diffuse osseous metastasis mild ventral canal epidural disease T8 and T12.  He was seen by Dr. Bonilla, OncoOrthopedic who recommended radiation and hormone therapy without consideration. Reports left sided shoulder, hip and knee pain about 3 weeks.  Could not walk because of joint pain. Nocturia 3 to 4 times. Denies burning, dysuria and gross hematuria. He has difficulty urination.

## 2023-12-27 NOTE — REVIEW OF SYSTEMS
Alert to self only.  Pt restless in the evening. Frequently leaving room and expressing they want to leave. Asked for the number of the police because they feel they are being held against their will. Gave prn PO Zyprexa- little effect. Pt attempted to elope in the evening. Code 21 called. At end of code 21- gave additional prn zyprexa, pt more cooperative and was walked back to room by staff. Denied pain. Up independently in room- door alarm on. Regular diet-tolerating. Discharge pending.   [Shortness Of Breath] : no shortness of breath [Wheezing] : no wheezing [SOB on Exertion] : no shortness of breath during exertion [Joint Stiffness] : no joint stiffness [Muscle Weakness] : no muscle weakness [FreeTextEntry9] : left knee, hip and shoulder pain

## 2023-12-27 NOTE — ASSESSMENT
[FreeTextEntry1] : Alexia Berry is an 83 years old male with diagnosed stage IIIb prostate cancer in Jan 2019, s/p combined 2 years ADT and radiation with Dr. Franco. PSA lin was less than 0.1 in August 2021. Lost his follow up, p/w acute left knee, hip and shoulder pain with restriction of ROM. 12/5/23 PSA 1009.   His CT c/a/p, bone scan and MRI spine showed multiple bone lesions but no soft tissue disease. He is frail to start on  triplet (ADT, docetaxel and ARSI) for high volume but is offered doublet (ADT and abiraterone/prednisone) given no cardiac history and uncontrollable hypertension. He was seen by Dr. Bonilla and was not offered surgery at this time. He is on oxycodon 5mg every 6hrs for pain control with stool softener support. He will receive radiation and His bicalutamide was started in the middle of December and lupron is scheduled soon. He will need DEXA scan and start calcium and vit D.

## 2023-12-29 ENCOUNTER — APPOINTMENT (OUTPATIENT)
Dept: INFUSION THERAPY | Facility: HOSPITAL | Age: 83
End: 2023-12-29

## 2023-12-29 PROCEDURE — 77307 TELETHX ISODOSE PLAN CPLX: CPT | Mod: 26

## 2023-12-29 PROCEDURE — 77334 RADIATION TREATMENT AID(S): CPT | Mod: 26

## 2024-01-02 ENCOUNTER — NON-APPOINTMENT (OUTPATIENT)
Age: 84
End: 2024-01-02

## 2024-01-02 DIAGNOSIS — Z51.11 ENCOUNTER FOR ANTINEOPLASTIC CHEMOTHERAPY: ICD-10-CM

## 2024-01-04 ENCOUNTER — NON-APPOINTMENT (OUTPATIENT)
Age: 84
End: 2024-01-04

## 2024-01-04 NOTE — VITALS
[Maximal Pain Intensity: 7/10] : 7/10 [60: Requires occasional assistance, but is able to care for most of his/her needs] : 60: Requires occasional assistance, but is able to care for most of his/her needs [Maximal Pain Intensity: 8/10] : 8/10 [Least Pain Intensity: 0/10] : 0/10 [50: Requires considerable assistance and frequent medical care.] : 50: Requires considerable assistance and frequent medical care. [Date: ____________] : Patient's last distress assessment performed on [unfilled]. [0 - No Distress] : Distress Level: 0

## 2024-01-04 NOTE — DISEASE MANAGEMENT
[3] : T3 [b] : b [0] : M0 [>20] : >20 ng/mL [] : Patient had a bone scan [Clinical] : TNM Stage: c [IIIB] : IIIB [Radiation Therapy] : Radiation Therapy [EBRT] : EBRT [Treatment with androgen ablation] : Treatment with androgen ablation [TotalCores] : 12 [TotalPositiveCores] : 4 [MaxCoreInvolvement] : 80 [TTNM] : 3b [NTNM] : 0 [MTNM] : 0 [de-identified] : 800cGy [de-identified] : 2000cGy [de-identified] : Left Hip [RadiationCompletedDate] : 06/20 [EBRTDose] : 7000cGy [EBRTFractions] : 28 [ADTStartedDate] : 02/19 [ADTDuration] : 2 years [ADTCompletedDate] : 02/21 [IV] : IV

## 2024-01-04 NOTE — REVIEW OF SYSTEMS
[IPSS Score (0-40): ___] : IPSS score: [unfilled] [EPIC-CP Score (0-60): ___] : EPIC-CP score: [unfilled] [Hot Flashes] : hot flashes [Negative] : Allergic/Immunologic [Anal Pain: Grade 0] : Anal Pain: Grade 0 [Constipation: Grade 0] : Constipation: Grade 0 [Diarrhea: Grade 0] : Diarrhea: Grade 0 [Rectal Pain: Grade 0] : Rectal Pain: Grade 0 [Ejaculation Disorder: Grade 1 - Diminished ejaculation] : Ejaculation Disorder: Grade 1 - Diminished ejaculation  [Erectile Dysfunction: Grade 1 - Decrease in erectile function (frequency or rigidity of erections) but intervention not indicated (e.g., medication or use of mechanical device, penile pump)] : Erectile Dysfunction: Grade 1 - Decrease in erectile function (frequency or rigidity of erections) but intervention not indicated (e.g., medication or use of mechanical device, penile pump) [FreeTextEntry7] : Occasional constipation [FreeTextEntry9] : Occasional  left knee and left shoulder pain since 8 days ago.. [de-identified] : Occasional  headache relives with Tylenol. [Hematuria: Grade 0] : Hematuria: Grade 0 [Urinary Incontinence: Grade 1 - Occasional (e.g., with coughing, sneezing, etc.), pads not indicated] : Urinary Incontinence: Grade 1 - Occasional (e.g., with coughing, sneezing, etc.), pads not indicated [Urinary Retention: Grade 1 - Urinary, suprapubic or intermittent catheter placement not indicated; able to void with some residual] : Urinary Retention: Grade 1 - Urinary, suprapubic or intermittent catheter placement not indicated; able to void with some residual [Urinary Tract Pain: Grade 0] : Urinary Tract Pain: Grade 0 [Urinary Urgency: Grade 1 - Present] : Urinary Urgency: Grade 1 - Present [Urinary Frequency: Grade 1 - Present] : Urinary Frequency: Grade 1 - Present

## 2024-01-04 NOTE — HISTORY OF PRESENT ILLNESS
[FreeTextEntry1] : Mr. Lucas Cordova is an 83 year old male with a history of prognostic stage IIIB adenocarcinoma of the prostate, Carrol score of 3+4=7, pre-treatment PSA of PSA of 35.95 ng/mL, who received radiation therapy from 5/1/2020 to 6/10/2020 with ADT.  He started ADT in Feb. 2019 and was given the last Lupron injection in 7/2020 (6 month) which was then discontinued for anemia.  He was last seen on 8/19/21, when he was evaluated for routine follow up, at which time he was feeling well and the PSA was undetectable,   He just returned from [facility] last weekend. He reports that he started to have left shoulder and left hi/lower back pain a few months ago, and the pain has become progressively more severe. The pain is exacerbated by movement and weight-bearing. He reports no pain while sitting and not moving. He denies numbness or weakness of the extremities. He has been taking Tylenol with little relief. He brought a report back from [facility] showing a PSA > 100. He continues to have urinary frequency and nocturia. He has no dysuria, hematuria or incontinence. The bowel movements are regular, and there is no rectal pain, bleeding or discharge.   Alexia Berry is an 83 years old male with diagnosed stage IIIb prostate cancer in Jan 2019, s/p combined 2 years ADT and radiation with Dr. Franco. PSA lin was less than 0.1 in August 2021. Lost his follow up, now p/w acute left knee, hip and shoulder pain with restriction of ROM. 12/5/23 PSA 1009ng/ml. He is here now receiving radiation therapy to his left hip.  On 12/10/23 had CT abdomen and pelvis showed no abnormalities in the chest except nonspecific 4mm right middle lobe nodule and abdomen/pelvis. Bone scan showed scattered foci of increased activity can be seen in the parietal bone, right scapula, ribs, spine, left humerus and in the pelvis notably the left acetabulum and left iliac bone. Focus localizing to the lesser trochanter of the left femur related to the lytic lesion with cortical thinning with no cortical break. MRI left hip and pelvis showed acute pathological fracture in left acetabulum. diffuse osteolytic lesions consistent with metastasis. On 12/10/2023 MRI C-, T- and L spine showed diffuse osseous metastasis mild ventral canal epidural disease T8 and T12.  The patient initiated  yesterday 1/5fx . She completes 2/5fx today, He is felling well.

## 2024-01-04 NOTE — REASON FOR VISIT
[Family Member] : family member [Routine On-Treatment] : a routine on-treatment visit for [Bone Metastasis] : bone metastasis [Prostate Cancer] : prostate cancer

## 2024-01-08 ENCOUNTER — NON-APPOINTMENT (OUTPATIENT)
Age: 84
End: 2024-01-08

## 2024-01-09 PROCEDURE — 77427 RADIATION TX MANAGEMENT X5: CPT

## 2024-01-11 DIAGNOSIS — C79.51 SECONDARY MALIGNANT NEOPLASM OF BONE: ICD-10-CM

## 2024-01-12 ENCOUNTER — OUTPATIENT (OUTPATIENT)
Dept: OUTPATIENT SERVICES | Facility: HOSPITAL | Age: 84
LOS: 1 days | End: 2024-01-12
Payer: MEDICAID

## 2024-01-12 ENCOUNTER — APPOINTMENT (OUTPATIENT)
Dept: RADIOLOGY | Facility: IMAGING CENTER | Age: 84
End: 2024-01-12
Payer: MEDICAID

## 2024-01-12 DIAGNOSIS — C61 MALIGNANT NEOPLASM OF PROSTATE: ICD-10-CM

## 2024-01-12 DIAGNOSIS — Z98.890 OTHER SPECIFIED POSTPROCEDURAL STATES: Chronic | ICD-10-CM

## 2024-01-12 PROCEDURE — 77080 DXA BONE DENSITY AXIAL: CPT | Mod: 26

## 2024-01-12 PROCEDURE — 77080 DXA BONE DENSITY AXIAL: CPT

## 2024-01-15 ENCOUNTER — NON-APPOINTMENT (OUTPATIENT)
Age: 84
End: 2024-01-15

## 2024-01-17 ENCOUNTER — NON-APPOINTMENT (OUTPATIENT)
Age: 84
End: 2024-01-17

## 2024-01-20 NOTE — DISEASE MANAGEMENT
[TTNM] : 3b [NTNM] : 0 [MTNM] : 0 [de-identified] : 800cGy [de-identified] : 2000cGy [de-identified] : Left hip

## 2024-01-20 NOTE — HISTORY OF PRESENT ILLNESS
[FreeTextEntry1] : Mr. Lucas Cordova underwent definitive treatment for high risk prostate cancer in 2021. He now has symptomatic bone metastases to left hip. He is receiving a course of palliative radiation therapy to the left hip in 5 fractions.  He presents today for an OTV, completed fraction 3/5 today.

## 2024-01-26 ENCOUNTER — APPOINTMENT (OUTPATIENT)
Dept: ORTHOPEDIC SURGERY | Facility: CLINIC | Age: 84
End: 2024-01-26
Payer: MEDICAID

## 2024-01-26 PROCEDURE — 72190 X-RAY EXAM OF PELVIS: CPT

## 2024-01-26 PROCEDURE — 99214 OFFICE O/P EST MOD 30 MIN: CPT

## 2024-01-31 ENCOUNTER — RESULT REVIEW (OUTPATIENT)
Age: 84
End: 2024-01-31

## 2024-01-31 ENCOUNTER — APPOINTMENT (OUTPATIENT)
Dept: HEMATOLOGY ONCOLOGY | Facility: CLINIC | Age: 84
End: 2024-01-31
Payer: MEDICAID

## 2024-01-31 VITALS
BODY MASS INDEX: 22.82 KG/M2 | HEIGHT: 62 IN | HEART RATE: 94 BPM | RESPIRATION RATE: 16 BRPM | OXYGEN SATURATION: 97 % | SYSTOLIC BLOOD PRESSURE: 108 MMHG | TEMPERATURE: 98.5 F | DIASTOLIC BLOOD PRESSURE: 62 MMHG | WEIGHT: 124 LBS

## 2024-01-31 DIAGNOSIS — R11.0 NAUSEA: ICD-10-CM

## 2024-01-31 LAB
BASOPHILS # BLD AUTO: 0.03 K/UL — SIGNIFICANT CHANGE UP (ref 0–0.2)
BASOPHILS NFR BLD AUTO: 0.6 % — SIGNIFICANT CHANGE UP (ref 0–2)
EOSINOPHIL # BLD AUTO: 0.09 K/UL — SIGNIFICANT CHANGE UP (ref 0–0.5)
EOSINOPHIL NFR BLD AUTO: 1.9 % — SIGNIFICANT CHANGE UP (ref 0–6)
HCT VFR BLD CALC: 30.4 % — LOW (ref 39–50)
HGB BLD-MCNC: 10.5 G/DL — LOW (ref 13–17)
IMM GRANULOCYTES NFR BLD AUTO: 0.9 % — SIGNIFICANT CHANGE UP (ref 0–0.9)
LYMPHOCYTES # BLD AUTO: 0.79 K/UL — LOW (ref 1–3.3)
LYMPHOCYTES # BLD AUTO: 16.9 % — SIGNIFICANT CHANGE UP (ref 13–44)
MCHC RBC-ENTMCNC: 29.4 PG — SIGNIFICANT CHANGE UP (ref 27–34)
MCHC RBC-ENTMCNC: 34.5 G/DL — SIGNIFICANT CHANGE UP (ref 32–36)
MCV RBC AUTO: 85.2 FL — SIGNIFICANT CHANGE UP (ref 80–100)
MONOCYTES # BLD AUTO: 0.35 K/UL — SIGNIFICANT CHANGE UP (ref 0–0.9)
MONOCYTES NFR BLD AUTO: 7.5 % — SIGNIFICANT CHANGE UP (ref 2–14)
NEUTROPHILS # BLD AUTO: 3.37 K/UL — SIGNIFICANT CHANGE UP (ref 1.8–7.4)
NEUTROPHILS NFR BLD AUTO: 72.2 % — SIGNIFICANT CHANGE UP (ref 43–77)
NRBC # BLD: 0 /100 WBCS — SIGNIFICANT CHANGE UP (ref 0–0)
PLATELET # BLD AUTO: 237 K/UL — SIGNIFICANT CHANGE UP (ref 150–400)
RBC # BLD: 3.57 M/UL — LOW (ref 4.2–5.8)
RBC # FLD: 13.6 % — SIGNIFICANT CHANGE UP (ref 10.3–14.5)
WBC # BLD: 4.67 K/UL — SIGNIFICANT CHANGE UP (ref 3.8–10.5)
WBC # FLD AUTO: 4.67 K/UL — SIGNIFICANT CHANGE UP (ref 3.8–10.5)

## 2024-01-31 PROCEDURE — 99214 OFFICE O/P EST MOD 30 MIN: CPT

## 2024-01-31 RX ORDER — CARBINOXAMINE MALEATE 4 MG/1
4 TABLET ORAL
Refills: 0 | Status: DISCONTINUED | COMMUNITY
End: 2024-01-31

## 2024-01-31 RX ORDER — BICALUTAMIDE 50 MG/1
50 TABLET ORAL DAILY
Qty: 30 | Refills: 0 | Status: DISCONTINUED | COMMUNITY
Start: 2023-12-08 | End: 2024-01-31

## 2024-01-31 RX ORDER — ONDANSETRON 4 MG/1
4 TABLET ORAL EVERY 8 HOURS
Qty: 30 | Refills: 1 | Status: ACTIVE | COMMUNITY
Start: 2024-01-31 | End: 1900-01-01

## 2024-02-02 LAB — PSA SERPL-MCNC: 71.1 NG/ML

## 2024-02-02 RX ORDER — METFORMIN HYDROCHLORIDE 1000 MG/1
1000 TABLET, COATED ORAL
Refills: 0 | Status: ACTIVE | COMMUNITY
Start: 2024-02-02

## 2024-02-02 RX ORDER — DAPAGLIFLOZIN 5 MG/1
5 TABLET, FILM COATED ORAL
Refills: 0 | Status: ACTIVE | COMMUNITY
Start: 2024-02-02

## 2024-02-02 NOTE — REVIEW OF SYSTEMS
[Wheezing] : no wheezing [SOB on Exertion] : no shortness of breath during exertion [Diarrhea: Grade 0] : Diarrhea: Grade 0 [Joint Pain] : joint pain [Joint Stiffness] : no joint stiffness [Muscle Weakness] : no muscle weakness [Negative] : Allergic/Immunologic [Fever] : no fever [Chills] : no chills [Fatigue] : fatigue [Chest Pain] : no chest pain [Palpitations] : no palpitations [Lower Ext Edema] : no lower extremity edema [Shortness Of Breath] : no shortness of breath [Cough] : no cough [Abdominal Pain] : no abdominal pain [Vomiting] : no vomiting [Constipation] : no constipation [Dysuria] : no dysuria [Incontinence] : incontinence [Dizziness] : no dizziness [Difficulty Walking] : difficulty walking [FreeTextEntry7] : +nausea  [FreeTextEntry9] : left knee, hip and shoulder pain

## 2024-02-02 NOTE — ASSESSMENT
[FreeTextEntry1] :  83 years old male with diagnosed stage IIIb prostate cancer in Jan 2019, s/p combined 2 years ADT and radiation with Dr. Franco. PSA lin was less than 0.1 in August 2021. Lost his follow up, p/w acute left knee, hip and shoulder pain with restriction of ROM. 12/5/23 PSA 1009. 12/2023 CT c/a/p, bone scan and MRI spine showed multiple bone lesions but no soft tissue disease. Lytic lesion involving L acetabulum causing pain c/f pathologic fracture, following with Ortho and Rad Onc s/p 2000cGy to L hip in Dec 2023 - Jan 2024 with Dr. Franco. Pt is to a candidate for  triplet  therapy given performance/clinical status(ADT, docetaxel and ARSI) for high volume but is offered doublet (ADT and abiraterone/prednisone) given no cardiac history and uncontrollable hypertension. Started on bicalutamide and received Eligard on 12/29/23. Started abiraterone and prednisone Jan 2024.   [facility]  - continue Eligard [id number], last 12/29, next 3/29  - started abiraterone and prednisone Jan 2024, continue  - reviewed abiraterone AEs with patient, including but not limited to: fatigue, leg edema, hypertension, hypernatremia, hypokalemia, and liver damage - f/u blood work from today  - DEXA scan 1/12/24: osteopenia, FRAX Score: Major osteoporotic 5.3%, Hip Fracture 1.7%, prolia not indicated - continue Ca + Vit D  - L acetabulum osseous lesion causing pain c/f pathologic fracture, following with Ortho and Rad Onc s/p 2000cGy to L hip in Dec 2023 - Jan 2024 with Dr. Franco; continue to follow with Ortho and Rad Onc   - Rx zofran for occasional nausea   Instructed to contact our office with any new/worsening symptoms. Patient educated regarding plan of care, all questions/concerns addressed to the best of my abilities and patient's apparent satisfaction. RTC 3 weeks

## 2024-02-02 NOTE — HISTORY OF PRESENT ILLNESS
[Disease: _____________________] : Disease: [unfilled] [T: ___] : T[unfilled] [N: ___] : N[unfilled] [M: ___] : M[unfilled] [de-identified] : Mr. Lucas Cordova is an 83 year old male   01/2019 diagnosed stage IIIB ([id number]) adenocarcinoma of the prostate, [place] score of 3+4=7, pre-treatment PSA of PSA of 35.95 ng/mL 02/06/2019 CT abdomen and pelvis showed prostate enhancement with extraprostatic extension. Bone scan showed GI.   who is status post radiation therapy from 5/1/2020 to 6/10/2020, total dose of 7,000 cGy.   02/2019 to 01/2021 two-year ADT treatment  8/11/21 PSA <0.1 ng/mL.  He has lost his follow up, was seen by Dr. Franco and referred for further evaluation. He was ordered for PSMA PET CT.   developed left sided shoulder, hip and knee pain about 8 days ago.  Could not walk because of joint pain. Continues to note discomfort with urination and trouble with urinary stream. Nocturia 3 to 4 times. Denies burning, dysuria and gross hematuria. He has difficulty urination. Urine flow is near normal.   12/5/23 PSA 1009ng/ml  12/10/23 CT abdomen and pelvis showed no abnormalities in the chest except nonspecific 4mm right middle lobe nodule and abdomen/pelvis.   Bone scan showed scattered foci of increased activity can be seen in the parietal bone, right scapula, ribs, spine, left humerus and in the pelvis notably the left acetabulum and left iliac bone. Focus localizing to the lesser trochanter of the left femur related to the lytic lesion with cortical thinning with no cortical break. MRI left hip and pelvis showed acute pathological fracture in left acetabulum. diffuse osteolytic lesions consistent with metastasis.  MRI C-, T- and L spine showed diffuse osseous metastasis mild ventral canal epidural disease T8 and T12.  He was seen by Dr. Bonilla, [facility] who recommended radiation and hormone therapy without consideration. Reports left sided shoulder, hip and knee pain about 3 weeks.  Could not walk because of joint pain. Nocturia 3 to 4 times. Denies burning, dysuria and gross hematuria. He has difficulty urination.  [de-identified] : prostate adenocarcinoma  [de-identified] : 1/29/24: Patient started abiraterone on 1/11, takes it in the morning on an empty stomach, followed by prednisone one hour later with food. He also started taking Ca + Vit D. Patient is sitting in a wheelchair. He feels nauseous and weak, has decreased appetite, declined to be weighed today. He is fatigued but does feel re-energized after resting. Has pain on L hip, some days good, some days bad, does not like to take medication, only takes tylenol about once a week. Reports itching, denies rash. Has neuropathy in L foot, this is not new. Reportedly, glucose is well controlled. Pt walks with walker, he was recommended physical therapy but was in pain so he declined.. Recently saw Ortho. Reports nocturia 4-5, wears diapers.

## 2024-02-02 NOTE — REASON FOR VISIT
[Follow-Up Visit] : a follow-up [Family Member] : family member [Ad Hoc ] : provided by an ad hoc  [FreeTextEntry2] : [facility]  [Interpreters_Relationshiptopatient] : daughter in law  [TWNoteComboBox1] : [place]

## 2024-02-02 NOTE — PHYSICAL EXAM
[Ambulatory and capable of all self care but unable to carry out any work activities] : Status 2- Ambulatory and capable of all self care but unable to carry out any work activities. Up and about more than 50% of waking hours [Normal] : affect appropriate [de-identified] : sitting in wheelchair  [de-identified] : anicteric  [de-identified] : non tender, FROM  [de-identified] : no edema  [de-identified] : left knee, hip and shoulder pain with restriction of ROM

## 2024-02-05 LAB
ALBUMIN SERPL ELPH-MCNC: 3.8 G/DL
ALP BLD-CCNC: 228 U/L
ALT SERPL-CCNC: 6 U/L
ANION GAP SERPL CALC-SCNC: 12 MMOL/L
AST SERPL-CCNC: 19 U/L
BILIRUB SERPL-MCNC: 0.5 MG/DL
BUN SERPL-MCNC: 27 MG/DL
CALCIUM SERPL-MCNC: 9.4 MG/DL
CHLORIDE SERPL-SCNC: 96 MMOL/L
CO2 SERPL-SCNC: 23 MMOL/L
CREAT SERPL-MCNC: 1.17 MG/DL
EGFR: 62 ML/MIN/1.73M2
GLUCOSE SERPL-MCNC: 161 MG/DL
POTASSIUM SERPL-SCNC: 4.6 MMOL/L
PROT SERPL-MCNC: 6.1 G/DL
SODIUM SERPL-SCNC: 130 MMOL/L

## 2024-02-06 ENCOUNTER — NON-APPOINTMENT (OUTPATIENT)
Age: 84
End: 2024-02-06

## 2024-02-22 ENCOUNTER — NON-APPOINTMENT (OUTPATIENT)
Age: 84
End: 2024-02-22

## 2024-02-22 ENCOUNTER — APPOINTMENT (OUTPATIENT)
Dept: RADIATION ONCOLOGY | Facility: CLINIC | Age: 84
End: 2024-02-22
Payer: MEDICAID

## 2024-02-22 VITALS
HEIGHT: 62 IN | HEART RATE: 70 BPM | TEMPERATURE: 96.98 F | BODY MASS INDEX: 22.82 KG/M2 | DIASTOLIC BLOOD PRESSURE: 56 MMHG | OXYGEN SATURATION: 99 % | SYSTOLIC BLOOD PRESSURE: 91 MMHG | RESPIRATION RATE: 17 BRPM | WEIGHT: 124 LBS

## 2024-02-22 PROCEDURE — 99024 POSTOP FOLLOW-UP VISIT: CPT

## 2024-02-22 NOTE — PHYSICAL EXAM
[] : no respiratory distress [General Appearance - Well Developed] : well developed [Normal] : oriented to person, place and time, the affect was normal, the mood was normal and not anxious [de-identified] : Leg strength intact bilaterally. [Thin] : thin [de-identified] : wheelchair

## 2024-02-22 NOTE — HISTORY OF PRESENT ILLNESS
[FreeTextEntry1] : Mr. Lucas Cordova underwent definitive treatment for high risk prostate cancer in 2021. He returned in 2023 with symptomatic bone metastasis to left hip. He received a course of palliative radiation therapy to the left hip, a dose of 2000 cGy from 1/3/2024 - 1/9/2024.  He presents for a post-treatment evaluation.   He continues to have pain in the left hip area, not much improved since treatment. The pain waxes and wanes. The pain is exacerbated by weight bearing, so he spends a lot of time sitting/lying down. He gets fatigued easily when walking with the walker. His appetite is improved, and the nausea has resolved. He takes 2 oxycodone tablets in the morning each day. He denies having any new pains, no back pain. He continues follow up with Dr. Taylor.  He was initiated on abiraterone and prednisone on 1/11/24. He noted foot/ankle edema about 4 days ago.

## 2024-02-22 NOTE — VITALS
[Maximal Pain Intensity: 6/10] : 6/10 [Least Pain Intensity: 0/10] : 0/10 [Pain Duration: ___] : Pain duration: [unfilled] [Pain Description/Quality: ___] : Pain description/quality: [unfilled] [Pain Location: ___] : Pain Location: [unfilled] [Pain Interferes with ADLs] : Pain interferes with activities of daily living. [Opioid] : opioid [60: Requires occasional assistance, but is able to care for most of his/her needs] : 60: Requires occasional assistance, but is able to care for most of his/her needs

## 2024-02-22 NOTE — DISEASE MANAGEMENT
[TotalCores] : 12 [MaxCoreInvolvement] : 80 [TotalPositiveCores] : 4 [RadiationCompletedDate] : 06/20 [EBRTDose] : 7000cGy [ADTStartedDate] : 02/19 [EBRTFractions] : 28 [ADTCompletedDate] : 02/21 [ADTDuration] : 2 years [NTNM] : 0 [MTNM] : 0 [TTNM] : 3b

## 2024-02-23 ENCOUNTER — NON-APPOINTMENT (OUTPATIENT)
Age: 84
End: 2024-02-23

## 2024-02-29 ENCOUNTER — APPOINTMENT (OUTPATIENT)
Dept: RADIOLOGY | Facility: IMAGING CENTER | Age: 84
End: 2024-02-29
Payer: MEDICAID

## 2024-02-29 ENCOUNTER — APPOINTMENT (OUTPATIENT)
Dept: ULTRASOUND IMAGING | Facility: IMAGING CENTER | Age: 84
End: 2024-02-29
Payer: MEDICAID

## 2024-02-29 ENCOUNTER — OUTPATIENT (OUTPATIENT)
Dept: OUTPATIENT SERVICES | Facility: HOSPITAL | Age: 84
LOS: 1 days | End: 2024-02-29
Payer: MEDICAID

## 2024-02-29 DIAGNOSIS — Z98.890 OTHER SPECIFIED POSTPROCEDURAL STATES: Chronic | ICD-10-CM

## 2024-02-29 DIAGNOSIS — N18.31 CHRONIC KIDNEY DISEASE, STAGE 3A: ICD-10-CM

## 2024-02-29 PROCEDURE — 76775 US EXAM ABDO BACK WALL LIM: CPT | Mod: 26

## 2024-02-29 PROCEDURE — 76775 US EXAM ABDO BACK WALL LIM: CPT

## 2024-02-29 PROCEDURE — 71046 X-RAY EXAM CHEST 2 VIEWS: CPT | Mod: 26

## 2024-02-29 PROCEDURE — 71046 X-RAY EXAM CHEST 2 VIEWS: CPT

## 2024-03-06 ENCOUNTER — NON-APPOINTMENT (OUTPATIENT)
Age: 84
End: 2024-03-06

## 2024-03-06 ENCOUNTER — RESULT REVIEW (OUTPATIENT)
Age: 84
End: 2024-03-06

## 2024-03-06 ENCOUNTER — APPOINTMENT (OUTPATIENT)
Dept: HEMATOLOGY ONCOLOGY | Facility: CLINIC | Age: 84
End: 2024-03-06
Payer: MEDICAID

## 2024-03-06 ENCOUNTER — OUTPATIENT (OUTPATIENT)
Dept: OUTPATIENT SERVICES | Facility: HOSPITAL | Age: 84
LOS: 1 days | Discharge: HOME | End: 2024-03-06

## 2024-03-06 VITALS
HEART RATE: 97 BPM | RESPIRATION RATE: 18 BRPM | TEMPERATURE: 208.94 F | WEIGHT: 122.55 LBS | DIASTOLIC BLOOD PRESSURE: 60 MMHG | BODY MASS INDEX: 22.42 KG/M2 | SYSTOLIC BLOOD PRESSURE: 90 MMHG | OXYGEN SATURATION: 98 %

## 2024-03-06 DIAGNOSIS — C61 MALIGNANT NEOPLASM OF PROSTATE: ICD-10-CM

## 2024-03-06 DIAGNOSIS — Z98.890 OTHER SPECIFIED POSTPROCEDURAL STATES: Chronic | ICD-10-CM

## 2024-03-06 LAB
BASOPHILS # BLD AUTO: 0.02 K/UL — SIGNIFICANT CHANGE UP (ref 0–0.2)
BASOPHILS NFR BLD AUTO: 0.4 % — SIGNIFICANT CHANGE UP (ref 0–2)
EOSINOPHIL # BLD AUTO: 0.11 K/UL — SIGNIFICANT CHANGE UP (ref 0–0.5)
EOSINOPHIL NFR BLD AUTO: 2.1 % — SIGNIFICANT CHANGE UP (ref 0–6)
HCT VFR BLD CALC: 35 % — LOW (ref 39–50)
HGB BLD-MCNC: 12.1 G/DL — LOW (ref 13–17)
IMM GRANULOCYTES NFR BLD AUTO: 0.2 % — SIGNIFICANT CHANGE UP (ref 0–0.9)
LYMPHOCYTES # BLD AUTO: 1.64 K/UL — SIGNIFICANT CHANGE UP (ref 1–3.3)
LYMPHOCYTES # BLD AUTO: 31.8 % — SIGNIFICANT CHANGE UP (ref 13–44)
MCHC RBC-ENTMCNC: 29.7 PG — SIGNIFICANT CHANGE UP (ref 27–34)
MCHC RBC-ENTMCNC: 34.6 G/DL — SIGNIFICANT CHANGE UP (ref 32–36)
MCV RBC AUTO: 86 FL — SIGNIFICANT CHANGE UP (ref 80–100)
MONOCYTES # BLD AUTO: 0.41 K/UL — SIGNIFICANT CHANGE UP (ref 0–0.9)
MONOCYTES NFR BLD AUTO: 8 % — SIGNIFICANT CHANGE UP (ref 2–14)
NEUTROPHILS # BLD AUTO: 2.96 K/UL — SIGNIFICANT CHANGE UP (ref 1.8–7.4)
NEUTROPHILS NFR BLD AUTO: 57.5 % — SIGNIFICANT CHANGE UP (ref 43–77)
NRBC # BLD: 0 /100 WBCS — SIGNIFICANT CHANGE UP (ref 0–0)
PLATELET # BLD AUTO: 151 K/UL — SIGNIFICANT CHANGE UP (ref 150–400)
RBC # BLD: 4.07 M/UL — LOW (ref 4.2–5.8)
RBC # FLD: 14.9 % — HIGH (ref 10.3–14.5)
WBC # BLD: 5.15 K/UL — SIGNIFICANT CHANGE UP (ref 3.8–10.5)
WBC # FLD AUTO: 5.15 K/UL — SIGNIFICANT CHANGE UP (ref 3.8–10.5)

## 2024-03-06 PROCEDURE — 99213 OFFICE O/P EST LOW 20 MIN: CPT

## 2024-03-06 PROCEDURE — G2211 COMPLEX E/M VISIT ADD ON: CPT | Mod: NC,1L

## 2024-03-06 NOTE — PHYSICAL EXAM
[de-identified] : sitting in wheelchair  [de-identified] : anicteric  [de-identified] : no edema  [de-identified] : non tender, FROM  [de-identified] : left knee, hip and shoulder pain with restriction of ROM

## 2024-03-06 NOTE — REASON FOR VISIT
[FreeTextEntry2] : [facility]  [Interpreters_Relationshiptopatient] : daughter in law  [TWNoteComboBox1] : [place]

## 2024-03-06 NOTE — REVIEW OF SYSTEMS
[Chills] : no chills [Fever] : no fever [Chest Pain] : no chest pain [Palpitations] : no palpitations [Lower Ext Edema] : no lower extremity edema [Shortness Of Breath] : no shortness of breath [Cough] : no cough [Abdominal Pain] : no abdominal pain [Vomiting] : no vomiting [Constipation] : no constipation [Dysuria] : no dysuria [Dizziness] : no dizziness [FreeTextEntry9] : left knee, hip and shoulder pain [FreeTextEntry7] : +nausea

## 2024-03-06 NOTE — HISTORY OF PRESENT ILLNESS
[de-identified] : Mr. Lucas Cordova is an 83 year old male   01/2019 diagnosed stage IIIB ([id number]) adenocarcinoma of the prostate, [place] score of 3+4=7, pre-treatment PSA of PSA of 35.95 ng/mL 02/06/2019 CT abdomen and pelvis showed prostate enhancement with extraprostatic extension. Bone scan showed GI.   who is status post radiation therapy from 5/1/2020 to 6/10/2020, total dose of 7,000 cGy.   02/2019 to 01/2021 two-year ADT treatment  8/11/21 PSA <0.1 ng/mL.  He has lost his follow up, was seen by Dr. Franco and referred for further evaluation. He was ordered for PSMA PET CT.   developed left sided shoulder, hip and knee pain about 8 days ago.  Could not walk because of joint pain. Continues to note discomfort with urination and trouble with urinary stream. Nocturia 3 to 4 times. Denies burning, dysuria and gross hematuria. He has difficulty urination. Urine flow is near normal.   12/5/23 PSA 1009ng/ml  12/10/23 CT abdomen and pelvis showed no abnormalities in the chest except nonspecific 4mm right middle lobe nodule and abdomen/pelvis.   Bone scan showed scattered foci of increased activity can be seen in the parietal bone, right scapula, ribs, spine, left humerus and in the pelvis notably the left acetabulum and left iliac bone. Focus localizing to the lesser trochanter of the left femur related to the lytic lesion with cortical thinning with no cortical break. MRI left hip and pelvis showed acute pathological fracture in left acetabulum. diffuse osteolytic lesions consistent with metastasis.  MRI C-, T- and L spine showed diffuse osseous metastasis mild ventral canal epidural disease T8 and T12.  He was seen by Dr. Bonilla, [facility] who recommended radiation and hormone therapy without consideration. Reports left sided shoulder, hip and knee pain about 3 weeks.  Could not walk because of joint pain. Nocturia 3 to 4 times. Denies burning, dysuria and gross hematuria. He has difficulty urination.  [de-identified] : prostate adenocarcinoma  [de-identified] : 1/29/24: Patient started abiraterone on 1/11, takes it in the morning on an empty stomach, followed by prednisone one hour later with food. He also started taking Ca + Vit D. Patient is sitting in a wheelchair. He feels nauseous and weak, has decreased appetite, declined to be weighed today. He is fatigued but does feel re-energized after resting. Has pain on L hip, some days good, some days bad, does not like to take medication, only takes tylenol about once a week. Reports itching, denies rash. Has neuropathy in L foot, this is not new. Reportedly, glucose is well controlled. Pt walks with walker, he was recommended physical therapy but was in pain so he declined.. Recently saw Ortho. Reports nocturia 4-5, wears diapers.    3/6/24 reports feeling better, starts more walking with a walker, also has fatigue, denies hot flash/sweating. Nocturia 7. His appetite is better.

## 2024-03-06 NOTE — ASSESSMENT
[FreeTextEntry1] :  83 years old male with diagnosed stage IIIb prostate cancer in Jan 2019, s/p combined 2 years ADT and radiation with Dr. Franco. PSA lin was less than 0.1 in August 2021. Lost his follow up, p/w acute left knee, hip and shoulder pain with restriction of ROM. 12/5/23 PSA 1009. 12/2023 CT c/a/p, bone scan and MRI spine showed multiple bone lesions but no soft tissue disease. Lytic lesion involving L acetabulum causing pain c/f pathologic fracture, following with Ortho and Rad Onc s/p 2000cGy to L hip in Dec 2023 - Jan 2024 with Dr. Franco. Pt is to a candidate for  triplet  therapy given performance/clinical status(ADT, docetaxel and ARSI) for high volume but is offered doublet (ADT and abiraterone/prednisone) given no cardiac history and uncontrollable hypertension. Started on bicalutamide and received Eligard on 12/29/23. Started abiraterone and prednisone Jan 2024.   [facility]  - continue Eligard [id number], last 12/29, next 3/29  - started abiraterone and prednisone Jan 2024, continue  - reviewed abiraterone AEs with patient, including but not limited to: fatigue, leg edema, hypertension, hypernatremia, hypokalemia, and liver damage - f/u blood work from today  - DEXA scan 1/12/24: osteopenia, FRAX Score: Major osteoporotic 5.3%, Hip Fracture 1.7%, prolia not indicated - continue Ca + Vit D  - L acetabulum osseous lesion causing pain c/f pathologic fracture, following with Ortho and Rad Onc s/p 2000cGy to L hip in Dec 2023 - Jan 2024 with Dr. Franco; continue to follow with Ortho and Rad Onc   - Rx zofran for occasional nausea   Instructed to contact our office with any new/worsening symptoms. Patient educated regarding plan of care, all questions/concerns addressed to the best of my abilities and patient's apparent satisfaction. RTC 3 weeks

## 2024-03-07 LAB
ALBUMIN SERPL ELPH-MCNC: 4.3 G/DL
ALP BLD-CCNC: 151 U/L
ALT SERPL-CCNC: 11 U/L
ANION GAP SERPL CALC-SCNC: 14 MMOL/L
AST SERPL-CCNC: 16 U/L
BILIRUB SERPL-MCNC: 0.5 MG/DL
BUN SERPL-MCNC: 32 MG/DL
CALCIUM SERPL-MCNC: 9.8 MG/DL
CHLORIDE SERPL-SCNC: 98 MMOL/L
CO2 SERPL-SCNC: 24 MMOL/L
CREAT SERPL-MCNC: 1.18 MG/DL
EGFR: 61 ML/MIN/1.73M2
GLUCOSE SERPL-MCNC: 136 MG/DL
POTASSIUM SERPL-SCNC: 4.3 MMOL/L
PROT SERPL-MCNC: 6.4 G/DL
PSA SERPL-MCNC: 2.08 NG/ML
SODIUM SERPL-SCNC: 136 MMOL/L
TESTOST SERPL-MCNC: <2.5 NG/DL

## 2024-03-29 ENCOUNTER — RESULT REVIEW (OUTPATIENT)
Age: 84
End: 2024-03-29

## 2024-03-29 ENCOUNTER — APPOINTMENT (OUTPATIENT)
Dept: INFUSION THERAPY | Facility: HOSPITAL | Age: 84
End: 2024-03-29

## 2024-03-29 ENCOUNTER — APPOINTMENT (OUTPATIENT)
Dept: HEMATOLOGY ONCOLOGY | Facility: CLINIC | Age: 84
End: 2024-03-29
Payer: MEDICAID

## 2024-03-29 VITALS
TEMPERATURE: 98 F | BODY MASS INDEX: 22.46 KG/M2 | DIASTOLIC BLOOD PRESSURE: 56 MMHG | WEIGHT: 122.8 LBS | OXYGEN SATURATION: 100 % | RESPIRATION RATE: 16 BRPM | SYSTOLIC BLOOD PRESSURE: 92 MMHG | HEART RATE: 91 BPM

## 2024-03-29 DIAGNOSIS — C79.51 SECONDARY MALIGNANT NEOPLASM OF BONE: ICD-10-CM

## 2024-03-29 LAB
BASOPHILS # BLD AUTO: 0.01 K/UL — SIGNIFICANT CHANGE UP (ref 0–0.2)
BASOPHILS NFR BLD AUTO: 0.2 % — SIGNIFICANT CHANGE UP (ref 0–2)
EOSINOPHIL # BLD AUTO: 0.09 K/UL — SIGNIFICANT CHANGE UP (ref 0–0.5)
EOSINOPHIL NFR BLD AUTO: 2 % — SIGNIFICANT CHANGE UP (ref 0–6)
HCT VFR BLD CALC: 32.1 % — LOW (ref 39–50)
HGB BLD-MCNC: 11.2 G/DL — LOW (ref 13–17)
IMM GRANULOCYTES NFR BLD AUTO: 0.4 % — SIGNIFICANT CHANGE UP (ref 0–0.9)
LYMPHOCYTES # BLD AUTO: 1.16 K/UL — SIGNIFICANT CHANGE UP (ref 1–3.3)
LYMPHOCYTES # BLD AUTO: 25.4 % — SIGNIFICANT CHANGE UP (ref 13–44)
MCHC RBC-ENTMCNC: 29.7 PG — SIGNIFICANT CHANGE UP (ref 27–34)
MCHC RBC-ENTMCNC: 34.9 G/DL — SIGNIFICANT CHANGE UP (ref 32–36)
MCV RBC AUTO: 85.1 FL — SIGNIFICANT CHANGE UP (ref 80–100)
MONOCYTES # BLD AUTO: 0.38 K/UL — SIGNIFICANT CHANGE UP (ref 0–0.9)
MONOCYTES NFR BLD AUTO: 8.3 % — SIGNIFICANT CHANGE UP (ref 2–14)
NEUTROPHILS # BLD AUTO: 2.91 K/UL — SIGNIFICANT CHANGE UP (ref 1.8–7.4)
NEUTROPHILS NFR BLD AUTO: 63.7 % — SIGNIFICANT CHANGE UP (ref 43–77)
NRBC # BLD: 0 /100 WBCS — SIGNIFICANT CHANGE UP (ref 0–0)
PLATELET # BLD AUTO: 128 K/UL — LOW (ref 150–400)
RBC # BLD: 3.77 M/UL — LOW (ref 4.2–5.8)
RBC # FLD: 14.5 % — SIGNIFICANT CHANGE UP (ref 10.3–14.5)
WBC # BLD: 4.57 K/UL — SIGNIFICANT CHANGE UP (ref 3.8–10.5)
WBC # FLD AUTO: 4.57 K/UL — SIGNIFICANT CHANGE UP (ref 3.8–10.5)

## 2024-03-29 PROCEDURE — G2211 COMPLEX E/M VISIT ADD ON: CPT | Mod: NC,1L

## 2024-03-29 PROCEDURE — 99214 OFFICE O/P EST MOD 30 MIN: CPT

## 2024-03-29 NOTE — REASON FOR VISIT
[Follow-Up Visit] : a follow-up [Family Member] : family member [Ad Hoc ] : provided by an ad hoc  [FreeTextEntry2] : [facility]  [Interpreters_Relationshiptopatient] : daughter in law  [TWNoteComboBox1] : [place]

## 2024-03-29 NOTE — PHYSICAL EXAM
[Ambulatory and capable of all self care but unable to carry out any work activities] : Status 2- Ambulatory and capable of all self care but unable to carry out any work activities. Up and about more than 50% of waking hours [de-identified] : left knee, hip and shoulder pain with restriction of ROM [Normal] : full range of motion and no deformities appreciated [de-identified] : sitting in wheelchair  [de-identified] : anicteric  [de-identified] : non tender, FROM  [de-identified] : trace ankle edema

## 2024-03-29 NOTE — REVIEW OF SYSTEMS
[Fatigue] : fatigue [Diarrhea: Grade 0] : Diarrhea: Grade 0 [Incontinence] : incontinence [Joint Pain] : joint pain [Difficulty Walking] : difficulty walking [FreeTextEntry7] : +nausea  [Fever] : no fever [Chills] : no chills [Chest Pain] : no chest pain [Palpitations] : no palpitations [Lower Ext Edema] : lower extremity edema [Shortness Of Breath] : no shortness of breath [Cough] : no cough [Abdominal Pain] : no abdominal pain [Vomiting] : no vomiting [Constipation] : no constipation [Dysuria] : no dysuria [Skin Rash] : no skin rash [Hot Flashes] : no hot flashes [Dizziness] : no dizziness [FreeTextEntry9] : left knee, hip pain

## 2024-03-29 NOTE — HISTORY OF PRESENT ILLNESS
[Disease: _____________________] : Disease: [unfilled] [T: ___] : T[unfilled] [N: ___] : N[unfilled] [M: ___] : M[unfilled] [de-identified] : Mr. Lucas Cordova is an 83 year old male   01/2019 diagnosed stage IIIB ([id number]) adenocarcinoma of the prostate, [place] score of 3+4=7, pre-treatment PSA of PSA of 35.95 ng/mL 02/06/2019 CT abdomen and pelvis showed prostate enhancement with extraprostatic extension. Bone scan showed GI.   who is status post radiation therapy from 5/1/2020 to 6/10/2020, total dose of 7,000 cGy.   02/2019 to 01/2021 two-year ADT treatment  8/11/21 PSA <0.1 ng/mL.  He has lost his follow up, was seen by Dr. Franco and referred for further evaluation. He was ordered for PSMA PET CT.   developed left sided shoulder, hip and knee pain about 8 days ago.  Could not walk because of joint pain. Continues to note discomfort with urination and trouble with urinary stream. Nocturia 3 to 4 times. Denies burning, dysuria and gross hematuria. He has difficulty urination. Urine flow is near normal.   12/5/23 PSA 1009ng/ml  12/10/23 CT abdomen and pelvis showed no abnormalities in the chest except nonspecific 4mm right middle lobe nodule and abdomen/pelvis.   Bone scan showed scattered foci of increased activity can be seen in the parietal bone, right scapula, ribs, spine, left humerus and in the pelvis notably the left acetabulum and left iliac bone. Focus localizing to the lesser trochanter of the left femur related to the lytic lesion with cortical thinning with no cortical break. MRI left hip and pelvis showed acute pathological fracture in left acetabulum. diffuse osteolytic lesions consistent with metastasis.  MRI C-, T- and L spine showed diffuse osseous metastasis mild ventral canal epidural disease T8 and T12.  He was seen by Dr. Bonilla, [facility] who recommended radiation and hormone therapy without consideration. Reports left sided shoulder, hip and knee pain about 3 weeks.  Could not walk because of joint pain. Nocturia 3 to 4 times. Denies burning, dysuria and gross hematuria. He has difficulty urination.   1/29/24: Patient started abiraterone on 1/11, takes it in the morning on an empty stomach, followed by prednisone one hour later with food. He also started taking Ca + Vit D. Patient is sitting in a wheelchair. He feels nauseous and weak, has decreased appetite, declined to be weighed today. He is fatigued but does feel re-energized after resting. Has pain on L hip, some days good, some days bad, does not like to take medication, only takes tylenol about once a week. Reports itching, denies rash. Has neuropathy in L foot, this is not new. Reportedly, glucose is well controlled. Pt walks with walker, he was recommended physical therapy but was in pain so he declined.. Recently saw Ortho. Reports nocturia 4-5, wears diapers.    3/6/24 reports feeling better, starts more walking with a walker, also has fatigue, denies hot flash/sweating. Nocturia 7. His appetite is better.  [de-identified] : prostate adenocarcinoma  [de-identified] : 3/29/24: BP initially 86/51, repeated 92/56, is BP runs low, this is his baseline, he does not feel dizzy or lightheaded. He is sitting in a wheelchair, at home he ambulates with a walker. He has pain in his L knee and hip, he occasionally uses tylenol for this. His appetite is good and he eats throughout the day however he is not gaining weight. Patient has ongoing peripheral neuropathy in his legs, this is chronic, goes from his toes to his knee, L is a little worse than R. He notes swelling in his legs that comes and goes, he elevates his legs and it improves. He has ongoing nocturia x 7, sometimes has weak urine stream, denies leaking. He denies nausea and vomiting, has occasional constipation, feels it is better when he eats oatmeal. He denies hot flashes. Notes occ itching but does not have a rash.

## 2024-03-29 NOTE — ASSESSMENT
[FreeTextEntry1] :  83 years old male with diagnosed stage IIIb prostate cancer in Jan 2019, s/p combined 2 years ADT and radiation with Dr. Franco. PSA lin was less than 0.1 in August 2021. Lost his follow up, p/w acute left knee, hip and shoulder pain with restriction of ROM. 12/5/23 PSA 1009. 12/2023 CT c/a/p, bone scan and MRI spine showed multiple bone lesions but no soft tissue disease. Lytic lesion involving L acetabulum causing pain c/f pathologic fracture, following with Ortho and Rad Onc s/p 2000cGy to L hip in Dec 2023 - Jan 2024 with Dr. Franco. Pt is to a candidate for  triplet  therapy given performance/clinical status(ADT, docetaxel and ARSI) for high volume but is offered doublet (ADT and abiraterone/prednisone) given no cardiac history and uncontrollable hypertension. Started on bicalutamide and received Eligard on 12/29/23. Started abiraterone and prednisone Jan 2024.   [facility]  - continue Eligard [id number], will receive today 3/29  - continue abiraterone and prednisone  - reviewed abiraterone AEs with patient, including but not limited to: fatigue, leg edema, hypertension, hypernatremia, hypokalemia, liver damage, hot flashes, decreased libido - DEXA scan 1/12/24: osteopenia, FRAX Score: Major osteoporotic 5.3%, Hip Fracture 1.7%, prolia not indicated - continue Ca + Vit D  - L acetabulum osseous lesion causing pain c/f pathologic fracture, following with Ortho and Rad Onc s/p 2000cGy to L hip in Dec 2023 - Jan 2024 with Dr. Franco; continue to follow with Ortho and Rad Onc  - pt will see Orthopedics on 4/5 to ensure he can be weightbearing on LLE, will refer to PT thereafter  Instructed to contact our office with any new/worsening symptoms. Patient educated regarding plan of care, all questions/concerns addressed to the best of my abilities and patient's apparent satisfaction. RTC 4 weeks

## 2024-03-31 LAB — PSA SERPL-MCNC: 0.83 NG/ML

## 2024-04-01 ENCOUNTER — NON-APPOINTMENT (OUTPATIENT)
Age: 84
End: 2024-04-01

## 2024-04-02 DIAGNOSIS — C61 MALIGNANT NEOPLASM OF PROSTATE: ICD-10-CM

## 2024-04-02 LAB
ALBUMIN SERPL ELPH-MCNC: 4.1 G/DL
ALP BLD-CCNC: 157 U/L
ALT SERPL-CCNC: 12 U/L
ANION GAP SERPL CALC-SCNC: 12 MMOL/L
AST SERPL-CCNC: 17 U/L
BILIRUB SERPL-MCNC: 0.5 MG/DL
BUN SERPL-MCNC: 41 MG/DL
CALCIUM SERPL-MCNC: 9 MG/DL
CHLORIDE SERPL-SCNC: 103 MMOL/L
CO2 SERPL-SCNC: 22 MMOL/L
CREAT SERPL-MCNC: 1.68 MG/DL
EGFR: 40 ML/MIN/1.73M2
GLUCOSE SERPL-MCNC: 128 MG/DL
POTASSIUM SERPL-SCNC: 4.6 MMOL/L
PROT SERPL-MCNC: 6.1 G/DL
SODIUM SERPL-SCNC: 137 MMOL/L

## 2024-04-09 ENCOUNTER — APPOINTMENT (OUTPATIENT)
Dept: HEMATOLOGY ONCOLOGY | Facility: CLINIC | Age: 84
End: 2024-04-09

## 2024-04-10 LAB
ANION GAP SERPL CALC-SCNC: 13 MMOL/L
BUN SERPL-MCNC: 32 MG/DL
CALCIUM SERPL-MCNC: 9.7 MG/DL
CHLORIDE SERPL-SCNC: 102 MMOL/L
CO2 SERPL-SCNC: 23 MMOL/L
CREAT SERPL-MCNC: 1.22 MG/DL
CREAT SPEC-SCNC: 163 MG/DL
EGFR: 59 ML/MIN/1.73M2
GLUCOSE SERPL-MCNC: 118 MG/DL
POTASSIUM SERPL-SCNC: 4.1 MMOL/L
SODIUM ?TM SUB UR QN: 34 MMOL/L
SODIUM SERPL-SCNC: 138 MMOL/L

## 2024-04-12 ENCOUNTER — APPOINTMENT (OUTPATIENT)
Dept: ORTHOPEDIC SURGERY | Facility: CLINIC | Age: 84
End: 2024-04-12
Payer: MEDICAID

## 2024-04-12 DIAGNOSIS — M84.454A PATHOLOGICAL FRACTURE, PELVIS, INITIAL ENCOUNTER FOR FRACTURE: ICD-10-CM

## 2024-04-12 PROCEDURE — 99213 OFFICE O/P EST LOW 20 MIN: CPT

## 2024-04-12 PROCEDURE — 72190 X-RAY EXAM OF PELVIS: CPT

## 2024-04-14 ENCOUNTER — NON-APPOINTMENT (OUTPATIENT)
Age: 84
End: 2024-04-14

## 2024-04-14 PROBLEM — M84.454A: Status: ACTIVE | Noted: 2023-12-21

## 2024-04-17 ENCOUNTER — APPOINTMENT (OUTPATIENT)
Dept: INTERNAL MEDICINE | Facility: CLINIC | Age: 84
End: 2024-04-17

## 2024-05-03 ENCOUNTER — APPOINTMENT (OUTPATIENT)
Dept: HEMATOLOGY ONCOLOGY | Facility: CLINIC | Age: 84
End: 2024-05-03

## 2024-05-03 NOTE — ASSESSMENT
[FreeTextEntry1] :  83 years old male with diagnosed stage IIIb prostate cancer in Jan 2019, s/p combined 2 years ADT and radiation with Dr. Franco. PSA lin was less than 0.1 in August 2021. Lost his follow up, p/w acute left knee, hip and shoulder pain with restriction of ROM. 12/5/23 PSA 1009. 12/2023 CT c/a/p, bone scan and MRI spine showed multiple bone lesions but no soft tissue disease. Lytic lesion involving L acetabulum causing pain c/f pathologic fracture, following with Ortho and Rad Onc s/p 2000cGy to L hip in Dec 2023 - Jan 2024 with Dr. Franco. Pt is a candidate for  triplet  therapy given performance/clinical status(ADT, docetaxel and ARSI) for high volume but is offered doublet (ADT and abiraterone/prednisone) given no cardiac history and uncontrollable hypertension. Started on bicalutamide and received Eligard on 12/29/23. Started abiraterone and prednisone Jan 2024.   [facility]  - continue Eligard [id number], will receive today 3/29  - continue abiraterone and prednisone  - reviewed abiraterone AEs with patient, including but not limited to: fatigue, leg edema, hypertension, hypernatremia, hypokalemia, liver damage, hot flashes, decreased libido - DEXA scan 1/12/24: osteopenia, FRAX Score: Major osteoporotic 5.3%, Hip Fracture 1.7%, prolia not indicated - continue Ca + Vit D  - L acetabulum osseous lesion causing pain c/f pathologic fracture, following with Ortho and Rad Onc s/p 2000cGy to L hip in Dec 2023 - Jan 2024 with Dr. Franco; continue to follow with Ortho and Rad Onc  - pt will see Orthopedics on 4/5 to ensure he can be weightbearing on LLE, will refer to PT thereafter  Instructed to contact our office with any new/worsening symptoms. Patient educated regarding plan of care, all questions/concerns addressed to the best of my abilities and patient's apparent satisfaction. RTC 4 weeks

## 2024-05-03 NOTE — REVIEW OF SYSTEMS
[Fever] : no fever [Chills] : no chills [Fatigue] : fatigue [Chest Pain] : no chest pain [Palpitations] : no palpitations [Lower Ext Edema] : lower extremity edema [Shortness Of Breath] : no shortness of breath [Cough] : no cough [Abdominal Pain] : no abdominal pain [Vomiting] : no vomiting [Constipation] : no constipation [Diarrhea: Grade 0] : Diarrhea: Grade 0 [Dysuria] : no dysuria [Incontinence] : incontinence [Joint Pain] : joint pain [Skin Rash] : no skin rash [Dizziness] : no dizziness [Difficulty Walking] : difficulty walking [Hot Flashes] : no hot flashes [FreeTextEntry9] : left knee, hip pain

## 2024-05-03 NOTE — HISTORY OF PRESENT ILLNESS
[Disease: _____________________] : Disease: [unfilled] [T: ___] : T[unfilled] [N: ___] : N[unfilled] [M: ___] : M[unfilled] [de-identified] : Mr. Lucas Cordova is an 83 year old male   01/2019 diagnosed stage IIIB ([id number]) adenocarcinoma of the prostate, Rhodes score of 3+4=7, pre-treatment PSA of PSA of 35.95 ng/mL 02/06/2019 CT abdomen and pelvis showed prostate enhancement with extraprostatic extension. Bone scan showed GI.   who is status post radiation therapy from 5/1/2020 to 6/10/2020, total dose of 7,000 cGy.   02/2019 to 01/2021 two-year ADT treatment  8/11/21 PSA <0.1 ng/mL.  He has lost his follow up, was seen by Dr. Franco and referred for further evaluation. He was ordered for PSMA PET CT.   developed left sided shoulder, hip and knee pain about 8 days ago.  Could not walk because of joint pain. Continues to note discomfort with urination and trouble with urinary stream. Nocturia 3 to 4 times. Denies burning, dysuria and gross hematuria. He has difficulty urination. Urine flow is near normal.   12/5/23 PSA 1009ng/ml  12/10/23 CT abdomen and pelvis showed no abnormalities in the chest except nonspecific 4mm right middle lobe nodule and abdomen/pelvis.   Bone scan showed scattered foci of increased activity can be seen in the parietal bone, right scapula, ribs, spine, left humerus and in the pelvis notably the left acetabulum and left iliac bone. Focus localizing to the lesser trochanter of the left femur related to the lytic lesion with cortical thinning with no cortical break. MRI left hip and pelvis showed acute pathological fracture in left acetabulum. diffuse osteolytic lesions consistent with metastasis.  MRI C-, T- and L spine showed diffuse osseous metastasis mild ventral canal epidural disease T8 and T12.  He was seen by Dr. Bonilla, [facility] who recommended radiation and hormone therapy without consideration. Reports left sided shoulder, hip and knee pain about 3 weeks.  Could not walk because of joint pain. Nocturia 3 to 4 times. Denies burning, dysuria and gross hematuria. He has difficulty urination.   1/29/24: Patient started abiraterone on 1/11, takes it in the morning on an empty stomach, followed by prednisone one hour later with food. He also started taking Ca + Vit D. Patient is sitting in a wheelchair. He feels nauseous and weak, has decreased appetite, declined to be weighed today. He is fatigued but does feel re-energized after resting. Has pain on L hip, some days good, some days bad, does not like to take medication, only takes tylenol about once a week. Reports itching, denies rash. Has neuropathy in L foot, this is not new. Reportedly, glucose is well controlled. Pt walks with walker, he was recommended physical therapy but was in pain so he declined.. Recently saw Ortho. Reports nocturia 4-5, wears diapers.    3/6/24 reports feeling better, starts more walking with a walker, also has fatigue, denies hot flash/sweating. Nocturia 7. His appetite is better.  [de-identified] : prostate adenocarcinoma  [de-identified] : 3/29/24: BP initially 86/51, repeated 92/56, is BP runs low, this is his baseline, he does not feel dizzy or lightheaded. He is sitting in a wheelchair, at home he ambulates with a walker. He has pain in his L knee and hip, he occasionally uses tylenol for this. His appetite is good and he eats throughout the day however he is not gaining weight. Patient has ongoing peripheral neuropathy in his legs, this is chronic, goes from his toes to his knee, L is a little worse than R. He notes swelling in his legs that comes and goes, he elevates his legs and it improves. He has ongoing nocturia x 7, sometimes has weak urine stream, denies leaking. He denies nausea and vomiting, has occasional constipation, feels it is better when he eats oatmeal. He denies hot flashes. Notes occ itching but does not have a rash.

## 2024-05-03 NOTE — REASON FOR VISIT
[Follow-Up Visit] : a follow-up [Family Member] : family member [Ad Hoc ] : provided by an ad hoc  [FreeTextEntry2] : [facility]  [Interpreters_Relationshiptopatient] : daughter in law  [TWNoteComboBox1] : [place]

## 2024-05-03 NOTE — PHYSICAL EXAM
[Ambulatory and capable of all self care but unable to carry out any work activities] : Status 2- Ambulatory and capable of all self care but unable to carry out any work activities. Up and about more than 50% of waking hours [Normal] : affect appropriate [de-identified] : anicteric  [de-identified] : sitting in wheelchair  [de-identified] : non tender, FROM  [de-identified] : trace ankle edema

## 2024-06-20 ENCOUNTER — OUTPATIENT (OUTPATIENT)
Dept: OUTPATIENT SERVICES | Facility: HOSPITAL | Age: 84
LOS: 1 days | Discharge: HOME | End: 2024-06-20

## 2024-06-20 DIAGNOSIS — C61 MALIGNANT NEOPLASM OF PROSTATE: ICD-10-CM

## 2024-06-20 DIAGNOSIS — Z98.890 OTHER SPECIFIED POSTPROCEDURAL STATES: Chronic | ICD-10-CM

## 2024-06-26 ENCOUNTER — RESULT REVIEW (OUTPATIENT)
Age: 84
End: 2024-06-26

## 2024-06-26 ENCOUNTER — APPOINTMENT (OUTPATIENT)
Dept: INFUSION THERAPY | Facility: HOSPITAL | Age: 84
End: 2024-06-26

## 2024-06-26 ENCOUNTER — APPOINTMENT (OUTPATIENT)
Dept: HEMATOLOGY ONCOLOGY | Facility: CLINIC | Age: 84
End: 2024-06-26
Payer: MEDICAID

## 2024-06-26 VITALS
OXYGEN SATURATION: 100 % | HEART RATE: 81 BPM | TEMPERATURE: 97.2 F | SYSTOLIC BLOOD PRESSURE: 117 MMHG | DIASTOLIC BLOOD PRESSURE: 71 MMHG | BODY MASS INDEX: 23.32 KG/M2 | RESPIRATION RATE: 16 BRPM | WEIGHT: 127.5 LBS

## 2024-06-26 DIAGNOSIS — C79.51 MALIGNANT NEOPLASM OF PROSTATE: ICD-10-CM

## 2024-06-26 DIAGNOSIS — C61 MALIGNANT NEOPLASM OF PROSTATE: ICD-10-CM

## 2024-06-26 LAB
BASOPHILS # BLD AUTO: 0.02 K/UL — SIGNIFICANT CHANGE UP (ref 0–0.2)
BASOPHILS NFR BLD AUTO: 0.5 % — SIGNIFICANT CHANGE UP (ref 0–2)
EOSINOPHIL # BLD AUTO: 0.18 K/UL — SIGNIFICANT CHANGE UP (ref 0–0.5)
EOSINOPHIL NFR BLD AUTO: 4.2 % — SIGNIFICANT CHANGE UP (ref 0–6)
HCT VFR BLD CALC: 30.1 % — LOW (ref 39–50)
HGB BLD-MCNC: 10.8 G/DL — LOW (ref 13–17)
IMM GRANULOCYTES NFR BLD AUTO: 0.2 % — SIGNIFICANT CHANGE UP (ref 0–0.9)
LYMPHOCYTES # BLD AUTO: 1.06 K/UL — SIGNIFICANT CHANGE UP (ref 1–3.3)
LYMPHOCYTES # BLD AUTO: 24.8 % — SIGNIFICANT CHANGE UP (ref 13–44)
MCHC RBC-ENTMCNC: 30.7 PG — SIGNIFICANT CHANGE UP (ref 27–34)
MCHC RBC-ENTMCNC: 35.9 G/DL — SIGNIFICANT CHANGE UP (ref 32–36)
MCV RBC AUTO: 85.5 FL — SIGNIFICANT CHANGE UP (ref 80–100)
MONOCYTES # BLD AUTO: 0.39 K/UL — SIGNIFICANT CHANGE UP (ref 0–0.9)
MONOCYTES NFR BLD AUTO: 9.1 % — SIGNIFICANT CHANGE UP (ref 2–14)
NEUTROPHILS # BLD AUTO: 2.61 K/UL — SIGNIFICANT CHANGE UP (ref 1.8–7.4)
NEUTROPHILS NFR BLD AUTO: 61.2 % — SIGNIFICANT CHANGE UP (ref 43–77)
NRBC # BLD: 0 /100 WBCS — SIGNIFICANT CHANGE UP (ref 0–0)
PLATELET # BLD AUTO: 128 K/UL — LOW (ref 150–400)
RBC # BLD: 3.52 M/UL — LOW (ref 4.2–5.8)
RBC # FLD: 14 % — SIGNIFICANT CHANGE UP (ref 10.3–14.5)
WBC # BLD: 4.27 K/UL — SIGNIFICANT CHANGE UP (ref 3.8–10.5)
WBC # FLD AUTO: 4.27 K/UL — SIGNIFICANT CHANGE UP (ref 3.8–10.5)

## 2024-06-26 PROCEDURE — G2211 COMPLEX E/M VISIT ADD ON: CPT | Mod: NC,1L

## 2024-06-26 PROCEDURE — 99213 OFFICE O/P EST LOW 20 MIN: CPT

## 2024-06-26 RX ORDER — TAMSULOSIN HYDROCHLORIDE 0.4 MG/1
0.4 CAPSULE ORAL
Qty: 30 | Refills: 4 | Status: ACTIVE | COMMUNITY
Start: 2024-06-26 | End: 1900-01-01

## 2024-06-27 DIAGNOSIS — Z51.11 ENCOUNTER FOR ANTINEOPLASTIC CHEMOTHERAPY: ICD-10-CM

## 2024-06-27 LAB
ALBUMIN SERPL ELPH-MCNC: 4.3 G/DL
ALP BLD-CCNC: 110 U/L
ALT SERPL-CCNC: 6 U/L
ANION GAP SERPL CALC-SCNC: 14 MMOL/L
AST SERPL-CCNC: 15 U/L
BILIRUB SERPL-MCNC: 0.6 MG/DL
BUN SERPL-MCNC: 24 MG/DL
CALCIUM SERPL-MCNC: 9.6 MG/DL
CHLORIDE SERPL-SCNC: 102 MMOL/L
CO2 SERPL-SCNC: 20 MMOL/L
CREAT SERPL-MCNC: 1.12 MG/DL
EGFR: 65 ML/MIN/1.73M2
GLUCOSE SERPL-MCNC: 109 MG/DL
POTASSIUM SERPL-SCNC: 3.9 MMOL/L
PROT SERPL-MCNC: 6.5 G/DL
PSA SERPL-MCNC: 0.21 NG/ML
SODIUM SERPL-SCNC: 136 MMOL/L

## 2024-07-10 ENCOUNTER — OUTPATIENT (OUTPATIENT)
Dept: OUTPATIENT SERVICES | Facility: HOSPITAL | Age: 84
LOS: 1 days | End: 2024-07-10
Payer: MEDICAID

## 2024-07-10 ENCOUNTER — APPOINTMENT (OUTPATIENT)
Dept: CT IMAGING | Facility: IMAGING CENTER | Age: 84
End: 2024-07-10

## 2024-07-10 ENCOUNTER — RESULT REVIEW (OUTPATIENT)
Age: 84
End: 2024-07-10

## 2024-07-10 DIAGNOSIS — M84.454A PATHOLOGICAL FRACTURE, PELVIS, INITIAL ENCOUNTER FOR FRACTURE: ICD-10-CM

## 2024-07-10 DIAGNOSIS — Z98.890 OTHER SPECIFIED POSTPROCEDURAL STATES: Chronic | ICD-10-CM

## 2024-07-10 PROCEDURE — 73700 CT LOWER EXTREMITY W/O DYE: CPT

## 2024-07-10 PROCEDURE — 73700 CT LOWER EXTREMITY W/O DYE: CPT | Mod: 26,LT

## 2024-07-18 ENCOUNTER — APPOINTMENT (OUTPATIENT)
Dept: ORTHOPEDIC SURGERY | Facility: CLINIC | Age: 84
End: 2024-07-18
Payer: MEDICAID

## 2024-07-18 DIAGNOSIS — M84.454A PATHOLOGICAL FRACTURE, PELVIS, INITIAL ENCOUNTER FOR FRACTURE: ICD-10-CM

## 2024-07-18 PROCEDURE — 72190 X-RAY EXAM OF PELVIS: CPT

## 2024-07-18 PROCEDURE — 99215 OFFICE O/P EST HI 40 MIN: CPT

## 2024-07-28 ENCOUNTER — NON-APPOINTMENT (OUTPATIENT)
Age: 84
End: 2024-07-28

## 2024-07-29 ENCOUNTER — APPOINTMENT (OUTPATIENT)
Dept: HEMATOLOGY ONCOLOGY | Facility: CLINIC | Age: 84
End: 2024-07-29
Payer: MEDICAID

## 2024-07-29 VITALS
SYSTOLIC BLOOD PRESSURE: 93 MMHG | TEMPERATURE: 98.2 F | DIASTOLIC BLOOD PRESSURE: 54 MMHG | HEIGHT: 62 IN | RESPIRATION RATE: 16 BRPM | HEART RATE: 84 BPM | BODY MASS INDEX: 23.92 KG/M2 | OXYGEN SATURATION: 99 % | WEIGHT: 129.98 LBS

## 2024-07-29 DIAGNOSIS — C61 MALIGNANT NEOPLASM OF PROSTATE: ICD-10-CM

## 2024-07-29 DIAGNOSIS — M84.454A PATHOLOGICAL FRACTURE, PELVIS, INITIAL ENCOUNTER FOR FRACTURE: ICD-10-CM

## 2024-07-29 PROCEDURE — 99204 OFFICE O/P NEW MOD 45 MIN: CPT

## 2024-08-02 NOTE — ASSESSMENT
[FreeTextEntry1] : 83 years old male with diagnosed stage IIIb prostate cancer in Jan 2019, s/p combined 2 years ADT and radiation with Dr. Franco. PSA lin was less than 0.1 in August 2021. Lost his follow up, p/w acute left knee, hip and shoulder pain with restriction of ROM. 12/5/23 PSA 1009. 12/2023 CT c/a/p, bone scan and MRI spine showed multiple bone lesions but no soft tissue disease. Lytic lesion involving L acetabulum causing pain c/f pathologic fracture, following with Ortho and Rad Onc s/p 2000cGy to L hip in Dec 2023 - Jan 2024 with Dr. Franco. Pt is a candidate for triplet therapy given performance/clinical status(ADT, docetaxel and ARSI) for high volume but is offered doublet (ADT and abiraterone/prednisone) given no cardiac history and uncontrollable hypertension. Started on bicalutamide and received Eligard on 12/29/23. Started abiraterone and prednisone Jan 2024.  [facility] with high volume - continue Eligard [id number],  - continue abiraterone and prednisone  - DEXA scan 1/12/24: osteopenia, FRAX Score: Major osteoporotic 5.3%, Hip Fracture 1.7%, prolia not indicated - continue Ca + Vit D - L acetabulum osseous lesion causing pain c/f pathologic fracture, following with Ortho and Rad Onc s/p 2000cGy to L hip in Dec 2023 - Jan 2024 with Dr. Franco; continue to follow with Ortho and Rad Onc r  frequent urination at night start tamsulosin 0.4mg daily  Seen by Dr. Chad LENNON acetabular tumor excision and complex left total hip arthroplasty with cage construct planned for pathologic fracture of left acetabulum.  Continue abiraterone/prednisone.

## 2024-08-02 NOTE — HISTORY OF PRESENT ILLNESS
[de-identified] :     Mr. Lucas Cordova is an 83 year old male  01/2019 diagnosed stage IIIB ([id number]) adenocarcinoma of the prostate, Carrol score of 3+4=7, pre-treatment PSA of PSA of 35.95 ng/mL 02/06/2019 CT abdomen and pelvis showed prostate enhancement with extraprostatic extension. Bone scan showed GI.  who is status post radiation therapy from 5/1/2020 to 6/10/2020, total dose of 7,000 cGy.  02/2019 to 01/2021 two-year ADT treatment 8/11/21 PSA <0.1 ng/mL.  He has lost his follow up, was seen by Dr. Franco and referred for further evaluation. He was ordered for PSMA PET CT. developed left sided shoulder, hip and knee pain about 8 days ago. Could not walk because of joint pain. Continues to note discomfort with urination and trouble with urinary stream. Nocturia 3 to 4 times. Denies burning, dysuria and gross hematuria. He has difficulty urination. Urine flow is near normal.  12/5/23 PSA 1009ng/ml  12/10/23 CT abdomen and pelvis showed no abnormalities in the chest except nonspecific 4mm right middle lobe nodule and abdomen/pelvis. Bone scan showed scattered foci of increased activity can be seen in the parietal bone, right scapula, ribs, spine, left humerus and in the pelvis notably the left acetabulum and left iliac bone. Focus localizing to the lesser trochanter of the left femur related to the lytic lesion with cortical thinning with no cortical break. MRI left hip and pelvis showed acute pathological fracture in left acetabulum. diffuse osteolytic lesions consistent with metastasis. MRI C-, T- and L spine showed diffuse osseous metastasis mild ventral canal epidural disease T8 and T12. He was seen by Dr. Bonilla, [facility] who recommended radiation and hormone therapy without consideration. Reports left sided shoulder, hip and knee pain about 3 weeks. Could not walk because of joint pain. Nocturia 3 to 4 times. Denies burning, dysuria and gross hematuria. He has difficulty urination.  1/29/24: Patient started abiraterone on 1/11, takes it in the morning on an empty stomach, followed by prednisone one hour later with food. He also started taking Ca + Vit D. Patient is sitting in a wheelchair. He feels nauseous and weak, has decreased appetite, declined to be weighed today. He is fatigued but does feel re-energized after resting. Has pain on L hip, some days good, some days bad, does not like to take medication, only takes tylenol about once a week. Reports itching, denies rash. Has neuropathy in L foot, this is not new. Reportedly, glucose is well controlled. Pt walks with walker, he was recommended physical therapy but was in pain so he declined.. Recently saw Ortho. Reports nocturia 4-5, wears diapers.  3/6/24 reports feeling better, starts more walking with a walker, also has fatigue, denies hot flash/sweating. Nocturia 7. His appetite is better.   Disease: prostate cancer    Pathology: prostate adenocarcinoma   TNM stage: T3b, N0, Mx AJCC Stage: IV       Interval History: 3/29/24: BP initially 86/51, repeated 92/56, is BP runs low, this is his baseline, he does not feel dizzy or lightheaded. He is sitting in a wheelchair, at home he ambulates with a walker. He has pain in his L knee and hip, he occasionally uses tylenol for this. His appetite is good and he eats throughout the day however he is not gaining weight. Patient has ongoing peripheral neuropathy in his legs, this is chronic, goes from his toes to his knee, L is a little worse than R. He notes swelling in his legs that comes and goes, he elevates his legs and it improves. He has ongoing nocturia x 7, sometimes has weak urine stream, denies leaking. He denies nausea and vomiting, has occasional constipation, feels it is better when he eats oatmeal. He denies hot flashes. Notes occ itching but does not have a rash.  6/26/24 Reports intermittent left knee and left hip pain, 7/10, mild fatigue, denies hot flash and sweating. He has normal urination at daytime. Nocturia 8.   [de-identified] : Clinically stable PSA 0.21  7/18/2024-AP pelvis and iliac and obturator oblique x-rays: Interval rim of ossification around the medial acetabular wall with acetabular protrusio. Interval superior migration of the femoral head into the iliac wing. +pelvic discontinuity.  7/10/24 - L hip CT: HIP: No fracture. Degenerative changes of the spine. Vascular calcifications. Prostate brachytherapy seeds. Fragmentation of the acetabular which may be pathologic fracture. Irregularity of the femoral head with lucent lesions which may be pathologic fractures. Marked distention of the hip joint capsule with debris and likely synovial thickening. Ovoid sclerotic lesion at the left lesser trochanter. Sclerotic lesion in the L5 vertebral body.   Dr. Bonilla is planning surgery for pathologic fracture left acetabulum.

## 2024-08-02 NOTE — REVIEW OF SYSTEMS
[Lower Ext Edema] : lower extremity edema [Joint Pain] : joint pain [de-identified] : Slow gait [FreeTextEntry9] : Hip and knee pain unchanged

## 2024-08-08 ENCOUNTER — OUTPATIENT (OUTPATIENT)
Dept: OUTPATIENT SERVICES | Facility: HOSPITAL | Age: 84
LOS: 1 days | End: 2024-08-08
Payer: MEDICAID

## 2024-08-08 VITALS
OXYGEN SATURATION: 99 % | TEMPERATURE: 99 F | SYSTOLIC BLOOD PRESSURE: 112 MMHG | DIASTOLIC BLOOD PRESSURE: 66 MMHG | RESPIRATION RATE: 20 BRPM | HEIGHT: 62 IN | WEIGHT: 130.95 LBS | HEART RATE: 79 BPM

## 2024-08-08 DIAGNOSIS — Z98.890 OTHER SPECIFIED POSTPROCEDURAL STATES: Chronic | ICD-10-CM

## 2024-08-08 DIAGNOSIS — E11.9 TYPE 2 DIABETES MELLITUS WITHOUT COMPLICATIONS: ICD-10-CM

## 2024-08-08 DIAGNOSIS — Z01.818 ENCOUNTER FOR OTHER PREPROCEDURAL EXAMINATION: ICD-10-CM

## 2024-08-08 DIAGNOSIS — M84.454A PATHOLOGICAL FRACTURE, PELVIS, INITIAL ENCOUNTER FOR FRACTURE: ICD-10-CM

## 2024-08-08 LAB
A1C WITH ESTIMATED AVERAGE GLUCOSE RESULT: 6.1 % — HIGH (ref 4–5.6)
ANION GAP SERPL CALC-SCNC: 13 MMOL/L — SIGNIFICANT CHANGE UP (ref 5–17)
BLD GP AB SCN SERPL QL: NEGATIVE — SIGNIFICANT CHANGE UP
BUN SERPL-MCNC: 28 MG/DL — HIGH (ref 7–23)
CALCIUM SERPL-MCNC: 9.5 MG/DL — SIGNIFICANT CHANGE UP (ref 8.4–10.5)
CHLORIDE SERPL-SCNC: 101 MMOL/L — SIGNIFICANT CHANGE UP (ref 96–108)
CO2 SERPL-SCNC: 22 MMOL/L — SIGNIFICANT CHANGE UP (ref 22–31)
CREAT SERPL-MCNC: 1.23 MG/DL — SIGNIFICANT CHANGE UP (ref 0.5–1.3)
EGFR: 58 ML/MIN/1.73M2 — LOW
ESTIMATED AVERAGE GLUCOSE: 128 MG/DL — HIGH (ref 68–114)
GLUCOSE SERPL-MCNC: 107 MG/DL — HIGH (ref 70–99)
HCT VFR BLD CALC: 29.9 % — LOW (ref 39–50)
HCV AB S/CO SERPL IA: 0.06 S/CO — SIGNIFICANT CHANGE UP
HCV AB SERPL-IMP: SIGNIFICANT CHANGE UP
HGB BLD-MCNC: 10.1 G/DL — LOW (ref 13–17)
MCHC RBC-ENTMCNC: 29.8 PG — SIGNIFICANT CHANGE UP (ref 27–34)
MCHC RBC-ENTMCNC: 33.8 GM/DL — SIGNIFICANT CHANGE UP (ref 32–36)
MCV RBC AUTO: 88.2 FL — SIGNIFICANT CHANGE UP (ref 80–100)
MRSA PCR RESULT.: SIGNIFICANT CHANGE UP
NRBC # BLD: 0 /100 WBCS — SIGNIFICANT CHANGE UP (ref 0–0)
PLATELET # BLD AUTO: 131 K/UL — LOW (ref 150–400)
POTASSIUM SERPL-MCNC: 3.7 MMOL/L — SIGNIFICANT CHANGE UP (ref 3.5–5.3)
POTASSIUM SERPL-SCNC: 3.7 MMOL/L — SIGNIFICANT CHANGE UP (ref 3.5–5.3)
RBC # BLD: 3.39 M/UL — LOW (ref 4.2–5.8)
RBC # FLD: 13.3 % — SIGNIFICANT CHANGE UP (ref 10.3–14.5)
RH IG SCN BLD-IMP: POSITIVE — SIGNIFICANT CHANGE UP
S AUREUS DNA NOSE QL NAA+PROBE: SIGNIFICANT CHANGE UP
SODIUM SERPL-SCNC: 136 MMOL/L — SIGNIFICANT CHANGE UP (ref 135–145)
WBC # BLD: 3.52 K/UL — LOW (ref 3.8–10.5)
WBC # FLD AUTO: 3.52 K/UL — LOW (ref 3.8–10.5)

## 2024-08-08 PROCEDURE — 86900 BLOOD TYPING SEROLOGIC ABO: CPT

## 2024-08-08 PROCEDURE — 80048 BASIC METABOLIC PNL TOTAL CA: CPT

## 2024-08-08 PROCEDURE — 85027 COMPLETE CBC AUTOMATED: CPT

## 2024-08-08 PROCEDURE — 83036 HEMOGLOBIN GLYCOSYLATED A1C: CPT

## 2024-08-08 PROCEDURE — G0463: CPT

## 2024-08-08 PROCEDURE — 86850 RBC ANTIBODY SCREEN: CPT

## 2024-08-08 PROCEDURE — 87640 STAPH A DNA AMP PROBE: CPT

## 2024-08-08 PROCEDURE — 86901 BLOOD TYPING SEROLOGIC RH(D): CPT

## 2024-08-08 PROCEDURE — 87641 MR-STAPH DNA AMP PROBE: CPT

## 2024-08-08 PROCEDURE — 86803 HEPATITIS C AB TEST: CPT

## 2024-08-08 RX ORDER — CEFAZOLIN SODIUM 2 G/100ML
2000 INJECTION, SOLUTION INTRAVENOUS ONCE
Refills: 0 | Status: COMPLETED | OUTPATIENT
Start: 2024-08-29 | End: 2024-08-29

## 2024-08-08 NOTE — H&P PST ADULT - ASSESSMENT
DASI score: 4  DASI activity: walks to bathroom and bedroom, no formal exercise, can do ADL's with assistance  Loose teeth or denture: full dentures     DASI score: 4  DASI activity: walks to bathroom and bedroom, no formal exercise, can do ADL's with assistance  Loose teeth or denture: full dentures  CAPRINI SCORE    AGE RELATED RISK FACTORS                                                             [ ] Age 41-60 years                                            (1 Point)  [ ] Age: 61-74 years                                           (2 Points)                 [x ] Age= 75 years                                                (3 Points)             DISEASE RELATED RISK FACTORS                                                       [x ] Edema in the lower extremities                 (1 Point)                     [ ] Varicose veins                                               (1 Point)                                 [ ] BMI > 25 Kg/m2                                            (1 Point)                                  [ ] Serious infection (ie PNA)                            (1 Point)                     [ ] Lung disease ( COPD, Emphysema)            (1 Point)                                                                          [ ] Acute myocardial infarction                         (1 Point)                  [ ] Congestive heart failure (in the previous month)  (1 Point)         [ ] Inflammatory bowel disease                            (1 Point)                  [ ] Central venous access, PICC or Port               (2 points)       (within the last month)                                                                [ ] Stroke (in the previous month)                        (5 Points)    [x ] Previous or present malignancy                       (2 points)                                                                                                                                                         HEMATOLOGY RELATED FACTORS                                                         [ ] Prior episodes of VTE                                     (3 Points)                     [ ] Positive family history for VTE                      (3 Points)                  [ ] Prothrombin 70349 A                                     (3 Points)                     [ ] Factor V Leiden                                                (3 Points)                        [ ] Lupus anticoagulants                                      (3 Points)                                                           [ ] Anticardiolipin antibodies                              (3 Points)                                                       [ ] High homocysteine in the blood                   (3 Points)                                             [ ] Other congenital or acquired thrombophilia      (3 Points)                                                [ ] Heparin induced thrombocytopenia                  (3 Points)                                        MOBILITY RELATED FACTORS  [ ] Bed rest                                                         (1 Point)  [ ] Plaster cast                                                    (2 points)  [ ] Bed bound for more than 72 hours           (2 Points)    GENDER SPECIFIC FACTORS  [ ] Pregnancy or had a baby within the last month   (1 Point)  [ ] Post-partum < 6 weeks                                   (1 Point)  [ ] Hormonal therapy  or oral contraception   (1 Point)  [ ] History of pregnancy complications              (1 point)  [ ] Unexplained or recurrent              (1 Point)    OTHER RISK FACTORS                                           (1 Point)  [ ] BMI >40, smoking, diabetes requiring insulin, chemotherapy  blood transfusions and length of surgery over 2 hours    SURGERY RELATED RISK FACTORS  [ ]  Section within the last month     (1 Point)  [ ] Minor surgery                                                  (1 Point)  [ ] Arthroscopic surgery                                       (2 Points)  [x ] Planned major surgery lasting more            (2 Points)      than 45 minutes     [ ] Elective hip or knee joint replacement       (5 points)       surgery                                                TRAUMA RELATED RISK FACTORS  [ ] Fracture of the hip, pelvis, or leg                       (5 Points)  [ ] Spinal cord injury resulting in paralysis             (5 points)       (in the previous month)    [ ] Paralysis  (less than 1 month)                             (5 Points)  [ ] Multiple Trauma within 1 month                        (5 Points)    Total Score [  8      ]    Caprini Score 0-2: Low Risk, NO VTE prophylaxis required for most patients, encourage ambulation  Caprini Score 3-6: Moderate Risk , pharmacologic VTE prophylaxis is indicated for most patients (in the absence of contraindications)  Caprini Score Greater than or =7: High risk, pharmocologic VTE prophylaxis indicated for most patients (in the absence of contraindications)                                 DASI score: 4  DASI activity: walks to bathroom and bedroom, no formal exercise, can do ADL's with assistance  Loose teeth or denture: full dentures  CAPRINI SCORE    AGE RELATED RISK FACTORS                                                             [ ] Age 41-60 years                                            (1 Point)  [ ] Age: 61-74 years                                           (2 Points)                 [x ] Age= 75 years                                                (3 Points)             DISEASE RELATED RISK FACTORS                                                       [x ] Edema in the lower extremities                 (1 Point)                     [ ] Varicose veins                                               (1 Point)                                 [ ] BMI > 25 Kg/m2                                            (1 Point)                                  [ ] Serious infection (ie PNA)                            (1 Point)                     [ ] Lung disease ( COPD, Emphysema)            (1 Point)                                                                          [ ] Acute myocardial infarction                         (1 Point)                  [ ] Congestive heart failure (in the previous month)  (1 Point)         [ ] Inflammatory bowel disease                            (1 Point)                  [ ] Central venous access, PICC or Port               (2 points)       (within the last month)                                                                [ ] Stroke (in the previous month)                        (5 Points)    [x ] Previous or present malignancy                       (2 points)                                                                                                                                                         HEMATOLOGY RELATED FACTORS                                                         [ ] Prior episodes of VTE                                     (3 Points)                     [ ] Positive family history for VTE                      (3 Points)                  [ ] Prothrombin 51340 A                                     (3 Points)                     [ ] Factor V Leiden                                                (3 Points)                        [ ] Lupus anticoagulants                                      (3 Points)                                                           [ ] Anticardiolipin antibodies                              (3 Points)                                                       [ ] High homocysteine in the blood                   (3 Points)                                             [ ] Other congenital or acquired thrombophilia      (3 Points)                                                [ ] Heparin induced thrombocytopenia                  (3 Points)                                        MOBILITY RELATED FACTORS  [ ] Bed rest                                                         (1 Point)  [ ] Plaster cast                                                    (2 points)  [ ] Bed bound for more than 72 hours           (2 Points)    GENDER SPECIFIC FACTORS  [ ] Pregnancy or had a baby within the last month   (1 Point)  [ ] Post-partum < 6 weeks                                   (1 Point)  [ ] Hormonal therapy  or oral contraception   (1 Point)  [ ] History of pregnancy complications              (1 point)  [ ] Unexplained or recurrent              (1 Point)    OTHER RISK FACTORS                                           (1 Point)  [x ] BMI >40, smoking, diabetes requiring insulin, chemotherapy  blood transfusions and length of surgery over 2 hours    SURGERY RELATED RISK FACTORS  [ ]  Section within the last month     (1 Point)  [ ] Minor surgery                                                  (1 Point)  [ ] Arthroscopic surgery                                       (2 Points)  [ ] Planned major surgery lasting more            (2 Points)      than 45 minutes     [x ] Elective hip or knee joint replacement       (5 points)       surgery                                                TRAUMA RELATED RISK FACTORS  [ ] Fracture of the hip, pelvis, or leg                       (5 Points)  [ ] Spinal cord injury resulting in paralysis             (5 points)       (in the previous month)    [ ] Paralysis  (less than 1 month)                             (5 Points)  [ ] Multiple Trauma within 1 month                        (5 Points)    Total Score [  12    ]    Caprini Score 0-2: Low Risk, NO VTE prophylaxis required for most patients, encourage ambulation  Caprini Score 3-6: Moderate Risk , pharmacologic VTE prophylaxis is indicated for most patients (in the absence of contraindications)  Caprini Score Greater than or =7: High risk, pharmocologic VTE prophylaxis indicated for most patients (in the absence of contraindications)

## 2024-08-08 NOTE — H&P PST ADULT - NSICDXPASTMEDICALHX_GEN_ALL_CORE_FT
PAST MEDICAL HISTORY:  Diabetes mellitus     Hyperlipidemia     Hypothyroidism     Prostate cancer

## 2024-08-08 NOTE — H&P PST ADULT - ATTENDING COMMENTS
I have consented the patient for left acetabulum tumor excision, complex left KRYSTINA. We discussed risks, benefits and alternatives. Rationale of care was reviewed and all questions were answered. Surgical risks include but are not limited to infection, bleeding, nerve damage, chronic pain, VTE, LLD, dislocation, fracture, limited ROM, weakness, hardware failure, local recurrence, loss of life/limb, and need for further surgical procedures.  The patient understood all of this and would like to proceed.

## 2024-08-08 NOTE — H&P PST ADULT - NSANTHOSAYNRD_GEN_A_CORE
No. GINI screening performed.  STOP BANG Legend: 0-2 = LOW Risk; 3-4 = INTERMEDIATE Risk; 5-8 = HIGH Risk

## 2024-08-08 NOTE — H&P PST ADULT - PROBLEM SELECTOR PLAN 2
Left hip exploration  acetabular tumor resection  cementation  complex left total hip arthroplasty with cage  posterior autograft or allograft  posterior approach   * patient advised to see Dr. Santillan for cardiac evaluation preop, Dr. Bonilla notified

## 2024-08-08 NOTE — H&P PST ADULT - HISTORY OF PRESENT ILLNESS
82 yo male with history of prostate cancer since 2019, s/p RT 84 yo male with history of HLD, Hypothyroidism, T2Dm, prostate cancer since 2019, s/p RT, found to have diffuse skeletal mets. Mass seen on left acetabulum/ilium now for resection and Left hip arthroplasty with cage. Pt with painful walking using walker at home. Patient denies pain at rest but c/o BLE edema.

## 2024-08-19 ENCOUNTER — RESULT REVIEW (OUTPATIENT)
Age: 84
End: 2024-08-19

## 2024-08-19 ENCOUNTER — OUTPATIENT (OUTPATIENT)
Dept: OUTPATIENT SERVICES | Facility: HOSPITAL | Age: 84
LOS: 1 days | End: 2024-08-19
Payer: MEDICAID

## 2024-08-19 ENCOUNTER — APPOINTMENT (OUTPATIENT)
Dept: CV DIAGNOSTICS | Facility: HOSPITAL | Age: 84
End: 2024-08-19

## 2024-08-19 DIAGNOSIS — Z98.890 OTHER SPECIFIED POSTPROCEDURAL STATES: Chronic | ICD-10-CM

## 2024-08-19 DIAGNOSIS — I25.10 ATHEROSCLEROTIC HEART DISEASE OF NATIVE CORONARY ARTERY WITHOUT ANGINA PECTORIS: ICD-10-CM

## 2024-08-19 PROCEDURE — 78451 HT MUSCLE IMAGE SPECT SING: CPT | Mod: 26,MC

## 2024-08-19 PROCEDURE — 93018 CV STRESS TEST I&R ONLY: CPT | Mod: GC,MC

## 2024-08-19 PROCEDURE — 93016 CV STRESS TEST SUPVJ ONLY: CPT | Mod: GC,MC

## 2024-08-25 ENCOUNTER — OUTPATIENT (OUTPATIENT)
Dept: OUTPATIENT SERVICES | Facility: HOSPITAL | Age: 84
LOS: 1 days | Discharge: HOME | End: 2024-08-25

## 2024-08-25 DIAGNOSIS — Z98.890 OTHER SPECIFIED POSTPROCEDURAL STATES: Chronic | ICD-10-CM

## 2024-08-25 DIAGNOSIS — C61 MALIGNANT NEOPLASM OF PROSTATE: ICD-10-CM

## 2024-08-28 ENCOUNTER — TRANSCRIPTION ENCOUNTER (OUTPATIENT)
Age: 84
End: 2024-08-28

## 2024-08-29 ENCOUNTER — APPOINTMENT (OUTPATIENT)
Dept: ORTHOPEDIC SURGERY | Facility: HOSPITAL | Age: 84
End: 2024-08-29

## 2024-08-29 ENCOUNTER — INPATIENT (INPATIENT)
Facility: HOSPITAL | Age: 84
LOS: 2 days | Discharge: HOME | DRG: 544 | End: 2024-09-01
Attending: ORTHOPAEDIC SURGERY | Admitting: ORTHOPAEDIC SURGERY
Payer: MEDICAID

## 2024-08-29 ENCOUNTER — RESULT REVIEW (OUTPATIENT)
Age: 84
End: 2024-08-29

## 2024-08-29 VITALS
SYSTOLIC BLOOD PRESSURE: 125 MMHG | DIASTOLIC BLOOD PRESSURE: 66 MMHG | WEIGHT: 130.95 LBS | HEART RATE: 83 BPM | HEIGHT: 61.81 IN | RESPIRATION RATE: 16 BRPM | TEMPERATURE: 98 F | OXYGEN SATURATION: 100 %

## 2024-08-29 DIAGNOSIS — Z98.890 OTHER SPECIFIED POSTPROCEDURAL STATES: Chronic | ICD-10-CM

## 2024-08-29 DIAGNOSIS — M84.454A PATHOLOGICAL FRACTURE, PELVIS, INITIAL ENCOUNTER FOR FRACTURE: ICD-10-CM

## 2024-08-29 LAB
ADD ON TEST-SPECIMEN IN LAB: SIGNIFICANT CHANGE UP
ADD ON TEST-SPECIMEN IN LAB: SIGNIFICANT CHANGE UP
ALBUMIN SERPL ELPH-MCNC: 3.4 G/DL — SIGNIFICANT CHANGE UP (ref 3.3–5)
ALP SERPL-CCNC: 84 U/L — SIGNIFICANT CHANGE UP (ref 40–120)
ALT FLD-CCNC: 13 U/L — SIGNIFICANT CHANGE UP (ref 10–45)
ANION GAP SERPL CALC-SCNC: 16 MMOL/L — SIGNIFICANT CHANGE UP (ref 5–17)
AST SERPL-CCNC: 29 U/L — SIGNIFICANT CHANGE UP (ref 10–40)
BILIRUB DIRECT SERPL-MCNC: <0.1 MG/DL — SIGNIFICANT CHANGE UP (ref 0–0.3)
BILIRUB INDIRECT FLD-MCNC: >0.3 MG/DL — SIGNIFICANT CHANGE UP (ref 0.2–1)
BILIRUB SERPL-MCNC: 0.4 MG/DL — SIGNIFICANT CHANGE UP (ref 0.2–1.2)
BUN SERPL-MCNC: 19 MG/DL — SIGNIFICANT CHANGE UP (ref 7–23)
CALCIUM SERPL-MCNC: 9.7 MG/DL — SIGNIFICANT CHANGE UP (ref 8.4–10.5)
CHLORIDE SERPL-SCNC: 101 MMOL/L — SIGNIFICANT CHANGE UP (ref 96–108)
CO2 SERPL-SCNC: 18 MMOL/L — LOW (ref 22–31)
CREAT SERPL-MCNC: 0.84 MG/DL — SIGNIFICANT CHANGE UP (ref 0.5–1.3)
EGFR: 86 ML/MIN/1.73M2 — SIGNIFICANT CHANGE UP
GAS PNL BLDA: SIGNIFICANT CHANGE UP
GLUCOSE BLDC GLUCOMTR-MCNC: 123 MG/DL — HIGH (ref 70–99)
GLUCOSE BLDC GLUCOMTR-MCNC: 194 MG/DL — HIGH (ref 70–99)
GLUCOSE BLDC GLUCOMTR-MCNC: 237 MG/DL — HIGH (ref 70–99)
GLUCOSE BLDC GLUCOMTR-MCNC: 251 MG/DL — HIGH (ref 70–99)
GLUCOSE SERPL-MCNC: 215 MG/DL — HIGH (ref 70–99)
HCT VFR BLD CALC: 31.3 % — LOW (ref 39–50)
HEPARINASE TEG R TIME: 4.9 MIN — SIGNIFICANT CHANGE UP (ref 4.3–8.3)
HEPARINASE TEG R TIME: 5.8 MIN — SIGNIFICANT CHANGE UP (ref 4.3–8.3)
HGB BLD-MCNC: 10.8 G/DL — LOW (ref 13–17)
LACTATE SERPL-SCNC: 1.7 MMOL/L — SIGNIFICANT CHANGE UP (ref 0.5–2)
MAGNESIUM SERPL-MCNC: 2 MG/DL — SIGNIFICANT CHANGE UP (ref 1.6–2.6)
MCHC RBC-ENTMCNC: 29.5 PG — SIGNIFICANT CHANGE UP (ref 27–34)
MCHC RBC-ENTMCNC: 34.5 GM/DL — SIGNIFICANT CHANGE UP (ref 32–36)
MCV RBC AUTO: 85.5 FL — SIGNIFICANT CHANGE UP (ref 80–100)
NRBC # BLD: 0 /100 WBCS — SIGNIFICANT CHANGE UP (ref 0–0)
PHOSPHATE SERPL-MCNC: 3.3 MG/DL — SIGNIFICANT CHANGE UP (ref 2.5–4.5)
PLATELET # BLD AUTO: 135 K/UL — LOW (ref 150–400)
POTASSIUM SERPL-MCNC: 4.3 MMOL/L — SIGNIFICANT CHANGE UP (ref 3.5–5.3)
POTASSIUM SERPL-SCNC: 4.3 MMOL/L — SIGNIFICANT CHANGE UP (ref 3.5–5.3)
PROT SERPL-MCNC: 5.4 G/DL — LOW (ref 6–8.3)
RAPIDTEG MAXIMUM AMPLITUDE: 52.5 MM — SIGNIFICANT CHANGE UP (ref 52–70)
RAPIDTEG MAXIMUM AMPLITUDE: 57.9 MM — SIGNIFICANT CHANGE UP (ref 52–70)
RBC # BLD: 3.66 M/UL — LOW (ref 4.2–5.8)
RBC # FLD: 13.1 % — SIGNIFICANT CHANGE UP (ref 10.3–14.5)
SODIUM SERPL-SCNC: 135 MMOL/L — SIGNIFICANT CHANGE UP (ref 135–145)
TEG FUNCTIONAL FIBRINOGEN: 16.1 MM — SIGNIFICANT CHANGE UP (ref 15–32)
TEG FUNCTIONAL FIBRINOGEN: 19.8 MM — SIGNIFICANT CHANGE UP (ref 15–32)
TEG MAXIMUM AMPLITUDE: 51.2 MM — LOW (ref 52–69)
TEG MAXIMUM AMPLITUDE: 58.6 MM — SIGNIFICANT CHANGE UP (ref 52–69)
TEG REACTION TIME: 5.2 MIN — SIGNIFICANT CHANGE UP (ref 4.6–9.1)
TEG REACTION TIME: 9.3 MIN — HIGH (ref 4.6–9.1)
WBC # BLD: 6.64 K/UL — SIGNIFICANT CHANGE UP (ref 3.8–10.5)
WBC # FLD AUTO: 6.64 K/UL — SIGNIFICANT CHANGE UP (ref 3.8–10.5)

## 2024-08-29 PROCEDURE — 72170 X-RAY EXAM OF PELVIS: CPT | Mod: 26

## 2024-08-29 PROCEDURE — 27076 RESECT HIP TUM INCL ACETABUL: CPT | Mod: LT

## 2024-08-29 PROCEDURE — 73551 X-RAY EXAM OF FEMUR 1: CPT | Mod: 26,LT

## 2024-08-29 PROCEDURE — 88305 TISSUE EXAM BY PATHOLOGIST: CPT | Mod: 26

## 2024-08-29 PROCEDURE — 88311 DECALCIFY TISSUE: CPT | Mod: 26

## 2024-08-29 PROCEDURE — 88341 IMHCHEM/IMCYTCHM EA ADD ANTB: CPT | Mod: 26

## 2024-08-29 PROCEDURE — 27130 TOTAL HIP ARTHROPLASTY: CPT | Mod: 22,LT

## 2024-08-29 PROCEDURE — 88342 IMHCHEM/IMCYTCHM 1ST ANTB: CPT | Mod: 26

## 2024-08-29 PROCEDURE — 88307 TISSUE EXAM BY PATHOLOGIST: CPT | Mod: 26

## 2024-08-29 DEVICE — ADAPTER TPR MINUS 6 BIOLOX: Type: IMPLANTABLE DEVICE | Site: LEFT | Status: FUNCTIONAL

## 2024-08-29 DEVICE — KIT CVC 2LUM MAC 9FR CHG: Type: IMPLANTABLE DEVICE | Site: LEFT | Status: FUNCTIONAL

## 2024-08-29 DEVICE — CEMENT SIMPLEX P 40GM: Type: IMPLANTABLE DEVICE | Site: LEFT | Status: FUNCTIONAL

## 2024-08-29 DEVICE — IMPLANTABLE DEVICE: Type: IMPLANTABLE DEVICE | Site: LEFT | Status: FUNCTIONAL

## 2024-08-29 DEVICE — SHELL ACET G7 OSSEOTI C HEMISPHR 3 HOLE 48MM: Type: IMPLANTABLE DEVICE | Site: LEFT | Status: FUNCTIONAL

## 2024-08-29 DEVICE — KIT A-LINE 1LUM 20G X 12CM SAFE KIT: Type: IMPLANTABLE DEVICE | Site: LEFT | Status: FUNCTIONAL

## 2024-08-29 DEVICE — SCREW SELF TAP 6.5X30MM: Type: IMPLANTABLE DEVICE | Site: LEFT | Status: FUNCTIONAL

## 2024-08-29 DEVICE — SCREW ACET SELF TAP 6.5X20MM: Type: IMPLANTABLE DEVICE | Site: LEFT | Status: FUNCTIONAL

## 2024-08-29 DEVICE — LINER ACET VIT E G7 NEUT SZ C 32MM: Type: IMPLANTABLE DEVICE | Site: LEFT | Status: FUNCTIONAL

## 2024-08-29 DEVICE — SCREW S-T 6.5X15MM: Type: IMPLANTABLE DEVICE | Site: LEFT | Status: FUNCTIONAL

## 2024-08-29 RX ORDER — DEXTROSE 15 G/33 G
25 GEL IN PACKET (GRAM) ORAL ONCE
Refills: 0 | Status: DISCONTINUED | OUTPATIENT
Start: 2024-08-29 | End: 2024-09-01

## 2024-08-29 RX ORDER — KETOROLAC TROMETHAMINE 30 MG/ML
15 INJECTION, SOLUTION INTRAMUSCULAR EVERY 6 HOURS
Refills: 0 | Status: DISCONTINUED | OUTPATIENT
Start: 2024-08-29 | End: 2024-08-30

## 2024-08-29 RX ORDER — POLYETHYLENE GLYCOL 3350 17 G/17G
17 POWDER, FOR SOLUTION ORAL AT BEDTIME
Refills: 0 | Status: DISCONTINUED | OUTPATIENT
Start: 2024-08-29 | End: 2024-09-01

## 2024-08-29 RX ORDER — LEVOTHYROXINE SODIUM 100 MCG
25 TABLET ORAL DAILY
Refills: 0 | Status: DISCONTINUED | OUTPATIENT
Start: 2024-08-29 | End: 2024-09-01

## 2024-08-29 RX ORDER — ASPIRIN 81 MG
81 TABLET, DELAYED RELEASE (ENTERIC COATED) ORAL
Refills: 0 | Status: DISCONTINUED | OUTPATIENT
Start: 2024-08-29 | End: 2024-08-29

## 2024-08-29 RX ORDER — PANTOPRAZOLE SODIUM 40 MG
40 TABLET, DELAYED RELEASE (ENTERIC COATED) ORAL
Refills: 0 | Status: DISCONTINUED | OUTPATIENT
Start: 2024-08-29 | End: 2024-09-01

## 2024-08-29 RX ORDER — BICALUTAMIDE 50 MG/1
1 TABLET, FILM COATED ORAL
Refills: 0 | DISCHARGE

## 2024-08-29 RX ORDER — LIDOCAINE HCL 20 MG/ML
0.2 VIAL (ML) INJECTION ONCE
Refills: 0 | Status: DISCONTINUED | OUTPATIENT
Start: 2024-08-29 | End: 2024-08-29

## 2024-08-29 RX ORDER — CRANBERRY FRUIT EXTRACT 650 MG
2 CAPSULE ORAL DAILY
Refills: 0 | Status: DISCONTINUED | OUTPATIENT
Start: 2024-08-29 | End: 2024-09-01

## 2024-08-29 RX ORDER — ACETAMINOPHEN 325 MG/1
1000 TABLET ORAL ONCE
Refills: 0 | Status: COMPLETED | OUTPATIENT
Start: 2024-08-29 | End: 2024-08-30

## 2024-08-29 RX ORDER — BENZOCAINE/MENTHOL 20 %-0.5 %
1 AEROSOL (GRAM) TOPICAL EVERY 6 HOURS
Refills: 0 | Status: DISCONTINUED | OUTPATIENT
Start: 2024-08-29 | End: 2024-09-01

## 2024-08-29 RX ORDER — ASPIRIN 81 MG
81 TABLET, DELAYED RELEASE (ENTERIC COATED) ORAL
Refills: 0 | Status: DISCONTINUED | OUTPATIENT
Start: 2024-08-29 | End: 2024-09-01

## 2024-08-29 RX ORDER — ABIRATERONE 250 MG/1
2 TABLET ORAL
Refills: 0 | DISCHARGE

## 2024-08-29 RX ORDER — DEXTROSE 15 G/33 G
15 GEL IN PACKET (GRAM) ORAL ONCE
Refills: 0 | Status: DISCONTINUED | OUTPATIENT
Start: 2024-08-29 | End: 2024-09-01

## 2024-08-29 RX ORDER — CHLORHEXIDINE GLUCONATE 40 MG/ML
1 SOLUTION TOPICAL ONCE
Refills: 0 | Status: DISCONTINUED | OUTPATIENT
Start: 2024-08-29 | End: 2024-08-29

## 2024-08-29 RX ORDER — BICALUTAMIDE 50 MG/1
50 TABLET, FILM COATED ORAL DAILY
Refills: 0 | Status: DISCONTINUED | OUTPATIENT
Start: 2024-08-29 | End: 2024-09-01

## 2024-08-29 RX ORDER — SODIUM CHLORIDE 9 MG/ML
500 INJECTION INTRAMUSCULAR; INTRAVENOUS; SUBCUTANEOUS ONCE
Refills: 0 | Status: COMPLETED | OUTPATIENT
Start: 2024-08-29 | End: 2024-08-29

## 2024-08-29 RX ORDER — ACETAMINOPHEN 325 MG/1
975 TABLET ORAL EVERY 8 HOURS
Refills: 0 | Status: DISCONTINUED | OUTPATIENT
Start: 2024-08-29 | End: 2024-09-01

## 2024-08-29 RX ORDER — SODIUM CHLORIDE 9 MG/ML
500 INJECTION INTRAMUSCULAR; INTRAVENOUS; SUBCUTANEOUS ONCE
Refills: 0 | Status: COMPLETED | OUTPATIENT
Start: 2024-08-29 | End: 2024-08-30

## 2024-08-29 RX ORDER — SODIUM CHLORIDE 9 MG/ML
3 INJECTION INTRAMUSCULAR; INTRAVENOUS; SUBCUTANEOUS EVERY 8 HOURS
Refills: 0 | Status: DISCONTINUED | OUTPATIENT
Start: 2024-08-29 | End: 2024-08-29

## 2024-08-29 RX ORDER — ONDANSETRON 2 MG/ML
4 INJECTION, SOLUTION INTRAMUSCULAR; INTRAVENOUS ONCE
Refills: 0 | Status: DISCONTINUED | OUTPATIENT
Start: 2024-08-29 | End: 2024-08-29

## 2024-08-29 RX ORDER — ONDANSETRON 2 MG/ML
4 INJECTION, SOLUTION INTRAMUSCULAR; INTRAVENOUS EVERY 6 HOURS
Refills: 0 | Status: DISCONTINUED | OUTPATIENT
Start: 2024-08-29 | End: 2024-09-01

## 2024-08-29 RX ORDER — FENOFIBRATE 145 MG/1
145 TABLET, FILM COATED ORAL DAILY
Refills: 0 | Status: DISCONTINUED | OUTPATIENT
Start: 2024-08-29 | End: 2024-09-01

## 2024-08-29 RX ORDER — GLUCAGON INJECTION, SOLUTION 1 MG/.2ML
1 INJECTION, SOLUTION SUBCUTANEOUS ONCE
Refills: 0 | Status: DISCONTINUED | OUTPATIENT
Start: 2024-08-29 | End: 2024-09-01

## 2024-08-29 RX ORDER — CELECOXIB 400 MG/1
200 CAPSULE ORAL EVERY 12 HOURS
Refills: 0 | Status: DISCONTINUED | OUTPATIENT
Start: 2024-08-29 | End: 2024-08-29

## 2024-08-29 RX ORDER — OXYCODONE HYDROCHLORIDE 5 MG/1
10 TABLET ORAL EVERY 4 HOURS
Refills: 0 | Status: DISCONTINUED | OUTPATIENT
Start: 2024-08-29 | End: 2024-08-30

## 2024-08-29 RX ORDER — PANTOPRAZOLE SODIUM 40 MG
40 TABLET, DELAYED RELEASE (ENTERIC COATED) ORAL ONCE
Refills: 0 | Status: COMPLETED | OUTPATIENT
Start: 2024-08-29 | End: 2024-08-29

## 2024-08-29 RX ORDER — SENNA 187 MG
2 TABLET ORAL AT BEDTIME
Refills: 0 | Status: DISCONTINUED | OUTPATIENT
Start: 2024-08-29 | End: 2024-09-01

## 2024-08-29 RX ORDER — HYDROMORPHONE HYDROCHLORIDE 2 MG/1
0.25 TABLET ORAL
Refills: 0 | Status: DISCONTINUED | OUTPATIENT
Start: 2024-08-29 | End: 2024-08-29

## 2024-08-29 RX ORDER — CEFAZOLIN SODIUM 2 G/100ML
2000 INJECTION, SOLUTION INTRAVENOUS EVERY 8 HOURS
Refills: 0 | Status: COMPLETED | OUTPATIENT
Start: 2024-08-29 | End: 2024-08-30

## 2024-08-29 RX ORDER — OXYCODONE HYDROCHLORIDE 5 MG/1
5 TABLET ORAL EVERY 4 HOURS
Refills: 0 | Status: DISCONTINUED | OUTPATIENT
Start: 2024-08-29 | End: 2024-08-30

## 2024-08-29 RX ORDER — TRAMADOL HYDROCHLORIDE 200 MG/1
50 TABLET, EXTENDED RELEASE ORAL EVERY 6 HOURS
Refills: 0 | Status: DISCONTINUED | OUTPATIENT
Start: 2024-08-29 | End: 2024-08-30

## 2024-08-29 RX ORDER — CELECOXIB 400 MG/1
200 CAPSULE ORAL EVERY 12 HOURS
Refills: 0 | Status: DISCONTINUED | OUTPATIENT
Start: 2024-08-30 | End: 2024-09-01

## 2024-08-29 RX ORDER — DEXTROSE 15 G/33 G
12.5 GEL IN PACKET (GRAM) ORAL ONCE
Refills: 0 | Status: DISCONTINUED | OUTPATIENT
Start: 2024-08-29 | End: 2024-09-01

## 2024-08-29 RX ORDER — TRAMADOL HYDROCHLORIDE 200 MG/1
50 TABLET, EXTENDED RELEASE ORAL ONCE
Refills: 0 | Status: DISCONTINUED | OUTPATIENT
Start: 2024-08-29 | End: 2024-08-29

## 2024-08-29 RX ADMIN — KETOROLAC TROMETHAMINE 15 MILLIGRAM(S): 30 INJECTION, SOLUTION INTRAMUSCULAR at 23:01

## 2024-08-29 RX ADMIN — ONDANSETRON 4 MILLIGRAM(S): 2 INJECTION, SOLUTION INTRAMUSCULAR; INTRAVENOUS at 20:31

## 2024-08-29 RX ADMIN — KETOROLAC TROMETHAMINE 15 MILLIGRAM(S): 30 INJECTION, SOLUTION INTRAMUSCULAR at 18:42

## 2024-08-29 RX ADMIN — Medication 81 MILLIGRAM(S): at 18:27

## 2024-08-29 RX ADMIN — Medication 10 MILLIGRAM(S): at 15:55

## 2024-08-29 RX ADMIN — TRAMADOL HYDROCHLORIDE 50 MILLIGRAM(S): 200 TABLET, EXTENDED RELEASE ORAL at 07:49

## 2024-08-29 RX ADMIN — ACETAMINOPHEN 975 MILLIGRAM(S): 325 TABLET ORAL at 21:26

## 2024-08-29 RX ADMIN — Medication 40 MILLIGRAM(S): at 07:49

## 2024-08-29 RX ADMIN — CEFAZOLIN SODIUM 100 MILLIGRAM(S): 2 INJECTION, SOLUTION INTRAVENOUS at 21:26

## 2024-08-29 RX ADMIN — ACETAMINOPHEN 975 MILLIGRAM(S): 325 TABLET ORAL at 21:56

## 2024-08-29 RX ADMIN — SODIUM CHLORIDE 500 MILLILITER(S): 9 INJECTION INTRAMUSCULAR; INTRAVENOUS; SUBCUTANEOUS at 15:10

## 2024-08-29 RX ADMIN — POLYETHYLENE GLYCOL 3350 17 GRAM(S): 17 POWDER, FOR SOLUTION ORAL at 21:26

## 2024-08-29 RX ADMIN — TRAMADOL HYDROCHLORIDE 50 MILLIGRAM(S): 200 TABLET, EXTENDED RELEASE ORAL at 08:19

## 2024-08-29 RX ADMIN — KETOROLAC TROMETHAMINE 15 MILLIGRAM(S): 30 INJECTION, SOLUTION INTRAMUSCULAR at 23:31

## 2024-08-29 RX ADMIN — Medication 3: at 16:55

## 2024-08-29 RX ADMIN — Medication 2 TABLET(S): at 21:25

## 2024-08-29 RX ADMIN — KETOROLAC TROMETHAMINE 15 MILLIGRAM(S): 30 INJECTION, SOLUTION INTRAMUSCULAR at 18:27

## 2024-08-29 RX ADMIN — SODIUM CHLORIDE 500 MILLILITER(S): 9 INJECTION INTRAMUSCULAR; INTRAVENOUS; SUBCUTANEOUS at 20:10

## 2024-08-29 RX ADMIN — Medication 1 LOZENGE: at 17:31

## 2024-08-29 NOTE — PATIENT PROFILE ADULT - FALL HARM RISK - HARM RISK INTERVENTIONS
Assistance with ambulation/Assistance OOB with selected safe patient handling equipment/Communicate Risk of Fall with Harm to all staff/Discuss with provider need for PT consult/Monitor gait and stability/Provide patient with walking aids - walker, cane, crutches/Reinforce activity limits and safety measures with patient and family/Sit up slowly, dangle for a short time, stand at bedside before walking/Tailored Fall Risk Interventions/Use of alarms - bed, chair and/or voice tab/Visual Cue: Yellow wristband and red socks/Bed in lowest position, wheels locked, appropriate side rails in place/Call bell, personal items and telephone in reach/Instruct patient to call for assistance before getting out of bed or chair/Non-slip footwear when patient is out of bed/Council to call system/Physically safe environment - no spills, clutter or unnecessary equipment/Purposeful Proactive Rounding/Room/bathroom lighting operational, light cord in reach

## 2024-08-29 NOTE — CHART NOTE - NSCHARTNOTEFT_GEN_A_CORE
Resting without complaints.  No Chest Pain, SOB, N/V.    T(C): 36.5 (08-29-24 @ 14:50), Max: 36.7 (08-29-24 @ 07:05)  HR: 87 (08-29-24 @ 15:30) (82 - 88)  BP: 162/74 (08-29-24 @ 15:30) (125/66 - 162/74)  RR: 18 (08-29-24 @ 15:30) (14 - 20)  SpO2: 96% (08-29-24 @ 15:30) (94% - 100%)      Exam:  Alert and [place], No Acute Distress  Card: +S1/S2, RRR  Pulm: CTAB  Laterality: L Hip  Aquacel x3 c/d/i  Calves soft, non-tender bilaterally  +PF/DF/EHL/FHL  SILT  + DP pulse    Xray: S/P L Pelvic Cage and Total Hip Arthroplasty, prosthesis in place and intact with no signs of new Fx.                          10.8   6.64  )-----------( 135      ( 29 Aug 2024 15:18 )             31.3            A/P: 84y Male S/p L Total Hip Arthroplasty Anterior Approach. VSS. NAD  -PT/OT-WBAT LLE with Anterior Hip Precautions  -FU OR Path  -FU AM labs tomorrow  -Remove Srivastava tomorrow for trial of void  -IS  -DVT PPx: ASA 81mg PO BID, SCDs, early OOB and ambulation  -Pain Control  -Continue Current Tx  -Dispo planning PACU to Floor    Howie Vo PA-C  Orthopedic Surgery Team  Team Pager #[phone]

## 2024-08-29 NOTE — PRE-ANESTHESIA EVALUATION ADULT - NSANTHPMHFT_GEN_ALL_CORE
84M PMH HLD, hypothyroidism, T2DM (prev on Farxiga, no longer taking), metastatic prostate CA to bone.  No CP/SOB.  TTE: normal biventricular function.  NST: wnl.

## 2024-08-29 NOTE — BRIEF OPERATIVE NOTE - NSICDXBRIEFPROCEDURE_GEN_ALL_CORE_FT
PROCEDURES:  Left total hip arthroplasty 29-Aug-2024 14:39:39 Complex L KRYSTINA, removal of tumor from acetabulum Jose Peace

## 2024-08-29 NOTE — PRE-ANESTHESIA EVALUATION ADULT - NSPROPOSEDPROCEDFT_GEN_ALL_CORE
Left hip exploration, acetabular tumor resection, cementation  complex left total hip arthroplasty with cage

## 2024-08-30 ENCOUNTER — TRANSCRIPTION ENCOUNTER (OUTPATIENT)
Age: 84
End: 2024-08-30

## 2024-08-30 LAB
ANION GAP SERPL CALC-SCNC: 15 MMOL/L — SIGNIFICANT CHANGE UP (ref 5–17)
BUN SERPL-MCNC: 23 MG/DL — SIGNIFICANT CHANGE UP (ref 7–23)
CALCIUM SERPL-MCNC: 8.7 MG/DL — SIGNIFICANT CHANGE UP (ref 8.4–10.5)
CHLORIDE SERPL-SCNC: 100 MMOL/L — SIGNIFICANT CHANGE UP (ref 96–108)
CO2 SERPL-SCNC: 20 MMOL/L — LOW (ref 22–31)
CREAT SERPL-MCNC: 1.13 MG/DL — SIGNIFICANT CHANGE UP (ref 0.5–1.3)
EGFR: 64 ML/MIN/1.73M2 — SIGNIFICANT CHANGE UP
GLUCOSE BLDC GLUCOMTR-MCNC: 178 MG/DL — HIGH (ref 70–99)
GLUCOSE BLDC GLUCOMTR-MCNC: 179 MG/DL — HIGH (ref 70–99)
GLUCOSE BLDC GLUCOMTR-MCNC: 199 MG/DL — HIGH (ref 70–99)
GLUCOSE BLDC GLUCOMTR-MCNC: 229 MG/DL — HIGH (ref 70–99)
GLUCOSE SERPL-MCNC: 138 MG/DL — HIGH (ref 70–99)
HCT VFR BLD CALC: 27.8 % — LOW (ref 39–50)
HGB BLD-MCNC: 9.8 G/DL — LOW (ref 13–17)
MCHC RBC-ENTMCNC: 30.2 PG — SIGNIFICANT CHANGE UP (ref 27–34)
MCHC RBC-ENTMCNC: 35.3 GM/DL — SIGNIFICANT CHANGE UP (ref 32–36)
MCV RBC AUTO: 85.5 FL — SIGNIFICANT CHANGE UP (ref 80–100)
NRBC # BLD: 0 /100 WBCS — SIGNIFICANT CHANGE UP (ref 0–0)
PLATELET # BLD AUTO: 111 K/UL — LOW (ref 150–400)
POTASSIUM SERPL-MCNC: 4.1 MMOL/L — SIGNIFICANT CHANGE UP (ref 3.5–5.3)
POTASSIUM SERPL-SCNC: 4.1 MMOL/L — SIGNIFICANT CHANGE UP (ref 3.5–5.3)
RBC # BLD: 3.25 M/UL — LOW (ref 4.2–5.8)
RBC # FLD: 13.3 % — SIGNIFICANT CHANGE UP (ref 10.3–14.5)
SODIUM SERPL-SCNC: 135 MMOL/L — SIGNIFICANT CHANGE UP (ref 135–145)
WBC # BLD: 3.93 K/UL — SIGNIFICANT CHANGE UP (ref 3.8–10.5)
WBC # FLD AUTO: 3.93 K/UL — SIGNIFICANT CHANGE UP (ref 3.8–10.5)

## 2024-08-30 RX ORDER — SODIUM CHLORIDE 9 MG/ML
1000 INJECTION INTRAMUSCULAR; INTRAVENOUS; SUBCUTANEOUS
Refills: 0 | Status: DISCONTINUED | OUTPATIENT
Start: 2024-08-30 | End: 2024-09-01

## 2024-08-30 RX ADMIN — ONDANSETRON 4 MILLIGRAM(S): 2 INJECTION, SOLUTION INTRAMUSCULAR; INTRAVENOUS at 06:28

## 2024-08-30 RX ADMIN — CEFAZOLIN SODIUM 100 MILLIGRAM(S): 2 INJECTION, SOLUTION INTRAVENOUS at 05:34

## 2024-08-30 RX ADMIN — SODIUM CHLORIDE 500 MILLILITER(S): 9 INJECTION INTRAMUSCULAR; INTRAVENOUS; SUBCUTANEOUS at 05:34

## 2024-08-30 RX ADMIN — Medication 2: at 17:23

## 2024-08-30 RX ADMIN — Medication 25 MICROGRAM(S): at 05:35

## 2024-08-30 RX ADMIN — ACETAMINOPHEN 400 MILLIGRAM(S): 325 TABLET ORAL at 23:25

## 2024-08-30 RX ADMIN — KETOROLAC TROMETHAMINE 15 MILLIGRAM(S): 30 INJECTION, SOLUTION INTRAMUSCULAR at 11:28

## 2024-08-30 RX ADMIN — KETOROLAC TROMETHAMINE 15 MILLIGRAM(S): 30 INJECTION, SOLUTION INTRAMUSCULAR at 05:35

## 2024-08-30 RX ADMIN — Medication 40 MILLIGRAM(S): at 05:35

## 2024-08-30 RX ADMIN — CELECOXIB 200 MILLIGRAM(S): 400 CAPSULE ORAL at 18:08

## 2024-08-30 RX ADMIN — SODIUM CHLORIDE 40 MILLILITER(S): 9 INJECTION INTRAMUSCULAR; INTRAVENOUS; SUBCUTANEOUS at 11:30

## 2024-08-30 RX ADMIN — Medication 1: at 08:31

## 2024-08-30 RX ADMIN — ONDANSETRON 4 MILLIGRAM(S): 2 INJECTION, SOLUTION INTRAMUSCULAR; INTRAVENOUS at 20:36

## 2024-08-30 RX ADMIN — CELECOXIB 200 MILLIGRAM(S): 400 CAPSULE ORAL at 17:22

## 2024-08-30 RX ADMIN — BICALUTAMIDE 50 MILLIGRAM(S): 50 TABLET, FILM COATED ORAL at 12:37

## 2024-08-30 RX ADMIN — Medication 2 GRAM(S): at 11:27

## 2024-08-30 RX ADMIN — ACETAMINOPHEN 975 MILLIGRAM(S): 325 TABLET ORAL at 06:04

## 2024-08-30 RX ADMIN — Medication 81 MILLIGRAM(S): at 17:22

## 2024-08-30 RX ADMIN — FENOFIBRATE 145 MILLIGRAM(S): 145 TABLET, FILM COATED ORAL at 11:27

## 2024-08-30 RX ADMIN — CELECOXIB 200 MILLIGRAM(S): 400 CAPSULE ORAL at 06:04

## 2024-08-30 RX ADMIN — CELECOXIB 200 MILLIGRAM(S): 400 CAPSULE ORAL at 05:35

## 2024-08-30 RX ADMIN — ACETAMINOPHEN 975 MILLIGRAM(S): 325 TABLET ORAL at 05:34

## 2024-08-30 RX ADMIN — ACETAMINOPHEN 975 MILLIGRAM(S): 325 TABLET ORAL at 13:11

## 2024-08-30 RX ADMIN — Medication 1 TABLET(S): at 11:27

## 2024-08-30 RX ADMIN — KETOROLAC TROMETHAMINE 15 MILLIGRAM(S): 30 INJECTION, SOLUTION INTRAMUSCULAR at 06:05

## 2024-08-30 RX ADMIN — ACETAMINOPHEN 975 MILLIGRAM(S): 325 TABLET ORAL at 14:11

## 2024-08-30 RX ADMIN — Medication 81 MILLIGRAM(S): at 05:35

## 2024-08-30 RX ADMIN — Medication 1: at 12:36

## 2024-08-30 RX ADMIN — KETOROLAC TROMETHAMINE 15 MILLIGRAM(S): 30 INJECTION, SOLUTION INTRAMUSCULAR at 11:58

## 2024-08-30 NOTE — OCCUPATIONAL THERAPY INITIAL EVALUATION ADULT - RANGE OF MOTION EXAMINATION, UPPER EXTREMITY
[address]  Discharge- Sky Loss 1935, 80 y.o. female MRN: [id number]  Unit/Bed#: -[facility]on[facility] Encounter: [id number]  Primary Care Provider: Paul Gleason [facility])  Assessment & Plan  • RRT [facility])  [facility])  Enrique Cerna is a 80 y.o. female patient bilateral UE Active ROM was WFL  (within functional limits)

## 2024-08-30 NOTE — DISCHARGE NOTE NURSING/CASE MANAGEMENT/SOCIAL WORK - NSDCPEFALRISK_GEN_ALL_CORE
For information on Fall & Injury Prevention, visit: [email or web address] OR  [email or web address] OR  https://www.cdc.gov/steadi/patient.html

## 2024-08-30 NOTE — DISCHARGE NOTE NURSING/CASE MANAGEMENT/SOCIAL WORK - PATIENT PORTAL LINK FT
You can access the FollowMyHealth Patient Portal offered by [facility] by registering at the following website: [email or web address]. By joining [employer]’s FollowMyHealth portal, you will also be able to view your health information using other applications (apps) compatible with our system.

## 2024-08-30 NOTE — PHYSICAL THERAPY INITIAL EVALUATION ADULT - LEVEL OF INDEPENDENCE: GAIT, REHAB EVAL
contact guard No Residual Tumor Seen Histology Text: There were no malignant cells seen in the sections examined.

## 2024-08-30 NOTE — DISCHARGE NOTE PROVIDER - NSDCFUADDINST_GEN_ALL_CORE_FT
XXXXX 1. Keep Aquacel dressing clean and dry   2. ice to affected incision every 4-6 hours x 72 hours   3. You may be Weight bearing as tolerated on the L Leg - Anterior Precautions  4. Continue ECOTRIN 81 mg by mouth 2x / day (at breakfast and at dinner) for a total of 6 WEEKS   5. May shower; protect Aquacel dressing with water-tight seal  i.e. saran wrap; pat dry   6. Follow up with Dr Bonilla in 2 WEEKS for dressing REMOVAL, wound check, and staple/suture removal (if applicable)   Please call for an appointment  1. Keep Aquacel dressing clean and dry   2. ice to affected incision every 4-6 hours x 72 hours   3. You may be Weight bearing as tolerated on the L Leg - Anterior Precautions  4. Continue ECOTRIN 81 mg by mouth 2x / day (at breakfast and at dinner) for a total of 6 WEEKS   4a. Resume your Home Diabetic Rx  5. May shower; protect Aquacel dressing with water-tight seal  i.e. saran wrap; pat dry   6. Follow up with Dr Bonilla in 2 WEEKS for dressing REMOVAL, wound check, and staple/suture removal (if applicable)   Please call for an appointment  1. Keep Aquacel dressing clean and dry   2. ice to affected incision every 4-6 hours x 72 hours   2a. Continue to elevate your scrotum x 72 hrs (with or without ICE)  3. You may be Weight bearing as tolerated on the L Leg - Anterior Precautions  4. Start Eliquis 2.5 mg by mouth 2x / day (at breakfast and at dinner) for a total of 6 WEEKS    NOTE:  You will be prescribed a 30 day supply of ELIQUIS               **** during your visit to Dr Bonilla, ask your SURGEON if he wants an ADDITIONAL TWO (2) weeks of Eliquis  5.  Resume your Home Diabetic Rx  6. May shower; protect Aquacel dressing with water-tight seal  i.e. saran wrap; pat dry   6. Follow up with Dr Bonilla in 2 WEEKS for dressing REMOVAL, wound check, and staple/suture removal (if applicable)   Please call for an appointment

## 2024-08-30 NOTE — OCCUPATIONAL THERAPY INITIAL EVALUATION ADULT - STRENGTHENING, PT EVAL
GOAL: pt. will increase strength by 1/2 grade in order to increase independence with functional transfers and mobility

## 2024-08-30 NOTE — DISCHARGE NOTE PROVIDER - NSDCMRMEDTOKEN_GEN_ALL_CORE_FT
abiraterone 500 mg oral tablet: 2 tab(s) orally once a day  bicalutamide 50 mg oral tablet: 1 tab(s) orally once a day  fenofibrate 145 mg oral tablet: 1 tab(s) orally  levothyroxine 25 mcg (0.025 mg) oral tablet: 1 tab(s) orally  metFORMIN 500 mg oral tablet: 1 tab(s) orally 2 times a day  Multiple Vitamins oral tablet: 1 tab(s) orally once a day  Omega-3 350 mg oral capsule: 1 cap(s) orally   abiraterone 500 mg oral tablet: 2 tab(s) orally once a day  acetaminophen 325 mg oral tablet: 3 tab(s) orally every 8 hours  bicalutamide 50 mg oral tablet: 1 tab(s) orally once a day  fenofibrate 145 mg oral tablet: 1 tab(s) orally  levothyroxine 25 mcg (0.025 mg) oral tablet: 1 tab(s) orally  metFORMIN 500 mg oral tablet: 1 tab(s) orally 2 times a day  Multiple Vitamins oral tablet: 1 tab(s) orally once a day  pantoprazole 40 mg oral delayed release tablet: 1 tab(s) orally once a day (before a meal)  polyethylene glycol 3350 oral powder for reconstitution: 17 gram(s) orally once a day (at bedtime) while on pain medications  traMADol 50 mg oral tablet: 1 tab(s) orally every 6 hours as needed for severe pain MDD: 4   abiraterone 500 mg oral tablet: 2 tab(s) orally once a day  acetaminophen 325 mg oral tablet: 3 tab(s) orally every 8 hours  aspirin 81 mg oral delayed release tablet: 1 tab(s) orally 2 times a day x 6 WEEKS Total (blood thinner) then STOP  bicalutamide 50 mg oral tablet: 1 tab(s) orally once a day  fenofibrate 145 mg oral tablet: 1 tab(s) orally  levothyroxine 25 mcg (0.025 mg) oral tablet: 1 tab(s) orally  metFORMIN 500 mg oral tablet: 1 tab(s) orally 2 times a day  Multiple Vitamins oral tablet: 1 tab(s) orally once a day  pantoprazole 40 mg oral delayed release tablet: 1 tab(s) orally once a day (before a meal)  polyethylene glycol 3350 oral powder for reconstitution: 17 gram(s) orally once a day (at bedtime) while on pain medications  traMADol 50 mg oral tablet: 1 tab(s) orally every 6 hours as needed for severe pain MDD: 4   abiraterone 500 mg oral tablet: 2 tab(s) orally once a day  acetaminophen 325 mg oral tablet: 3 tab(s) orally every 8 hours  apixaban 2.5 mg oral tablet: 1 tab(s) orally every 12 hours  bicalutamide 50 mg oral tablet: 1 tab(s) orally once a day  fenofibrate 145 mg oral tablet: 1 tab(s) orally  levothyroxine 25 mcg (0.025 mg) oral tablet: 1 tab(s) orally  metFORMIN 500 mg oral tablet: 1 tab(s) orally 2 times a day  Multiple Vitamins oral tablet: 1 tab(s) orally once a day  pantoprazole 40 mg oral delayed release tablet: 1 tab(s) orally once a day (before a meal)  polyethylene glycol 3350 oral powder for reconstitution: 17 gram(s) orally once a day (at bedtime) while on pain medications  traMADol 50 mg oral tablet: 1 tab(s) orally every 6 hours as needed for severe pain MDD: 4

## 2024-08-30 NOTE — DISCHARGE NOTE NURSING/CASE MANAGEMENT/SOCIAL WORK - NSDCFUADDAPPT_GEN_ALL_CORE_FT
Follow up with your primary care physician regarding your hospitalization and changes to medications within one month of your discharge.

## 2024-08-30 NOTE — DISCHARGE NOTE PROVIDER - ATTENDING ATTESTATION STATEMENT
Patient calling and states [place] [address] wont have her medication in till next week. She would like it sent to [facility] on Noland Hospital Dothan I have personally seen and examined the patient. I have collaborated with and supervised the

## 2024-08-30 NOTE — PROGRESS NOTE ADULT - ASSESSMENT
A/P: 83 y/o M POD#1 s/p L KRYSTINA w cage/L acetabular tumor resection      DVT ppx- [id number] PO BID  PT  OT  LLE WBAT/Anterior hip precautions  Pain management prn with minimal nrarcotics  Gi ppx  D/W attending        SHAHBAZ Monge  Orthopedic Surgery  [phone]

## 2024-08-30 NOTE — PHYSICAL THERAPY INITIAL EVALUATION ADULT - PERTINENT HX OF CURRENT PROBLEM, REHAB EVAL
Pt is a 85 y/o male admitted to [facility] on 8/29/24 with history of HLD, Hypothyroidism, T2Dm, prostate cancer since 2019, s/p RT, found to have diffuse skeletal mets. Mass seen on left acetabulum/ilium now for resection and Left hip arthroplasty with cage. Pt with painful walking using walker at home. Patient denies pain at rest but c/o BLE edema.  XR pelvis: Status post ORIF of the left hip with acetabular hardware and a long femoral stem. No evidence of acute complication. Adjacent postsurgical changes within the soft tissues. The bones are diffusely osteopenic. The right hip is well situated within the acetabulum background of mild to moderate degenerative changes. Pt is now s/p L KRYSTINA w cage/L acetabular tumor resection

## 2024-08-30 NOTE — CONSULT NOTE ADULT - ASSESSMENT
s/p KRYSTINA  pain control   fu with ortho    DM II  Monitor finger stick. Insulin coverage. Diabetic education and Diabetic diet. Consider nutrition consultation.    DVT proph  on asa as per ortho protocol     encounter post surgery   HD stable  monitor H/H      Advanced care planning was discussed with patient and family.  Risks, benefits and alternatives of the cardiac treatments and medical therapy including procedures were discussed in detail and all questions were answered. Importance of compliance with medical therapy and lifestyle modification to improve cardiovascular health were addressed. Appropriate forms and patient educational materials were reviewed. 30 minutes face to face spent.

## 2024-08-30 NOTE — DISCHARGE NOTE PROVIDER - NSDCCPTREATMENT_GEN_ALL_CORE_FT
PRINCIPAL PROCEDURE  Procedure: Left total hip arthroplasty  Findings and Treatment: Complex L KRYSTINA, removal of tumor from acetabulum

## 2024-08-30 NOTE — PHYSICAL THERAPY INITIAL EVALUATION ADULT - GAIT DEVIATIONS NOTED, PT EVAL
decreased sinan/decreased velocity of limb motion/decreased step length/decreased weight-shifting ability

## 2024-08-30 NOTE — OCCUPATIONAL THERAPY INITIAL EVALUATION ADULT - BALANCE TRAINING, PT EVAL
GOAL: Pt. will increase dynamic standing balance by 1 grade in order to increase independence with functional mobility and transfers for ADL completion

## 2024-08-30 NOTE — PHYSICAL THERAPY INITIAL EVALUATION ADULT - ADDITIONAL COMMENTS
Pt lives in a private house with dtr with no steps to negotiate. Pt was (I) with all ADLs and amb with RW.

## 2024-08-30 NOTE — DISCHARGE NOTE PROVIDER - HOSPITAL COURSE
History of Present Illness:  84 yo male with history of HLD, Hypothyroidism, T2Dm, prostate cancer since 2019, s/p RT, found to have diffuse skeletal mets. Mass seen on left acetabulum/ilium now for resection and Left hip arthroplasty with cage. Pt with painful walking using walker at home. Patient denies pain at rest but c/o BLE edema.     Past Medical History:  Diabetes mellitus  Hyperlipidemia  Hypothyroidism  Prostate cancer    Past Surgical History:  H/O prostate biopsy    Hospital Course:  After admission on 8/29/24 and receiving pre-operative parenteral prophylactic antibiotics, the patient underwent a removal of tumor from acetabulumm, left total hip arthroplasty with cage with Dr. Bonilla. Patient tolerated the procedure well and was transferred to the recovery room in stable condition, with a stable neuro/vascular exam of the operated extremity.    Patient was placed on Aspirin for DVT ppx, and was placed on Protonix for GI protection.    Patient was made weight bearing as tolerated left lower extremity with anterior hip precautions.    Patient evaluated by PT/OT and recommended for disposition for home with home PT.    Cardiology team was consulted and followed patient through hospital course. Recommendations: XXXXX.    Discharge and orthopedic care instructions were delineated in the discharge plan and reviewed with the patient. All medications were delineated in the medication reconciliation tool and key points were reviewed with the patient. They were deemed stable from an orthopedic & medical standpoint for discharge. History of Present Illness:  82 yo male with history of HLD, Hypothyroidism, T2Dm, prostate cancer since 2019, s/p RT, found to have diffuse skeletal mets. Mass seen on left acetabulum/ilium now for resection and Left hip arthroplasty with cage. Pt with painful walking using walker at home. Patient denies pain at rest but c/o BLE edema.     Past Medical History:  Diabetes mellitus  Hyperlipidemia  Hypothyroidism  Prostate cancer    Past Surgical History:  H/O prostate biopsy    Hospital Course:  After admission on 8/29/24 and receiving pre-operative parenteral prophylactic antibiotics, the patient underwent a removal of tumor from acetabulumm, left total hip arthroplasty with cage with Dr. Bonilla. Patient tolerated the procedure well and was transferred to the recovery room in stable condition, with a stable neuro/vascular exam of the operated extremity.    Patient was placed on Aspirin for DVT ppx, and was placed on Protonix for GI protection.    Patient was made weight bearing as tolerated left lower extremity with anterior hip precautions.    Patient evaluated by PT/OT and recommended for disposition for home with home PT.    Cardiology team was consulted and followed patient through hospital course. Recommendations: Continue current care    Discharge and orthopedic care instructions were delineated in the discharge plan and reviewed with the patient. All medications were delineated in the medication reconciliation tool and key points were reviewed with the patient. They were deemed stable from an orthopedic & medical standpoint for discharge.  8/31:  Patient stable and cleared for d/c -> home    Follow up Dr Bonilla in office                          9.2    3.85  )-----------( 92       ( 31 Aug 2024 06:31 )             26.7    History of Present Illness:  84 yo male with history of HLD, Hypothyroidism, T2Dm, prostate cancer since 2019, s/p RT, found to have diffuse skeletal mets. Mass seen on left acetabulum/ilium now for resection and Left hip arthroplasty with cage. Pt with painful walking using walker at home. Patient denies pain at rest but c/o BLE edema.     Past Medical History:  Diabetes mellitus  Hyperlipidemia  Hypothyroidism  Prostate cancer    Past Surgical History:  H/O prostate biopsy    Hospital Course:  After admission on 8/29/24 and receiving pre-operative parenteral prophylactic antibiotics, the patient underwent a removal of tumor from acetabulumm, left total hip arthroplasty with cage with Dr. Bonilla. Patient tolerated the procedure well and was transferred to the recovery room in stable condition, with a stable neuro/vascular exam of the operated extremity.    Patient was placed on Aspirin for DVT ppx, and was placed on Protonix for GI protection.    Patient was made weight bearing as tolerated left lower extremity with anterior hip precautions.    Patient evaluated by PT/OT and recommended for disposition for home with home PT.    Cardiology team was consulted and followed patient through hospital course. Recommendations: Continue current care    Discharge and orthopedic care instructions were delineated in the discharge plan and reviewed with the patient. All medications were delineated in the medication reconciliation tool and key points were reviewed with the patient. They were deemed stable from an orthopedic & medical standpoint for discharge.  8/31:  Patient cleared for home by PT.. Observed for 24hrs for post-op hyponatremia Na 127  9/1:  Na stabillized - Patient cleared for d/c -> home    Follow up Dr Bonilla in office                          9.2    3.85  )-----------( 92       ( 31 Aug 2024 06:31 )             26.7    History of Present Illness:  82 yo male with history of HLD, Hypothyroidism, T2Dm, prostate cancer since 2019, s/p RT, found to have diffuse skeletal mets. Mass seen on left acetabulum/ilium now for resection and Left hip arthroplasty with cage. Pt with painful walking using walker at home. Patient denies pain at rest but c/o BLE edema.     Past Medical History:  Diabetes mellitus  Hyperlipidemia  Hypothyroidism  Prostate cancer    Past Surgical History:  H/O prostate biopsy    Hospital Course:  After admission on 8/29/24 and receiving pre-operative parenteral prophylactic antibiotics, the patient underwent a removal of tumor from acetabulumm, left total hip arthroplasty with cage with Dr. Bonilla. Patient tolerated the procedure well and was transferred to the recovery room in stable condition, with a stable neuro/vascular exam of the operated extremity.    Patient was placed on Aspirin for DVT ppx, and was placed on Protonix for GI protection.    Patient was made weight bearing as tolerated left lower extremity with anterior hip precautions.    Patient evaluated by PT/OT and recommended for disposition for home with home PT.    Cardiology team was consulted and followed patient through hospital course. Recommendations: Continue current care    Discharge and orthopedic care instructions were delineated in the discharge plan and reviewed with the patient. All medications were delineated in the medication reconciliation tool and key points were reviewed with the patient. They were deemed stable from an orthopedic & medical standpoint for discharge.  8/31:  Patient cleared for home by PT.. Observed for 24hrs for post-op hyponatremia Na 127  9/1:  Na stabilized - Patient cleared for d/c -> home    Follow up Dr Bonilla in office                          9.2    3.85  )-----------( 92       ( 31 Aug 2024 06:31 )             26.7

## 2024-08-30 NOTE — OCCUPATIONAL THERAPY INITIAL EVALUATION ADULT - ADDITIONAL COMMENTS
Pt lives in a private house with dtr with no steps to negotiate. Pt was ambulating with RW and required stand by assistance/supervision for ADLs

## 2024-08-30 NOTE — OCCUPATIONAL THERAPY INITIAL EVALUATION ADULT - NSOTDISCHREC_GEN_A_CORE
with assistance and supervision with ADL/IADLs. pt. with all necessary equipment including shower chair+ RW/Home OT

## 2024-08-30 NOTE — OCCUPATIONAL THERAPY INITIAL EVALUATION ADULT - PERTINENT HX OF CURRENT PROBLEM, REHAB EVAL
82 yo male with history of HLD, Hypothyroidism, T2Dm, prostate cancer since 2019, s/p RT, found to have diffuse skeletal mets. Mass seen on left acetabulum/ilium now for resection and Left hip arthroplasty with cage. Now s/p L Total Hip Arthroplasty Anterior Approach. Pt is a 85 y/o male admitted to [facility] on 8/29/24 with history of HLD, Hypothyroidism, T2Dm, prostate cancer since 2019, s/p RT, found to have diffuse skeletal mets. Mass seen on left acetabulum/ilium now for resection and Left hip arthroplasty with cage. Pt with painful walking using walker at home. Patient denies pain at rest but c/o BLE edema.  XR pelvis: Status post ORIF of the left hip with acetabular hardware and a long femoral stem. No evidence of acute complication. Adjacent postsurgical changes within the soft tissues. The bones are diffusely osteopenic. The right hip is well situated within the acetabulum background of mild to moderate degenerative changes. Pt is now s/p L KRYSTINA w cage/L acetabular tumor resection

## 2024-08-30 NOTE — DISCHARGE NOTE PROVIDER - CARE PROVIDER_API CALL
Joe Bonilla  Musculoskeletal Oncology  [address] [facility] [facility] [address]  [place] [address]  Phone: [phone]  Fax: ()-  Follow Up Time:

## 2024-08-30 NOTE — CONSULT NOTE ADULT - SUBJECTIVE AND OBJECTIVE BOX
CHIEF COMPLAINT:Patient is a 84y old  Male who presents with a chief complaint of bone cancer  Pain scale 8/10  goal: walk better (08 Aug 2024 09:59)      HISTORY OF PRESENT ILLNESS:    84 male with history as below known to me from office for preop eval   is now s/p  L KRYSTINA w cage/L acetabular tumor resection  No chest pain, dyspnea, palpitation, or dizziness.     PAST MEDICAL & SURGICAL HISTORY:  Prostate cancer      Hyperlipidemia      Diabetes mellitus      Hypothyroidism      H/O prostate biopsy              MEDICATIONS:  aspirin enteric coated 81 milliGRAM(s) Oral two times a day        acetaminophen     Tablet .. 975 milliGRAM(s) Oral every 8 hours  acetaminophen   IVPB .. 1000 milliGRAM(s) IV Intermittent once  celecoxib 200 milliGRAM(s) Oral every 12 hours  ketorolac   Injectable 15 milliGRAM(s) IV Push every 6 hours  ondansetron Injectable 4 milliGRAM(s) IV Push every 6 hours PRN    magnesium hydroxide Suspension 30 milliLiter(s) Oral daily PRN  pantoprazole    Tablet 40 milliGRAM(s) Oral before breakfast  polyethylene glycol 3350 17 Gram(s) Oral at bedtime  senna 2 Tablet(s) Oral at bedtime    dextrose 50% Injectable 25 Gram(s) IV Push once  dextrose 50% Injectable 25 Gram(s) IV Push once  dextrose 50% Injectable 12.5 Gram(s) IV Push once  dextrose Oral Gel 15 Gram(s) Oral once PRN  fenofibrate Tablet 145 milliGRAM(s) Oral daily  glucagon  Injectable 1 milliGRAM(s) IntraMuscular once  insulin lispro (ADMELOG) corrective regimen sliding scale   SubCutaneous three times a day before meals  insulin lispro (ADMELOG) corrective regimen sliding scale   SubCutaneous at bedtime  levothyroxine 25 MICROGram(s) Oral daily    benzocaine/menthol Lozenge 1 Lozenge Oral every 6 hours PRN  bicalutamide 50 milliGRAM(s) Oral daily  dextrose 5%. 1000 milliLiter(s) IV Continuous <Continuous>  dextrose 5%. 1000 milliLiter(s) IV Continuous <Continuous>  multivitamin 1 Tablet(s) Oral daily      FAMILY HISTORY:      Non-contributory    SOCIAL HISTORY:    No tobacco, drugs or etoh    Allergies    No Known Allergies    Intolerances    	    REVIEW OF SYSTEMS:  as above  The rest of the 14 points ROS reviewed and except above they are unremarkable.        PHYSICAL EXAM:  T(C): 37.2 (08-30-24 @ 04:50), Max: 37.2 (08-30-24 @ 04:50)  HR: 78 (08-30-24 @ 04:50) (77 - 88)  BP: 101/61 (08-30-24 @ 04:50) (101/61 - 162/74)  RR: 18 (08-30-24 @ 04:50) (14 - 20)  SpO2: 97% (08-30-24 @ 04:50) (94% - 100%)  Wt(kg): --  I&O's Summary    29 Aug 2024 07:01  -  30 Aug 2024 07:00  --------------------------------------------------------  IN: 860 mL / OUT: 2025 mL / NET: -1165 mL      JVP: Normal  Neck: supple  Lung: clear   CV: S1 S2 , Murmur:  Abd: soft  Ext: No edema  neuro: Awake / alert  Psych: flat affect  Skin: normal      LABS/DATA:    TELEMETRY: 	    ECG:  	   	  CARDIAC MARKERS:                                      9.8    3.93  )-----------( 111      ( 30 Aug 2024 06:49 )             27.8     08-30    135  |  100  |  23  ----------------------------<  138<H>  4.1   |  20<L>  |  1.13    Ca    8.7      30 Aug 2024 06:50  Phos  3.3     08-29  Mg     2.0     08-29    TPro  5.4<L>  /  Alb  3.4  /  TBili  0.4  /  DBili  <0.1  /  AST  29  /  ALT  13  /  AlkPhos  84  08-29    proBNP:   Lipid Profile:   HgA1c:   TSH:

## 2024-08-30 NOTE — OCCUPATIONAL THERAPY INITIAL EVALUATION ADULT - DIAGNOSIS, OT EVAL
Pt. presents with decreased strength, balance, range of motion and weight shifting impacting functional peformance of ADLs

## 2024-08-30 NOTE — DISCHARGE NOTE PROVIDER - NSDCFUADDAPPT_GEN_ALL_CORE_FT
Follow up with your primary care physician regarding your hospitalization and changes to medications within one month of your discharge. Follow up with your primary care physician regarding your hospitalization and changes to medications within one month of your discharge.  Please call for an appointment  Follow up with your primary care physician regarding your hospitalization and changes to medications in 2-3 weeks  Please call for an appointment

## 2024-08-30 NOTE — DISCHARGE NOTE PROVIDER - NSDCFUSCHEDAPPT_GEN_ALL_CORE_FT
[facility] Physician [facility]  [facility]  Scheduled Appointment: 09/11/2024    Joe Bonilla  [facility] Physician [facility]  ORTHOSURG [address] [facility]  Scheduled Appointment: 09/13/2024    Nereyda Gilman  [facility]  [facility] [address]  Scheduled Appointment: 09/16/2024    Stephen Valentin  [facility] [address]  Scheduled Appointment: 10/10/2024

## 2024-08-31 LAB
ANION GAP SERPL CALC-SCNC: 9 MMOL/L — SIGNIFICANT CHANGE UP (ref 5–17)
BUN SERPL-MCNC: 17 MG/DL — SIGNIFICANT CHANGE UP (ref 7–23)
CALCIUM SERPL-MCNC: 8.2 MG/DL — LOW (ref 8.4–10.5)
CHLORIDE SERPL-SCNC: 97 MMOL/L — SIGNIFICANT CHANGE UP (ref 96–108)
CO2 SERPL-SCNC: 21 MMOL/L — LOW (ref 22–31)
CREAT SERPL-MCNC: 0.99 MG/DL — SIGNIFICANT CHANGE UP (ref 0.5–1.3)
EGFR: 75 ML/MIN/1.73M2 — SIGNIFICANT CHANGE UP
GLUCOSE BLDC GLUCOMTR-MCNC: 147 MG/DL — HIGH (ref 70–99)
GLUCOSE BLDC GLUCOMTR-MCNC: 156 MG/DL — HIGH (ref 70–99)
GLUCOSE BLDC GLUCOMTR-MCNC: 175 MG/DL — HIGH (ref 70–99)
GLUCOSE BLDC GLUCOMTR-MCNC: 216 MG/DL — HIGH (ref 70–99)
GLUCOSE SERPL-MCNC: 144 MG/DL — HIGH (ref 70–99)
HCT VFR BLD CALC: 26.7 % — LOW (ref 39–50)
HGB BLD-MCNC: 9.2 G/DL — LOW (ref 13–17)
MCHC RBC-ENTMCNC: 28.8 PG — SIGNIFICANT CHANGE UP (ref 27–34)
MCHC RBC-ENTMCNC: 34.5 GM/DL — SIGNIFICANT CHANGE UP (ref 32–36)
MCV RBC AUTO: 83.4 FL — SIGNIFICANT CHANGE UP (ref 80–100)
NRBC # BLD: 0 /100 WBCS — SIGNIFICANT CHANGE UP (ref 0–0)
PLATELET # BLD AUTO: 92 K/UL — LOW (ref 150–400)
POTASSIUM SERPL-MCNC: 4 MMOL/L — SIGNIFICANT CHANGE UP (ref 3.5–5.3)
POTASSIUM SERPL-SCNC: 4 MMOL/L — SIGNIFICANT CHANGE UP (ref 3.5–5.3)
RBC # BLD: 3.2 M/UL — LOW (ref 4.2–5.8)
RBC # FLD: 13.2 % — SIGNIFICANT CHANGE UP (ref 10.3–14.5)
SODIUM SERPL-SCNC: 127 MMOL/L — LOW (ref 135–145)
WBC # BLD: 3.85 K/UL — SIGNIFICANT CHANGE UP (ref 3.8–10.5)
WBC # FLD AUTO: 3.85 K/UL — SIGNIFICANT CHANGE UP (ref 3.8–10.5)

## 2024-08-31 RX ORDER — ACETAMINOPHEN 325 MG/1
3 TABLET ORAL
Qty: 0 | Refills: 0 | DISCHARGE
Start: 2024-08-31

## 2024-08-31 RX ORDER — PANTOPRAZOLE SODIUM 40 MG
1 TABLET, DELAYED RELEASE (ENTERIC COATED) ORAL
Qty: 10 | Refills: 0
Start: 2024-08-31 | End: 2024-09-09

## 2024-08-31 RX ORDER — ASPIRIN 81 MG
1 TABLET, DELAYED RELEASE (ENTERIC COATED) ORAL
Qty: 84 | Refills: 0
Start: 2024-08-31 | End: 2024-10-11

## 2024-08-31 RX ORDER — TRAMADOL HYDROCHLORIDE 200 MG/1
1 TABLET, EXTENDED RELEASE ORAL
Qty: 28 | Refills: 0
Start: 2024-08-31 | End: 2024-09-06

## 2024-08-31 RX ORDER — POLYETHYLENE GLYCOL 3350 17 G/17G
17 POWDER, FOR SOLUTION ORAL
Qty: 0 | Refills: 0 | DISCHARGE
Start: 2024-08-31

## 2024-08-31 RX ADMIN — FENOFIBRATE 145 MILLIGRAM(S): 145 TABLET, FILM COATED ORAL at 12:29

## 2024-08-31 RX ADMIN — Medication 40 MILLIGRAM(S): at 05:30

## 2024-08-31 RX ADMIN — ACETAMINOPHEN 975 MILLIGRAM(S): 325 TABLET ORAL at 06:15

## 2024-08-31 RX ADMIN — BICALUTAMIDE 50 MILLIGRAM(S): 50 TABLET, FILM COATED ORAL at 12:28

## 2024-08-31 RX ADMIN — ACETAMINOPHEN 975 MILLIGRAM(S): 325 TABLET ORAL at 05:30

## 2024-08-31 RX ADMIN — Medication 81 MILLIGRAM(S): at 18:22

## 2024-08-31 RX ADMIN — Medication 1: at 18:27

## 2024-08-31 RX ADMIN — Medication 1 TABLET(S): at 12:29

## 2024-08-31 RX ADMIN — CELECOXIB 200 MILLIGRAM(S): 400 CAPSULE ORAL at 05:30

## 2024-08-31 RX ADMIN — CELECOXIB 200 MILLIGRAM(S): 400 CAPSULE ORAL at 06:15

## 2024-08-31 RX ADMIN — ACETAMINOPHEN 975 MILLIGRAM(S): 325 TABLET ORAL at 15:23

## 2024-08-31 RX ADMIN — ACETAMINOPHEN 1000 MILLIGRAM(S): 325 TABLET ORAL at 00:25

## 2024-08-31 RX ADMIN — CELECOXIB 200 MILLIGRAM(S): 400 CAPSULE ORAL at 18:23

## 2024-08-31 RX ADMIN — Medication 2: at 12:43

## 2024-08-31 RX ADMIN — Medication 2 GRAM(S): at 12:28

## 2024-08-31 RX ADMIN — Medication 25 MICROGRAM(S): at 05:30

## 2024-08-31 RX ADMIN — ACETAMINOPHEN 975 MILLIGRAM(S): 325 TABLET ORAL at 14:30

## 2024-08-31 RX ADMIN — CELECOXIB 200 MILLIGRAM(S): 400 CAPSULE ORAL at 19:30

## 2024-08-31 RX ADMIN — Medication 81 MILLIGRAM(S): at 05:30

## 2024-08-31 NOTE — PROVIDER CONTACT NOTE (OTHER) - ACTION/TREATMENT ORDERED:
PRN zofran given, patient care ongoing
Pt. evaluated by Ortho PA/Dalton Engle, Elevate Penis/scrotum on pillow/Ice as needed/Monitor/PP/RN

## 2024-08-31 NOTE — PROGRESS NOTE ADULT - ASSESSMENT
s/p KRYSTINA  pain control   fu with ortho    DM II  Monitor finger stick. Insulin coverage. Diabetic education and Diabetic diet. Consider nutrition consultation.    DVT proph  on asa as per ortho protocol     encounter post surgery   HD stable  monitor H/H      Advanced care planning was discussed with patient and family.  Risks, benefits and alternatives of the cardiac treatments and medical therapy including procedures were discussed in detail and all questions were answered. Importance of compliance with medical therapy and lifestyle modification to improve cardiovascular health were addressed. Appropriate forms and patient educational materials were reviewed. 30 minutes face to face spent.   s/p KRYSTINA  pain control   fu with ortho    DM II  Monitor finger stick. Insulin coverage. Diabetic education and Diabetic diet. Consider nutrition consultation.    DVT proph  on asa as per ortho protocol     Hyponatremia  renal eval is recommended      Advanced care planning was discussed with patient and family.  Risks, benefits and alternatives of the cardiac treatments and medical therapy including procedures were discussed in detail and all questions were answered. Importance of compliance with medical therapy and lifestyle modification to improve cardiovascular health were addressed. Appropriate forms and patient educational materials were reviewed. 30 minutes face to face spent.

## 2024-08-31 NOTE — PROVIDER CONTACT NOTE (OTHER) - ASSESSMENT
Pt A&Ox4, after walking to bathroom complained of dizziness and nausea. No complaints of SOB or chest pain
Pt. resting comfortably in bed, facial grimace, scrotum/penis swollen/c/o pain

## 2024-08-31 NOTE — PROVIDER CONTACT NOTE (OTHER) - BACKGROUND
See H & P
See H&P
Bed in lowest position, wheels locked, appropriate side rails in place/Call bell, personal items and telephone in reach/Instruct patient to call for assistance before getting out of bed or chair/Non-slip footwear when patient is out of bed/[place] to call system/Physically safe environment - no spills, clutter or unnecessary equipment/Purposeful Proactive Rounding/Room/bathroom lighting operational, light cord in reach

## 2024-08-31 NOTE — PROGRESS NOTE ADULT - ASSESSMENT
Assessment: s/p L KRYSTINA w/ Acetabular Tumor Resex    Plan:   PT:  WBAT - anterior precautions  ASA 81 BID  Follow up pathology  Dispo: eventual home once cleared by PT

## 2024-09-01 VITALS
TEMPERATURE: 98 F | RESPIRATION RATE: 18 BRPM | OXYGEN SATURATION: 95 % | DIASTOLIC BLOOD PRESSURE: 68 MMHG | HEART RATE: 87 BPM | SYSTOLIC BLOOD PRESSURE: 113 MMHG

## 2024-09-01 LAB
ANION GAP SERPL CALC-SCNC: 11 MMOL/L — SIGNIFICANT CHANGE UP (ref 5–17)
BLD GP AB SCN SERPL QL: NEGATIVE — SIGNIFICANT CHANGE UP
BUN SERPL-MCNC: 13 MG/DL — SIGNIFICANT CHANGE UP (ref 7–23)
CALCIUM SERPL-MCNC: 8.5 MG/DL — SIGNIFICANT CHANGE UP (ref 8.4–10.5)
CHLORIDE SERPL-SCNC: 99 MMOL/L — SIGNIFICANT CHANGE UP (ref 96–108)
CO2 SERPL-SCNC: 23 MMOL/L — SIGNIFICANT CHANGE UP (ref 22–31)
CREAT SERPL-MCNC: 0.93 MG/DL — SIGNIFICANT CHANGE UP (ref 0.5–1.3)
EGFR: 81 ML/MIN/1.73M2 — SIGNIFICANT CHANGE UP
GLUCOSE BLDC GLUCOMTR-MCNC: 162 MG/DL — HIGH (ref 70–99)
GLUCOSE SERPL-MCNC: 123 MG/DL — HIGH (ref 70–99)
HCT VFR BLD CALC: 28.2 % — LOW (ref 39–50)
HGB BLD-MCNC: 9.8 G/DL — LOW (ref 13–17)
MCHC RBC-ENTMCNC: 29.4 PG — SIGNIFICANT CHANGE UP (ref 27–34)
MCHC RBC-ENTMCNC: 34.8 GM/DL — SIGNIFICANT CHANGE UP (ref 32–36)
MCV RBC AUTO: 84.7 FL — SIGNIFICANT CHANGE UP (ref 80–100)
NRBC # BLD: 0 /100 WBCS — SIGNIFICANT CHANGE UP (ref 0–0)
PLATELET # BLD AUTO: 106 K/UL — LOW (ref 150–400)
POTASSIUM SERPL-MCNC: 3.6 MMOL/L — SIGNIFICANT CHANGE UP (ref 3.5–5.3)
POTASSIUM SERPL-SCNC: 3.6 MMOL/L — SIGNIFICANT CHANGE UP (ref 3.5–5.3)
RBC # BLD: 3.33 M/UL — LOW (ref 4.2–5.8)
RBC # FLD: 13.2 % — SIGNIFICANT CHANGE UP (ref 10.3–14.5)
RH IG SCN BLD-IMP: POSITIVE — SIGNIFICANT CHANGE UP
SODIUM SERPL-SCNC: 133 MMOL/L — LOW (ref 135–145)
WBC # BLD: 4.09 K/UL — SIGNIFICANT CHANGE UP (ref 3.8–10.5)
WBC # FLD AUTO: 4.09 K/UL — SIGNIFICANT CHANGE UP (ref 3.8–10.5)

## 2024-09-01 PROCEDURE — 82803 BLOOD GASES ANY COMBINATION: CPT

## 2024-09-01 PROCEDURE — 97110 THERAPEUTIC EXERCISES: CPT

## 2024-09-01 PROCEDURE — 83605 ASSAY OF LACTIC ACID: CPT

## 2024-09-01 PROCEDURE — 86850 RBC ANTIBODY SCREEN: CPT

## 2024-09-01 PROCEDURE — P9059: CPT

## 2024-09-01 PROCEDURE — 88311 DECALCIFY TISSUE: CPT

## 2024-09-01 PROCEDURE — C1713: CPT

## 2024-09-01 PROCEDURE — 88305 TISSUE EXAM BY PATHOLOGIST: CPT

## 2024-09-01 PROCEDURE — 82947 ASSAY GLUCOSE BLOOD QUANT: CPT

## 2024-09-01 PROCEDURE — 83735 ASSAY OF MAGNESIUM: CPT

## 2024-09-01 PROCEDURE — 88342 IMHCHEM/IMCYTCHM 1ST ANTB: CPT

## 2024-09-01 PROCEDURE — P9037: CPT

## 2024-09-01 PROCEDURE — 97116 GAIT TRAINING THERAPY: CPT

## 2024-09-01 PROCEDURE — C1776: CPT

## 2024-09-01 PROCEDURE — 86901 BLOOD TYPING SEROLOGIC RH(D): CPT

## 2024-09-01 PROCEDURE — 85018 HEMOGLOBIN: CPT

## 2024-09-01 PROCEDURE — 97530 THERAPEUTIC ACTIVITIES: CPT

## 2024-09-01 PROCEDURE — 82330 ASSAY OF CALCIUM: CPT

## 2024-09-01 PROCEDURE — C1751: CPT

## 2024-09-01 PROCEDURE — 82435 ASSAY OF BLOOD CHLORIDE: CPT

## 2024-09-01 PROCEDURE — 84100 ASSAY OF PHOSPHORUS: CPT

## 2024-09-01 PROCEDURE — 84132 ASSAY OF SERUM POTASSIUM: CPT

## 2024-09-01 PROCEDURE — C9399: CPT

## 2024-09-01 PROCEDURE — 97161 PT EVAL LOW COMPLEX 20 MIN: CPT

## 2024-09-01 PROCEDURE — 84295 ASSAY OF SERUM SODIUM: CPT

## 2024-09-01 PROCEDURE — 85027 COMPLETE CBC AUTOMATED: CPT

## 2024-09-01 PROCEDURE — 80048 BASIC METABOLIC PNL TOTAL CA: CPT

## 2024-09-01 PROCEDURE — 97535 SELF CARE MNGMENT TRAINING: CPT

## 2024-09-01 PROCEDURE — 86900 BLOOD TYPING SEROLOGIC ABO: CPT

## 2024-09-01 PROCEDURE — C1769: CPT

## 2024-09-01 PROCEDURE — 82962 GLUCOSE BLOOD TEST: CPT

## 2024-09-01 PROCEDURE — 86923 COMPATIBILITY TEST ELECTRIC: CPT

## 2024-09-01 PROCEDURE — 88307 TISSUE EXAM BY PATHOLOGIST: CPT

## 2024-09-01 PROCEDURE — P9100: CPT

## 2024-09-01 PROCEDURE — 73551 X-RAY EXAM OF FEMUR 1: CPT

## 2024-09-01 PROCEDURE — 88341 IMHCHEM/IMCYTCHM EA ADD ANTB: CPT

## 2024-09-01 PROCEDURE — 72170 X-RAY EXAM OF PELVIS: CPT

## 2024-09-01 PROCEDURE — 85014 HEMATOCRIT: CPT

## 2024-09-01 PROCEDURE — 85396 CLOTTING ASSAY WHOLE BLOOD: CPT

## 2024-09-01 PROCEDURE — 80076 HEPATIC FUNCTION PANEL: CPT

## 2024-09-01 PROCEDURE — 97165 OT EVAL LOW COMPLEX 30 MIN: CPT

## 2024-09-01 PROCEDURE — P9016: CPT

## 2024-09-01 RX ORDER — APIXABAN 5 MG/1
1 TABLET, FILM COATED ORAL
Qty: 60 | Refills: 0
Start: 2024-09-01 | End: 2024-09-30

## 2024-09-01 RX ORDER — APIXABAN 5 MG/1
2.5 TABLET, FILM COATED ORAL EVERY 12 HOURS
Refills: 0 | Status: DISCONTINUED | OUTPATIENT
Start: 2024-09-01 | End: 2024-09-01

## 2024-09-01 RX ADMIN — CELECOXIB 200 MILLIGRAM(S): 400 CAPSULE ORAL at 05:58

## 2024-09-01 RX ADMIN — Medication 25 MICROGRAM(S): at 05:58

## 2024-09-01 RX ADMIN — Medication 81 MILLIGRAM(S): at 05:59

## 2024-09-01 RX ADMIN — CELECOXIB 200 MILLIGRAM(S): 400 CAPSULE ORAL at 06:52

## 2024-09-01 RX ADMIN — ACETAMINOPHEN 975 MILLIGRAM(S): 325 TABLET ORAL at 05:59

## 2024-09-01 RX ADMIN — Medication 40 MILLIGRAM(S): at 05:59

## 2024-09-01 RX ADMIN — ACETAMINOPHEN 975 MILLIGRAM(S): 325 TABLET ORAL at 06:50

## 2024-09-01 RX ADMIN — Medication 1: at 09:10

## 2024-09-01 RX ADMIN — ACETAMINOPHEN 975 MILLIGRAM(S): 325 TABLET ORAL at 14:30

## 2024-09-01 RX ADMIN — Medication 1 TABLET(S): at 13:29

## 2024-09-01 RX ADMIN — ACETAMINOPHEN 975 MILLIGRAM(S): 325 TABLET ORAL at 13:34

## 2024-09-01 RX ADMIN — BICALUTAMIDE 50 MILLIGRAM(S): 50 TABLET, FILM COATED ORAL at 13:29

## 2024-09-01 RX ADMIN — Medication 2 GRAM(S): at 13:34

## 2024-09-01 RX ADMIN — FENOFIBRATE 145 MILLIGRAM(S): 145 TABLET, FILM COATED ORAL at 13:25

## 2024-09-01 NOTE — PROGRESS NOTE ADULT - SUBJECTIVE AND OBJECTIVE BOX
Subjective: Patient seen and examined. No new events except as noted.     SUBJECTIVE/ROS:  MEDICATIONS:  MEDICATIONS  (STANDING):  acetaminophen     Tablet .. 975 milliGRAM(s) Oral every 8 hours  aspirin enteric coated 81 milliGRAM(s) Oral two times a day  bicalutamide 50 milliGRAM(s) Oral daily  celecoxib 200 milliGRAM(s) Oral every 12 hours  dextrose 5%. 1000 milliLiter(s) (50 mL/Hr) IV Continuous <Continuous>  dextrose 5%. 1000 milliLiter(s) (100 mL/Hr) IV Continuous <Continuous>  dextrose 50% Injectable 12.5 Gram(s) IV Push once  dextrose 50% Injectable 25 Gram(s) IV Push once  dextrose 50% Injectable 25 Gram(s) IV Push once  fenofibrate Tablet 145 milliGRAM(s) Oral daily  glucagon  Injectable 1 milliGRAM(s) IntraMuscular once  insulin lispro (ADMELOG) corrective regimen sliding scale   SubCutaneous three times a day before meals  insulin lispro (ADMELOG) corrective regimen sliding scale   SubCutaneous at bedtime  levothyroxine 25 MICROGram(s) Oral daily  multivitamin 1 Tablet(s) Oral daily  omega-3-Acid Ethyl Esters 2 Gram(s) Oral daily  pantoprazole    Tablet 40 milliGRAM(s) Oral before breakfast  polyethylene glycol 3350 17 Gram(s) Oral at bedtime  senna 2 Tablet(s) Oral at bedtime  sodium chloride 0.9%. 1000 milliLiter(s) (40 mL/Hr) IV Continuous <Continuous>      PHYSICAL EXAM:  T(C): 37.1 (09-01-24 @ 05:04), Max: 37.6 (08-31-24 @ 16:11)  HR: 92 (09-01-24 @ 05:04) (83 - 92)  BP: 133/77 (09-01-24 @ 05:04) (133/77 - 165/77)  RR: 18 (09-01-24 @ 05:04) (18 - 18)  SpO2: 97% (09-01-24 @ 05:04) (96% - 100%)  Wt(kg): --  I&O's Summary    31 Aug 2024 07:01  -  01 Sep 2024 07:00  --------------------------------------------------------  IN: 1020 mL / OUT: 900 mL / NET: 120 mL            JVP: Normal  Neck: supple  Lung: clear   CV: S1 S2 , Murmur:  Abd: soft  neuro: Awake / alert  Psych: flat affect  Skin: normal``    LABS/DATA:    CARDIAC MARKERS:                                9.8    4.09  )-----------( 106      ( 01 Sep 2024 07:22 )             28.2     08-31    127<L>  |  97  |  17  ----------------------------<  144<H>  4.0   |  21<L>  |  0.99    Ca    8.2<L>      31 Aug 2024 06:31      proBNP:   Lipid Profile:   HgA1c:   TSH:     TELE:  EKG:        
    Subjective: Patient seen and examined. No new events except as noted.     SUBJECTIVE/ROS:  nad      MEDICATIONS:  MEDICATIONS  (STANDING):  acetaminophen     Tablet .. 975 milliGRAM(s) Oral every 8 hours  aspirin enteric coated 81 milliGRAM(s) Oral two times a day  bicalutamide 50 milliGRAM(s) Oral daily  celecoxib 200 milliGRAM(s) Oral every 12 hours  dextrose 5%. 1000 milliLiter(s) (50 mL/Hr) IV Continuous <Continuous>  dextrose 5%. 1000 milliLiter(s) (100 mL/Hr) IV Continuous <Continuous>  dextrose 50% Injectable 12.5 Gram(s) IV Push once  dextrose 50% Injectable 25 Gram(s) IV Push once  dextrose 50% Injectable 25 Gram(s) IV Push once  fenofibrate Tablet 145 milliGRAM(s) Oral daily  glucagon  Injectable 1 milliGRAM(s) IntraMuscular once  insulin lispro (ADMELOG) corrective regimen sliding scale   SubCutaneous at bedtime  insulin lispro (ADMELOG) corrective regimen sliding scale   SubCutaneous three times a day before meals  levothyroxine 25 MICROGram(s) Oral daily  multivitamin 1 Tablet(s) Oral daily  omega-3-Acid Ethyl Esters 2 Gram(s) Oral daily  pantoprazole    Tablet 40 milliGRAM(s) Oral before breakfast  polyethylene glycol 3350 17 Gram(s) Oral at bedtime  senna 2 Tablet(s) Oral at bedtime  sodium chloride 0.9%. 1000 milliLiter(s) (40 mL/Hr) IV Continuous <Continuous>      PHYSICAL EXAM:  T(C): 37.1 (08-31-24 @ 07:26), Max: 37.1 (08-31-24 @ 07:26)  HR: 79 (08-31-24 @ 07:26) (79 - 85)  BP: 138/80 (08-31-24 @ 07:26) (117/54 - 176/70)  RR: 18 (08-31-24 @ 07:26) (18 - 18)  SpO2: 99% (08-31-24 @ 07:26) (96% - 99%)  Wt(kg): --  I&O's Summary    30 Aug 2024 07:01  -  31 Aug 2024 07:00  --------------------------------------------------------  IN: 740 mL / OUT: 950 mL / NET: -210 mL            JVP: Normal  Neck: supple  Lung: clear   CV: S1 S2 , Murmur:  Abd: soft  Ext: No edema  neuro: Awake / alert  Psych: flat affect  Skin: normal``    LABS/DATA:    CARDIAC MARKERS:                                9.2    3.85  )-----------( 92       ( 31 Aug 2024 06:31 )             26.7     08-31    127<L>  |  97  |  17  ----------------------------<  144<H>  4.0   |  21<L>  |  0.99    Ca    8.2<L>      31 Aug 2024 06:31  Phos  3.3     08-29  Mg     2.0     08-29    TPro  5.4<L>  /  Alb  3.4  /  TBili  0.4  /  DBili  <0.1  /  AST  29  /  ALT  13  /  AlkPhos  84  08-29    proBNP:   Lipid Profile:   HgA1c:   TSH:     TELE:  EKG:        
Patient is a 84y old  Male who presents with a chief complaint of Complex L KRYSTINA, removal of tumor from acetabulum (30 Aug 2024 13:31)      POST OPERATIVE DAY #:  [2 ]   Patient comfortable  No complaints  Family member @ bedside    VS:  T(C): 37.1 (08-31-24 @ 07:26), Max: 37.1 (08-31-24 @ 07:26)  T(F): 98.7 (08-31-24 @ 07:26), Max: 98.7 (08-31-24 @ 07:26)  HR: 79 (08-31-24 @ 07:26) (79 - 85)  BP: 138/80 (08-31-24 @ 07:26) (117/54 - 176/70)  RR: 18 (08-31-24 @ 07:26) (18 - 18)  SpO2: 99% (08-31-24 @ 07:26) (96% - 99%)  Wt(kg): --      PHYSICAL EXAM:  NAD, Alert  EXT:   LLE  [ ] Aquacel Dressing C/D/I  Mild generalized swelling noted  No Calf Tenderness  (+) DF/PF;/ EHL/FHL  5/5  Sensation: No gross deficits noted  1+ pedal edema  Pulses [1+] DP    B/L, PAS     LABS:                        9.2<L>  3.85  )-----------( 92<L>    ( 31 Aug 2024 06:31 )             26.7<L>    08-31    127<L>  |  97  |  17  ----------------------------<  144<H>  4.0   |  21<L>  |  0.99                
Patient comfortable  Recently medicated with Zofran for c/o nausea.  Reports moderate incisional pain.       T(C): 37.2 (08-30-24 @ 04:50), Max: 37.2 (08-30-24 @ 04:50)  HR: 78 (08-30-24 @ 04:50) (77 - 88)  BP: 101/61 (08-30-24 @ 04:50) (101/61 - 162/74)  RR: 18 (08-30-24 @ 04:50) (14 - 20)  SpO2: 97% (08-30-24 @ 04:50) (94% - 100%)      PHYSICAL EXAM:  NAD, Alert and oriented X3, pleasant, follows commands   Left Hip: Aquacell Dressings C/D/I; compartments soft and compressible, dull sensation grossly intact to light touch; (+) EHL/FHL/DF/PF; (+) Distal Pulses; No Calf tenderness B/L, PAS     LABS:                        9.8    3.93  )-----------( 111      ( 30 Aug 2024 06:49 )             27.8     08-29    135  |  101  |  19  ----------------------------<  215<H>  4.3   |  18<L>  |  0.84    Ca    9.7      29 Aug 2024 15:18  Phos  3.3     08-29  Mg     2.0     08-29    TPro  5.4<L>  /  Alb  3.4  /  TBili  0.4  /  DBili  <0.1  /  AST  29  /  ALT  13  /  AlkPhos  84  08-29            
Patient is a 84y old  Male who presents with a chief complaint of Complex L KRYSTINA, removal of tumor from acetabulum (30 Aug 2024 13:31)      POST OPERATIVE DAY #:  [3 ]   Patient comfortable  Still c/o scrotal swelling - somewhat improved today  On Fluid Restriction for Na 127  Family members @ bedside    Patient cleared by PT -> home    ICU Vital Signs Last 24 Hrs  T(C): 37.1 (01 Sep 2024 05:04), Max: 37.6 (31 Aug 2024 16:11)  T(F): 98.7 (01 Sep 2024 05:04), Max: 99.7 (31 Aug 2024 16:11)  HR: 92 (01 Sep 2024 05:04) (83 - 92)  BP: 133/77 (01 Sep 2024 05:04) (133/77 - 165/77)  RR: 18 (01 Sep 2024 05:04) (18 - 18)  SpO2: 97% (01 Sep 2024 05:04) (96% - 100%)      PHYSICAL EXAM:  NAD, Alert  EXT:   LLE  [x ] Aquacel Dressing C/D/I  Generalized swelling noted thigh  (+) scrotal swelling (elevated with ice  No Calf Tenderness  (+) DF/PF;/ EHL/FHL  5/5  Sensation: No gross deficits noted  1+ pedal edema  Pulses [1+] DP    B/L, PAS     LABS:                        9.2<L>  3.85  )-----------( 92<L>    ( 31 Aug 2024 06:31 )             26.7<L>    08-31    127<L>  |  97  |  17  ----------------------------<  144<H>  4.0   |  21<L>  |  0.99

## 2024-09-01 NOTE — PROGRESS NOTE ADULT - ASSESSMENT
Assessment: s/p L KRYSTINA w/ Acetabular Tumor Resex    Plan:   PT:  [facility] - anterior precautions  Continue Fluid restriction    Follow up Na levels  Continue ice /elevate  ASA 81 BID  Follow up pathology  Dispo: eventual home once cleared by PT

## 2024-09-01 NOTE — PROGRESS NOTE ADULT - ASSESSMENT
s/p KRYSTINA  pain control   fu with ortho    DM II  Monitor finger stick. Insulin coverage. Diabetic education and Diabetic diet. Consider nutrition consultation.    DVT proph  on asa as per ortho protocol     Hyponatremia  fu labs today   renal eval is recommended

## 2024-09-01 NOTE — PROGRESS NOTE ADULT - PROBLEM SELECTOR PLAN 1
***See Above  Paul HARTMANN  Orthopedics  B: [id number]  S: [phone]
***See Above  Paul HARTMANN  Orthopedics  B: [id number]  S: [phone]

## 2024-09-01 NOTE — PROGRESS NOTE ADULT - PROBLEM SELECTOR PROBLEM 1
Pathological fracture of pelvis, initial encounter
Pathological fracture of pelvis, initial encounter

## 2024-09-06 LAB — SURGICAL PATHOLOGY STUDY: SIGNIFICANT CHANGE UP

## 2024-09-10 ENCOUNTER — APPOINTMENT (OUTPATIENT)
Dept: INTERNAL MEDICINE | Facility: CLINIC | Age: 84
End: 2024-09-10

## 2024-09-12 PROBLEM — Z96.642 STATUS POST TOTAL REPLACEMENT OF LEFT HIP: Status: ACTIVE | Noted: 2024-09-12

## 2024-09-13 ENCOUNTER — APPOINTMENT (OUTPATIENT)
Dept: ULTRASOUND IMAGING | Facility: IMAGING CENTER | Age: 84
End: 2024-09-13
Payer: MEDICAID

## 2024-09-13 ENCOUNTER — APPOINTMENT (OUTPATIENT)
Dept: ORTHOPEDIC SURGERY | Facility: CLINIC | Age: 84
End: 2024-09-13
Payer: MEDICAID

## 2024-09-13 ENCOUNTER — OUTPATIENT (OUTPATIENT)
Dept: OUTPATIENT SERVICES | Facility: HOSPITAL | Age: 84
LOS: 1 days | End: 2024-09-13
Payer: MEDICAID

## 2024-09-13 VITALS — TEMPERATURE: 98.1 F | HEART RATE: 99 BPM | DIASTOLIC BLOOD PRESSURE: 57 MMHG | SYSTOLIC BLOOD PRESSURE: 91 MMHG

## 2024-09-13 DIAGNOSIS — M84.454A PATHOLOGICAL FRACTURE, PELVIS, INITIAL ENCOUNTER FOR FRACTURE: ICD-10-CM

## 2024-09-13 DIAGNOSIS — Z98.890 OTHER SPECIFIED POSTPROCEDURAL STATES: Chronic | ICD-10-CM

## 2024-09-13 DIAGNOSIS — M79.89 OTHER SPECIFIED SOFT TISSUE DISORDERS: ICD-10-CM

## 2024-09-13 DIAGNOSIS — Z96.642 PRESENCE OF LEFT ARTIFICIAL HIP JOINT: ICD-10-CM

## 2024-09-13 DIAGNOSIS — I70.209 UNSPECIFIED ATHEROSCLEROSIS OF NATIVE ARTERIES OF EXTREMITIES, UNSPECIFIED EXTREMITY: ICD-10-CM

## 2024-09-13 PROCEDURE — 99024 POSTOP FOLLOW-UP VISIT: CPT

## 2024-09-13 PROCEDURE — 73502 X-RAY EXAM HIP UNI 2-3 VIEWS: CPT | Mod: LT

## 2024-09-13 PROCEDURE — 93970 EXTREMITY STUDY: CPT

## 2024-09-13 PROCEDURE — 93970 EXTREMITY STUDY: CPT | Mod: 26

## 2024-09-16 ENCOUNTER — APPOINTMENT (OUTPATIENT)
Dept: INTERNAL MEDICINE | Facility: CLINIC | Age: 84
End: 2024-09-16

## 2024-09-20 ENCOUNTER — APPOINTMENT (OUTPATIENT)
Dept: HEMATOLOGY ONCOLOGY | Facility: CLINIC | Age: 84
End: 2024-09-20
Payer: MEDICAID

## 2024-09-20 ENCOUNTER — RESULT REVIEW (OUTPATIENT)
Age: 84
End: 2024-09-20

## 2024-09-20 ENCOUNTER — APPOINTMENT (OUTPATIENT)
Dept: INFUSION THERAPY | Facility: HOSPITAL | Age: 84
End: 2024-09-20

## 2024-09-20 VITALS
TEMPERATURE: 97 F | SYSTOLIC BLOOD PRESSURE: 102 MMHG | OXYGEN SATURATION: 99 % | HEART RATE: 91 BPM | BODY MASS INDEX: 23.09 KG/M2 | RESPIRATION RATE: 16 BRPM | DIASTOLIC BLOOD PRESSURE: 63 MMHG | WEIGHT: 126.25 LBS

## 2024-09-20 DIAGNOSIS — Z85.46 PERSONAL HISTORY OF MALIGNANT NEOPLASM OF PROSTATE: ICD-10-CM

## 2024-09-20 DIAGNOSIS — C79.51 MALIGNANT NEOPLASM OF PROSTATE: ICD-10-CM

## 2024-09-20 DIAGNOSIS — C61 MALIGNANT NEOPLASM OF PROSTATE: ICD-10-CM

## 2024-09-20 LAB
BASOPHILS # BLD AUTO: 0.03 K/UL — SIGNIFICANT CHANGE UP (ref 0–0.2)
BASOPHILS NFR BLD AUTO: 0.8 % — SIGNIFICANT CHANGE UP (ref 0–2)
EOSINOPHIL # BLD AUTO: 0.22 K/UL — SIGNIFICANT CHANGE UP (ref 0–0.5)
EOSINOPHIL NFR BLD AUTO: 5.8 % — SIGNIFICANT CHANGE UP (ref 0–6)
HCT VFR BLD CALC: 26.2 % — LOW (ref 39–50)
HGB BLD-MCNC: 8.8 G/DL — LOW (ref 13–17)
IMM GRANULOCYTES NFR BLD AUTO: 0.3 % — SIGNIFICANT CHANGE UP (ref 0–0.9)
LYMPHOCYTES # BLD AUTO: 0.76 K/UL — LOW (ref 1–3.3)
LYMPHOCYTES # BLD AUTO: 20 % — SIGNIFICANT CHANGE UP (ref 13–44)
MCHC RBC-ENTMCNC: 28.9 PG — SIGNIFICANT CHANGE UP (ref 27–34)
MCHC RBC-ENTMCNC: 33.6 G/DL — SIGNIFICANT CHANGE UP (ref 32–36)
MCV RBC AUTO: 86.2 FL — SIGNIFICANT CHANGE UP (ref 80–100)
MONOCYTES # BLD AUTO: 0.48 K/UL — SIGNIFICANT CHANGE UP (ref 0–0.9)
MONOCYTES NFR BLD AUTO: 12.6 % — SIGNIFICANT CHANGE UP (ref 2–14)
NEUTROPHILS # BLD AUTO: 2.3 K/UL — SIGNIFICANT CHANGE UP (ref 1.8–7.4)
NEUTROPHILS NFR BLD AUTO: 60.5 % — SIGNIFICANT CHANGE UP (ref 43–77)
NRBC # BLD: 0 /100 WBCS — SIGNIFICANT CHANGE UP (ref 0–0)
PLATELET # BLD AUTO: 228 K/UL — SIGNIFICANT CHANGE UP (ref 150–400)
RBC # BLD: 3.04 M/UL — LOW (ref 4.2–5.8)
RBC # FLD: 13.6 % — SIGNIFICANT CHANGE UP (ref 10.3–14.5)
WBC # BLD: 3.8 K/UL — SIGNIFICANT CHANGE UP (ref 3.8–10.5)
WBC # FLD AUTO: 3.8 K/UL — SIGNIFICANT CHANGE UP (ref 3.8–10.5)

## 2024-09-20 PROCEDURE — 99214 OFFICE O/P EST MOD 30 MIN: CPT

## 2024-09-20 PROCEDURE — G2211 COMPLEX E/M VISIT ADD ON: CPT | Mod: NC

## 2024-09-20 NOTE — REVIEW OF SYSTEMS
[Lower Ext Edema] : lower extremity edema [Joint Pain] : joint pain [FreeTextEntry9] : Hip and knee pain unchanged [de-identified] : Slow gait

## 2024-09-20 NOTE — ASSESSMENT
[FreeTextEntry1] : 83 years old male with diagnosed stage IIIb prostate cancer in Jan 2019, s/p combined 2 years ADT and radiation with Dr. Franco. PSA lin was less than 0.1 in August 2021. Lost his follow up, p/w acute left knee, hip and shoulder pain with restriction of ROM. 12/5/23 PSA 1009. 12/2023 CT c/a/p, bone scan and MRI spine showed multiple bone lesions but no soft tissue disease. Lytic lesion involving L acetabulum causing pain c/f pathologic fracture, following with Ortho and Rad Onc s/p 2000cGy to L hip in Dec 2023 - Jan 2024 with Dr. Franco. Pt is a candidate for triplet therapy given performance/clinical status(ADT, docetaxel and ARSI) for high volume but is offered doublet (ADT and abiraterone/prednisone) given no cardiac history and uncontrollable hypertension. Started on bicalutamide and received Eligard on 12/29/23. Started abiraterone and prednisone Jan 2024.  Plan: [1] [facility] with high volume tolerates treatment well with PSA response - Continue Eligard [id number] due today 9/20/24 - Continue Abiraterone and prednisone  [2] Osteopenia - DEXA scan 1/12/24: osteopenia, FRAX Score: Major osteoporotic 5.3%, Hip Fracture 1.7%, prolia not indicated. repeat in 1/2026 - Continue Ca + Vit D  [3]  L acetabulum osseous lesion causing pain c/f pathologic fracture. - S/p Rad Onc s/p 2000cGy to L hip in Dec 2023 - Jan 2024 with Dr. Franco. - S/p complex L KRYSTINA w/ cage (8/29/24) with Dr. [facility].  [4] Nocturia [frequent urination at night] - Continue on tamsulosin 0.4mg daily  RTC in 6 weeks

## 2024-09-20 NOTE — HISTORY OF PRESENT ILLNESS
[de-identified] :     Mr. Lucas Cordova is an 83 year old male  01/2019 diagnosed stage IIIB ([id number]) adenocarcinoma of the prostate, Carrol score of 3+4=7, pre-treatment PSA of PSA of 35.95 ng/mL 02/06/2019 CT abdomen and pelvis showed prostate enhancement with extraprostatic extension. Bone scan showed GI.  who is status post radiation therapy from 5/1/2020 to 6/10/2020, total dose of 7,000 cGy.  02/2019 to 01/2021 two-year ADT treatment 8/11/21 PSA <0.1 ng/mL.  He has lost his follow up, was seen by Dr. Franco and referred for further evaluation. He was ordered for PSMA PET CT. developed left sided shoulder, hip and knee pain about 8 days ago. Could not walk because of joint pain. Continues to note discomfort with urination and trouble with urinary stream. Nocturia 3 to 4 times. Denies burning, dysuria and gross hematuria. He has difficulty urination. Urine flow is near normal.  12/5/23 PSA 1009ng/ml  12/10/23 CT abdomen and pelvis showed no abnormalities in the chest except nonspecific 4mm right middle lobe nodule and abdomen/pelvis. Bone scan showed scattered foci of increased activity can be seen in the parietal bone, right scapula, ribs, spine, left humerus and in the pelvis notably the left acetabulum and left iliac bone. Focus localizing to the lesser trochanter of the left femur related to the lytic lesion with cortical thinning with no cortical break. MRI left hip and pelvis showed acute pathological fracture in left acetabulum. diffuse osteolytic lesions consistent with metastasis. MRI C-, T- and L spine showed diffuse osseous metastasis mild ventral canal epidural disease T8 and T12. He was seen by Dr. Bonilla, [facility] who recommended radiation and hormone therapy without consideration. Reports left sided shoulder, hip and knee pain about 3 weeks. Could not walk because of joint pain. Nocturia 3 to 4 times. Denies burning, dysuria and gross hematuria. He has difficulty urination.  1/29/24: Patient started abiraterone on 1/11, takes it in the morning on an empty stomach, followed by prednisone one hour later with food. He also started taking Ca + Vit D. Patient is sitting in a wheelchair. He feels nauseous and weak, has decreased appetite, declined to be weighed today. He is fatigued but does feel re-energized after resting. Has pain on L hip, some days good, some days bad, does not like to take medication, only takes tylenol about once a week. Reports itching, denies rash. Has neuropathy in L foot, this is not new. Reportedly, glucose is well controlled. Pt walks with walker, he was recommended physical therapy but was in pain so he declined.. Recently saw Ortho. Reports nocturia 4-5, wears diapers.  3/6/24 reports feeling better, starts more walking with a walker, also has fatigue, denies hot flash/sweating. Nocturia 7. His appetite is better.   Disease: prostate cancer    Pathology: prostate adenocarcinoma   TNM stage: T3b, N0, Mx AJCC Stage: IV       Interval History: 3/29/24: BP initially 86/51, repeated 92/56, is BP runs low, this is his baseline, he does not feel dizzy or lightheaded. He is sitting in a wheelchair, at home he ambulates with a walker. He has pain in his L knee and hip, he occasionally uses tylenol for this. His appetite is good and he eats throughout the day however he is not gaining weight. Patient has ongoing peripheral neuropathy in his legs, this is chronic, goes from his toes to his knee, L is a little worse than R. He notes swelling in his legs that comes and goes, he elevates his legs and it improves. He has ongoing nocturia x 7, sometimes has weak urine stream, denies leaking. He denies nausea and vomiting, has occasional constipation, feels it is better when he eats oatmeal. He denies hot flashes. Notes occ itching but does not have a rash.  6/26/24 Reports intermittent left knee and left hip pain, 7/10, mild fatigue, denies hot flash and sweating. He has normal urination at daytime. Nocturia 8.   [de-identified] : Clinically stable PSA 0.21  7/18/2024-AP pelvis and iliac and obturator oblique x-rays: Interval rim of ossification around the medial acetabular wall with acetabular protrusio. Interval superior migration of the femoral head into the iliac wing. +pelvic discontinuity.  7/10/24 - L hip CT: HIP: No fracture. Degenerative changes of the spine. Vascular calcifications. Prostate brachytherapy seeds. Fragmentation of the acetabular which may be pathologic fracture. Irregularity of the femoral head with lucent lesions which may be pathologic fractures. Marked distention of the hip joint capsule with debris and likely synovial thickening. Ovoid sclerotic lesion at the left lesser trochanter. Sclerotic lesion in the L5 vertebral body.   9/20/24 pt is doing well, ambulates with walker and NAD. Tolerating Abiraterone and Prednisone well. Since his last visit s/p complex L KRYSTINA w/ cage on 8/29/24.  Endorses nocturia 6x per night despite Tamsulosin. No c/o hot flashes but mild fatigue that is not inteferring with quality of life at this time. Appetite is good and maintaining weight. Denies diarrhea or constipation. Noticeable Left lower extremity swelling the site of surgery; negative for DVT per US Duplex on 9/13/24.

## 2024-09-20 NOTE — REVIEW OF SYSTEMS
[Lower Ext Edema] : lower extremity edema [Joint Pain] : joint pain [FreeTextEntry9] : Hip and knee pain unchanged [de-identified] : Slow gait

## 2024-09-20 NOTE — ASSESSMENT
[FreeTextEntry1] : 83 years old male with diagnosed stage IIIb prostate cancer in Jan 2019, s/p combined 2 years ADT and radiation with Dr. Franco. PSA lin was less than 0.1 in August 2021. Lost his follow up, p/w acute left knee, hip and shoulder pain with restriction of ROM. 12/5/23 PSA 1009. 12/2023 CT c/a/p, bone scan and MRI spine showed multiple bone lesions but no soft tissue disease. Lytic lesion involving L acetabulum causing pain c/f pathologic fracture, following with Ortho and Rad Onc s/p 2000cGy to L hip in Dec 2023 - Jan 2024 with Dr. Franco. Pt is a candidate for triplet therapy given performance/clinical status(ADT, docetaxel and ARSI) for high volume but is offered doublet (ADT and abiraterone/prednisone) given no cardiac history and uncontrollable hypertension. Started on bicalutamide and received Eligard on 12/29/23. Started abiraterone and prednisone Jan 2024.  Plan: [1] [facility] with high volume tolerates treatment well with PSA response - Continue Eligard [id number] due today 9/20/24 - Continue Abiraterone and prednisone  [2] Osteopenia - DEXA scan 1/12/24: osteopenia, FRAX Score: Major osteoporotic 5.3%, Hip Fracture 1.7%, prolia not indicated. repeat in 1/2026 - Continue Ca + Vit D  [3]  L acetabulum osseous lesion causing pain c/f pathologic fracture. - S/p Rad Onc s/p 2000cGy to L hip in Dec 2023 - Jan 2024 with Dr. Franco. - S/p complex L KRYSTINA w/ cage (8/29/24) with Dr. [facility].  [4] Nocturia [frequent urination at night] - Continue on tamsulosin 0.4mg daily  RTC in 6 weeks

## 2024-09-20 NOTE — HISTORY OF PRESENT ILLNESS
[de-identified] :     Mr. Lucas Cordova is an 83 year old male  01/2019 diagnosed stage IIIB ([id number]) adenocarcinoma of the prostate, Carrol score of 3+4=7, pre-treatment PSA of PSA of 35.95 ng/mL 02/06/2019 CT abdomen and pelvis showed prostate enhancement with extraprostatic extension. Bone scan showed GI.  who is status post radiation therapy from 5/1/2020 to 6/10/2020, total dose of 7,000 cGy.  02/2019 to 01/2021 two-year ADT treatment 8/11/21 PSA <0.1 ng/mL.  He has lost his follow up, was seen by Dr. Franco and referred for further evaluation. He was ordered for PSMA PET CT. developed left sided shoulder, hip and knee pain about 8 days ago. Could not walk because of joint pain. Continues to note discomfort with urination and trouble with urinary stream. Nocturia 3 to 4 times. Denies burning, dysuria and gross hematuria. He has difficulty urination. Urine flow is near normal.  12/5/23 PSA 1009ng/ml  12/10/23 CT abdomen and pelvis showed no abnormalities in the chest except nonspecific 4mm right middle lobe nodule and abdomen/pelvis. Bone scan showed scattered foci of increased activity can be seen in the parietal bone, right scapula, ribs, spine, left humerus and in the pelvis notably the left acetabulum and left iliac bone. Focus localizing to the lesser trochanter of the left femur related to the lytic lesion with cortical thinning with no cortical break. MRI left hip and pelvis showed acute pathological fracture in left acetabulum. diffuse osteolytic lesions consistent with metastasis. MRI C-, T- and L spine showed diffuse osseous metastasis mild ventral canal epidural disease T8 and T12. He was seen by Dr. Bonilla, [facility] who recommended radiation and hormone therapy without consideration. Reports left sided shoulder, hip and knee pain about 3 weeks. Could not walk because of joint pain. Nocturia 3 to 4 times. Denies burning, dysuria and gross hematuria. He has difficulty urination.  1/29/24: Patient started abiraterone on 1/11, takes it in the morning on an empty stomach, followed by prednisone one hour later with food. He also started taking Ca + Vit D. Patient is sitting in a wheelchair. He feels nauseous and weak, has decreased appetite, declined to be weighed today. He is fatigued but does feel re-energized after resting. Has pain on L hip, some days good, some days bad, does not like to take medication, only takes tylenol about once a week. Reports itching, denies rash. Has neuropathy in L foot, this is not new. Reportedly, glucose is well controlled. Pt walks with walker, he was recommended physical therapy but was in pain so he declined.. Recently saw Ortho. Reports nocturia 4-5, wears diapers.  3/6/24 reports feeling better, starts more walking with a walker, also has fatigue, denies hot flash/sweating. Nocturia 7. His appetite is better.   Disease: prostate cancer    Pathology: prostate adenocarcinoma   TNM stage: T3b, N0, Mx AJCC Stage: IV       Interval History: 3/29/24: BP initially 86/51, repeated 92/56, is BP runs low, this is his baseline, he does not feel dizzy or lightheaded. He is sitting in a wheelchair, at home he ambulates with a walker. He has pain in his L knee and hip, he occasionally uses tylenol for this. His appetite is good and he eats throughout the day however he is not gaining weight. Patient has ongoing peripheral neuropathy in his legs, this is chronic, goes from his toes to his knee, L is a little worse than R. He notes swelling in his legs that comes and goes, he elevates his legs and it improves. He has ongoing nocturia x 7, sometimes has weak urine stream, denies leaking. He denies nausea and vomiting, has occasional constipation, feels it is better when he eats oatmeal. He denies hot flashes. Notes occ itching but does not have a rash.  6/26/24 Reports intermittent left knee and left hip pain, 7/10, mild fatigue, denies hot flash and sweating. He has normal urination at daytime. Nocturia 8.   [de-identified] : Clinically stable PSA 0.21  7/18/2024-AP pelvis and iliac and obturator oblique x-rays: Interval rim of ossification around the medial acetabular wall with acetabular protrusio. Interval superior migration of the femoral head into the iliac wing. +pelvic discontinuity.  7/10/24 - L hip CT: HIP: No fracture. Degenerative changes of the spine. Vascular calcifications. Prostate brachytherapy seeds. Fragmentation of the acetabular which may be pathologic fracture. Irregularity of the femoral head with lucent lesions which may be pathologic fractures. Marked distention of the hip joint capsule with debris and likely synovial thickening. Ovoid sclerotic lesion at the left lesser trochanter. Sclerotic lesion in the L5 vertebral body.   9/20/24 pt is doing well, ambulates with walker and NAD. Tolerating Abiraterone and Prednisone well. Since his last visit s/p complex L KRYSTINA w/ cage on 8/29/24.  Endorses nocturia 6x per night despite Tamsulosin. No c/o hot flashes but mild fatigue that is not inteferring with quality of life at this time. Appetite is good and maintaining weight. Denies diarrhea or constipation. Noticeable Left lower extremity swelling the site of surgery; negative for DVT per US Duplex on 9/13/24.

## 2024-09-23 LAB
ALBUMIN SERPL ELPH-MCNC: 4 G/DL
ALP BLD-CCNC: 133 U/L
ALT SERPL-CCNC: <5 U/L
ANION GAP SERPL CALC-SCNC: 13 MMOL/L
AST SERPL-CCNC: 11 U/L
BILIRUB SERPL-MCNC: 0.4 MG/DL
BUN SERPL-MCNC: 20 MG/DL
CALCIUM SERPL-MCNC: 9.3 MG/DL
CHLORIDE SERPL-SCNC: 100 MMOL/L
CO2 SERPL-SCNC: 21 MMOL/L
CREAT SERPL-MCNC: 0.99 MG/DL
EGFR: 75 ML/MIN/1.73M2
GLUCOSE SERPL-MCNC: 128 MG/DL
POTASSIUM SERPL-SCNC: 4.3 MMOL/L
PROT SERPL-MCNC: 6 G/DL
PSA SERPL-MCNC: 0.16 NG/ML
SODIUM SERPL-SCNC: 134 MMOL/L
TESTOST SERPL-MCNC: <2.5 NG/DL

## 2024-10-10 ENCOUNTER — APPOINTMENT (OUTPATIENT)
Dept: INTERNAL MEDICINE | Facility: CLINIC | Age: 84
End: 2024-10-10
Payer: MEDICAID

## 2024-10-10 VITALS
DIASTOLIC BLOOD PRESSURE: 65 MMHG | SYSTOLIC BLOOD PRESSURE: 108 MMHG | BODY MASS INDEX: 23.55 KG/M2 | RESPIRATION RATE: 16 BRPM | WEIGHT: 128 LBS | OXYGEN SATURATION: 100 % | HEIGHT: 62 IN | TEMPERATURE: 97.1 F | HEART RATE: 95 BPM

## 2024-10-10 DIAGNOSIS — C61 MALIGNANT NEOPLASM OF PROSTATE: ICD-10-CM

## 2024-10-10 DIAGNOSIS — D64.9 ANEMIA, UNSPECIFIED: ICD-10-CM

## 2024-10-10 DIAGNOSIS — E03.9 HYPOTHYROIDISM, UNSPECIFIED: ICD-10-CM

## 2024-10-10 DIAGNOSIS — H10.9 UNSPECIFIED CONJUNCTIVITIS: ICD-10-CM

## 2024-10-10 DIAGNOSIS — Z76.89 PERSONS ENCOUNTERING HEALTH SERVICES IN OTHER SPECIFIED CIRCUMSTANCES: ICD-10-CM

## 2024-10-10 DIAGNOSIS — C79.51 MALIGNANT NEOPLASM OF PROSTATE: ICD-10-CM

## 2024-10-10 DIAGNOSIS — E11.9 TYPE 2 DIABETES MELLITUS W/OUT COMPLICATIONS: ICD-10-CM

## 2024-10-10 PROCEDURE — 99204 OFFICE O/P NEW MOD 45 MIN: CPT

## 2024-10-10 RX ORDER — CIPROFLOXACIN 3 MG/ML
0.3 SOLUTION OPHTHALMIC EVERY 4 HOURS
Qty: 1 | Refills: 1 | Status: ACTIVE | COMMUNITY
Start: 2024-10-10 | End: 1900-01-01

## 2024-10-10 RX ORDER — FERROUS SULFATE TAB EC 325 MG (65 MG FE EQUIVALENT) 325 (65 FE) MG
325 (65 FE) TABLET DELAYED RESPONSE ORAL
Qty: 90 | Refills: 1 | Status: ACTIVE | COMMUNITY
Start: 2024-10-10 | End: 1900-01-01

## 2024-10-11 ENCOUNTER — APPOINTMENT (OUTPATIENT)
Dept: VASCULAR SURGERY | Facility: CLINIC | Age: 84
End: 2024-10-11
Payer: MEDICAID

## 2024-10-11 VITALS — DIASTOLIC BLOOD PRESSURE: 65 MMHG | SYSTOLIC BLOOD PRESSURE: 105 MMHG | HEART RATE: 92 BPM

## 2024-10-11 VITALS
HEART RATE: 93 BPM | TEMPERATURE: 98.5 F | SYSTOLIC BLOOD PRESSURE: 113 MMHG | DIASTOLIC BLOOD PRESSURE: 71 MMHG | WEIGHT: 128 LBS | BODY MASS INDEX: 23.55 KG/M2 | HEIGHT: 62 IN

## 2024-10-11 PROCEDURE — 93922 UPR/L XTREMITY ART 2 LEVELS: CPT

## 2024-10-11 PROCEDURE — 99203 OFFICE O/P NEW LOW 30 MIN: CPT

## 2024-10-12 LAB
25(OH)D3 SERPL-MCNC: 30.5 NG/ML
ALBUMIN SERPL ELPH-MCNC: 4.4 G/DL
ALP BLD-CCNC: 132 U/L
ALT SERPL-CCNC: 5 U/L
ANION GAP SERPL CALC-SCNC: 14 MMOL/L
APPEARANCE: CLEAR
AST SERPL-CCNC: 12 U/L
BACTERIA: NEGATIVE /HPF
BASOPHILS # BLD AUTO: 0.03 K/UL
BASOPHILS NFR BLD AUTO: 0.5 %
BILIRUB SERPL-MCNC: 0.6 MG/DL
BILIRUBIN URINE: NEGATIVE
BLOOD URINE: NEGATIVE
BUN SERPL-MCNC: 14 MG/DL
CALCIUM SERPL-MCNC: 9.6 MG/DL
CAST: 0 /LPF
CHLORIDE SERPL-SCNC: 99 MMOL/L
CHOLEST SERPL-MCNC: 173 MG/DL
CO2 SERPL-SCNC: 24 MMOL/L
COLOR: YELLOW
CREAT SERPL-MCNC: 0.98 MG/DL
EGFR: 76 ML/MIN/1.73M2
EOSINOPHIL # BLD AUTO: 0.17 K/UL
EOSINOPHIL NFR BLD AUTO: 3 %
EPITHELIAL CELLS: 0 /HPF
ESTIMATED AVERAGE GLUCOSE: 123 MG/DL
FERRITIN SERPL-MCNC: 535 NG/ML
GLUCOSE QUALITATIVE U: NEGATIVE MG/DL
GLUCOSE SERPL-MCNC: 108 MG/DL
HBA1C MFR BLD HPLC: 5.9 %
HCT VFR BLD CALC: 31 %
HDLC SERPL-MCNC: 43 MG/DL
HGB BLD-MCNC: 9.8 G/DL
IMM GRANULOCYTES NFR BLD AUTO: 0.4 %
IRON SATN MFR SERPL: 27 %
IRON SERPL-MCNC: 57 UG/DL
KETONES URINE: NEGATIVE MG/DL
LDLC SERPL CALC-MCNC: 100 MG/DL
LEUKOCYTE ESTERASE URINE: NEGATIVE
LYMPHOCYTES # BLD AUTO: 1.28 K/UL
LYMPHOCYTES NFR BLD AUTO: 22.9 %
MAN DIFF?: NORMAL
MCHC RBC-ENTMCNC: 27.8 PG
MCHC RBC-ENTMCNC: 31.6 GM/DL
MCV RBC AUTO: 87.8 FL
MICROSCOPIC-UA: NORMAL
MONOCYTES # BLD AUTO: 0.44 K/UL
MONOCYTES NFR BLD AUTO: 7.9 %
NEUTROPHILS # BLD AUTO: 3.66 K/UL
NEUTROPHILS NFR BLD AUTO: 65.3 %
NITRITE URINE: NEGATIVE
NONHDLC SERPL-MCNC: 130 MG/DL
PH URINE: 7.5
PLATELET # BLD AUTO: 266 K/UL
POTASSIUM SERPL-SCNC: 4.5 MMOL/L
PROT SERPL-MCNC: 6.6 G/DL
PROTEIN URINE: 100 MG/DL
PSA FREE FLD-MCNC: 58 %
PSA FREE SERPL-MCNC: 0.07 NG/ML
PSA SERPL-MCNC: 0.13 NG/ML
RBC # BLD: 3.53 M/UL
RBC # FLD: 14.5 %
RED BLOOD CELLS URINE: 4 /HPF
REVIEW: NORMAL
SODIUM SERPL-SCNC: 137 MMOL/L
SPECIFIC GRAVITY URINE: 1.02
T4 FREE SERPL-MCNC: 1.9 NG/DL
TIBC SERPL-MCNC: 212 UG/DL
TRIGL SERPL-MCNC: 174 MG/DL
TSH SERPL-ACNC: 6.86 UIU/ML
UIBC SERPL-MCNC: 155 UG/DL
UROBILINOGEN URINE: 1 MG/DL
VIT B12 SERPL-MCNC: 840 PG/ML
WBC # FLD AUTO: 5.6 K/UL
WHITE BLOOD CELLS URINE: 0 /HPF
YEAST-LIKE CELLS: PRESENT

## 2024-10-17 ENCOUNTER — APPOINTMENT (OUTPATIENT)
Dept: ORTHOPEDIC SURGERY | Facility: CLINIC | Age: 84
End: 2024-10-17
Payer: MEDICAID

## 2024-10-17 VITALS — DIASTOLIC BLOOD PRESSURE: 59 MMHG | SYSTOLIC BLOOD PRESSURE: 102 MMHG | HEART RATE: 83 BPM

## 2024-10-17 DIAGNOSIS — Z96.642 PRESENCE OF LEFT ARTIFICIAL HIP JOINT: ICD-10-CM

## 2024-10-17 PROCEDURE — 99024 POSTOP FOLLOW-UP VISIT: CPT

## 2024-10-17 PROCEDURE — 73502 X-RAY EXAM HIP UNI 2-3 VIEWS: CPT | Mod: LT

## 2024-11-11 ENCOUNTER — OUTPATIENT (OUTPATIENT)
Dept: OUTPATIENT SERVICES | Facility: HOSPITAL | Age: 84
LOS: 1 days | Discharge: HOME | End: 2024-11-11

## 2024-11-11 DIAGNOSIS — Z98.890 OTHER SPECIFIED POSTPROCEDURAL STATES: Chronic | ICD-10-CM

## 2024-11-11 DIAGNOSIS — C61 MALIGNANT NEOPLASM OF PROSTATE: ICD-10-CM

## 2024-11-15 ENCOUNTER — RESULT REVIEW (OUTPATIENT)
Age: 84
End: 2024-11-15

## 2024-11-15 ENCOUNTER — APPOINTMENT (OUTPATIENT)
Dept: HEMATOLOGY ONCOLOGY | Facility: CLINIC | Age: 84
End: 2024-11-15
Payer: MEDICAID

## 2024-11-15 VITALS
RESPIRATION RATE: 18 BRPM | TEMPERATURE: 97.2 F | BODY MASS INDEX: 22.71 KG/M2 | HEART RATE: 79 BPM | SYSTOLIC BLOOD PRESSURE: 109 MMHG | OXYGEN SATURATION: 99 % | HEIGHT: 62 IN | WEIGHT: 123.44 LBS | DIASTOLIC BLOOD PRESSURE: 52 MMHG

## 2024-11-15 DIAGNOSIS — C79.51 SECONDARY MALIGNANT NEOPLASM OF BONE: ICD-10-CM

## 2024-11-15 DIAGNOSIS — C61 MALIGNANT NEOPLASM OF PROSTATE: ICD-10-CM

## 2024-11-15 DIAGNOSIS — C79.51 MALIGNANT NEOPLASM OF PROSTATE: ICD-10-CM

## 2024-11-15 LAB
BASOPHILS # BLD AUTO: 0.03 K/UL — SIGNIFICANT CHANGE UP (ref 0–0.2)
BASOPHILS NFR BLD AUTO: 0.7 % — SIGNIFICANT CHANGE UP (ref 0–2)
EOSINOPHIL # BLD AUTO: 0.19 K/UL — SIGNIFICANT CHANGE UP (ref 0–0.5)
EOSINOPHIL NFR BLD AUTO: 4.2 % — SIGNIFICANT CHANGE UP (ref 0–6)
HCT VFR BLD CALC: 28.6 % — LOW (ref 39–50)
HGB BLD-MCNC: 9.6 G/DL — LOW (ref 13–17)
IMM GRANULOCYTES NFR BLD AUTO: 0.2 % — SIGNIFICANT CHANGE UP (ref 0–0.9)
LYMPHOCYTES # BLD AUTO: 0.49 K/UL — LOW (ref 1–3.3)
LYMPHOCYTES # BLD AUTO: 10.9 % — LOW (ref 13–44)
MCHC RBC-ENTMCNC: 28.6 PG — SIGNIFICANT CHANGE UP (ref 27–34)
MCHC RBC-ENTMCNC: 33.6 G/DL — SIGNIFICANT CHANGE UP (ref 32–36)
MCV RBC AUTO: 85.1 FL — SIGNIFICANT CHANGE UP (ref 80–100)
MONOCYTES # BLD AUTO: 0.32 K/UL — SIGNIFICANT CHANGE UP (ref 0–0.9)
MONOCYTES NFR BLD AUTO: 7.1 % — SIGNIFICANT CHANGE UP (ref 2–14)
NEUTROPHILS # BLD AUTO: 3.47 K/UL — SIGNIFICANT CHANGE UP (ref 1.8–7.4)
NEUTROPHILS NFR BLD AUTO: 76.9 % — SIGNIFICANT CHANGE UP (ref 43–77)
NRBC # BLD: 0 /100 WBCS — SIGNIFICANT CHANGE UP (ref 0–0)
PLATELET # BLD AUTO: 140 K/UL — LOW (ref 150–400)
RBC # BLD: 3.36 M/UL — LOW (ref 4.2–5.8)
RBC # FLD: 14.4 % — SIGNIFICANT CHANGE UP (ref 10.3–14.5)
WBC # BLD: 4.51 K/UL — SIGNIFICANT CHANGE UP (ref 3.8–10.5)
WBC # FLD AUTO: 4.51 K/UL — SIGNIFICANT CHANGE UP (ref 3.8–10.5)

## 2024-11-15 PROCEDURE — 99213 OFFICE O/P EST LOW 20 MIN: CPT

## 2024-11-15 PROCEDURE — G2211 COMPLEX E/M VISIT ADD ON: CPT | Mod: NC

## 2024-11-17 LAB
ALBUMIN SERPL ELPH-MCNC: 4.1 G/DL
ALP BLD-CCNC: 110 U/L
ALT SERPL-CCNC: 5 U/L
ANION GAP SERPL CALC-SCNC: 13 MMOL/L
AST SERPL-CCNC: 16 U/L
BILIRUB SERPL-MCNC: 0.4 MG/DL
BUN SERPL-MCNC: 21 MG/DL
CALCIUM SERPL-MCNC: 8.7 MG/DL
CHLORIDE SERPL-SCNC: 103 MMOL/L
CO2 SERPL-SCNC: 21 MMOL/L
CREAT SERPL-MCNC: 0.87 MG/DL
EGFR: 85 ML/MIN/1.73M2
GLUCOSE SERPL-MCNC: 151 MG/DL
POTASSIUM SERPL-SCNC: 4.7 MMOL/L
PROT SERPL-MCNC: 5.9 G/DL
PSA SERPL-MCNC: 0.07 NG/ML
SODIUM SERPL-SCNC: 137 MMOL/L

## 2024-12-07 NOTE — PROGRESS NOTE ADULT - PROBLEM SELECTOR PROBLEM 1
Prostate cancer metastatic to bone
Prostate cancer metastatic to bone
8
Prostate cancer metastatic to bone

## 2024-12-20 ENCOUNTER — APPOINTMENT (OUTPATIENT)
Dept: HEMATOLOGY ONCOLOGY | Facility: CLINIC | Age: 84
End: 2024-12-20
Payer: MEDICAID

## 2024-12-20 ENCOUNTER — RESULT REVIEW (OUTPATIENT)
Age: 84
End: 2024-12-20

## 2024-12-20 ENCOUNTER — APPOINTMENT (OUTPATIENT)
Dept: INFUSION THERAPY | Facility: HOSPITAL | Age: 84
End: 2024-12-20

## 2024-12-20 VITALS
BODY MASS INDEX: 23.59 KG/M2 | RESPIRATION RATE: 15 BRPM | DIASTOLIC BLOOD PRESSURE: 62 MMHG | WEIGHT: 128.97 LBS | HEART RATE: 92 BPM | SYSTOLIC BLOOD PRESSURE: 99 MMHG | TEMPERATURE: 97.9 F | OXYGEN SATURATION: 99 %

## 2024-12-20 DIAGNOSIS — C79.51 MALIGNANT NEOPLASM OF PROSTATE: ICD-10-CM

## 2024-12-20 DIAGNOSIS — C61 MALIGNANT NEOPLASM OF PROSTATE: ICD-10-CM

## 2024-12-20 LAB
BASOPHILS # BLD AUTO: 0.02 K/UL — SIGNIFICANT CHANGE UP (ref 0–0.2)
BASOPHILS NFR BLD AUTO: 0.7 % — SIGNIFICANT CHANGE UP (ref 0–2)
EOSINOPHIL # BLD AUTO: 0.26 K/UL — SIGNIFICANT CHANGE UP (ref 0–0.5)
EOSINOPHIL NFR BLD AUTO: 8.8 % — HIGH (ref 0–6)
HCT VFR BLD CALC: 28.8 % — LOW (ref 39–50)
HGB BLD-MCNC: 9.7 G/DL — LOW (ref 13–17)
IMM GRANULOCYTES NFR BLD AUTO: 2 % — HIGH (ref 0–0.9)
LYMPHOCYTES # BLD AUTO: 0.53 K/UL — LOW (ref 1–3.3)
LYMPHOCYTES # BLD AUTO: 18 % — SIGNIFICANT CHANGE UP (ref 13–44)
MCHC RBC-ENTMCNC: 28.3 PG — SIGNIFICANT CHANGE UP (ref 27–34)
MCHC RBC-ENTMCNC: 33.7 G/DL — SIGNIFICANT CHANGE UP (ref 32–36)
MCV RBC AUTO: 84 FL — SIGNIFICANT CHANGE UP (ref 80–100)
MONOCYTES # BLD AUTO: 0.3 K/UL — SIGNIFICANT CHANGE UP (ref 0–0.9)
MONOCYTES NFR BLD AUTO: 10.2 % — SIGNIFICANT CHANGE UP (ref 2–14)
NEUTROPHILS # BLD AUTO: 1.78 K/UL — LOW (ref 1.8–7.4)
NEUTROPHILS NFR BLD AUTO: 60.3 % — SIGNIFICANT CHANGE UP (ref 43–77)
NRBC # BLD: 0 /100 WBCS — SIGNIFICANT CHANGE UP (ref 0–0)
PLATELET # BLD AUTO: 147 K/UL — LOW (ref 150–400)
RBC # BLD: 3.43 M/UL — LOW (ref 4.2–5.8)
RBC # FLD: 14.1 % — SIGNIFICANT CHANGE UP (ref 10.3–14.5)
WBC # BLD: 2.95 K/UL — LOW (ref 3.8–10.5)
WBC # FLD AUTO: 2.95 K/UL — LOW (ref 3.8–10.5)

## 2024-12-20 PROCEDURE — G2211 COMPLEX E/M VISIT ADD ON: CPT | Mod: NC

## 2024-12-20 PROCEDURE — 99213 OFFICE O/P EST LOW 20 MIN: CPT

## 2024-12-22 LAB
ALBUMIN SERPL ELPH-MCNC: 4 G/DL
ALP BLD-CCNC: 151 U/L
ALT SERPL-CCNC: 16 U/L
ANION GAP SERPL CALC-SCNC: 11 MMOL/L
AST SERPL-CCNC: 36 U/L
BILIRUB SERPL-MCNC: 0.4 MG/DL
BUN SERPL-MCNC: 16 MG/DL
CALCIUM SERPL-MCNC: 9.1 MG/DL
CHLORIDE SERPL-SCNC: 103 MMOL/L
CO2 SERPL-SCNC: 25 MMOL/L
CREAT SERPL-MCNC: 0.88 MG/DL
EGFR: 85 ML/MIN/1.73M2
GLUCOSE SERPL-MCNC: 138 MG/DL
POTASSIUM SERPL-SCNC: 4.3 MMOL/L
PROT SERPL-MCNC: 5.8 G/DL
PSA SERPL-MCNC: 0.05 NG/ML
SODIUM SERPL-SCNC: 140 MMOL/L
TESTOST SERPL-MCNC: <2.5 NG/DL

## 2024-12-23 DIAGNOSIS — Z51.11 ENCOUNTER FOR ANTINEOPLASTIC CHEMOTHERAPY: ICD-10-CM

## 2025-01-28 ENCOUNTER — OUTPATIENT (OUTPATIENT)
Dept: OUTPATIENT SERVICES | Facility: HOSPITAL | Age: 85
LOS: 1 days | Discharge: HOME | End: 2025-01-28

## 2025-01-28 DIAGNOSIS — Z98.890 OTHER SPECIFIED POSTPROCEDURAL STATES: Chronic | ICD-10-CM

## 2025-01-28 DIAGNOSIS — C61 MALIGNANT NEOPLASM OF PROSTATE: ICD-10-CM

## 2025-01-31 ENCOUNTER — RESULT REVIEW (OUTPATIENT)
Age: 85
End: 2025-01-31

## 2025-01-31 ENCOUNTER — APPOINTMENT (OUTPATIENT)
Dept: HEMATOLOGY ONCOLOGY | Facility: CLINIC | Age: 85
End: 2025-01-31
Payer: MEDICAID

## 2025-01-31 VITALS
DIASTOLIC BLOOD PRESSURE: 57 MMHG | SYSTOLIC BLOOD PRESSURE: 113 MMHG | HEIGHT: 62 IN | TEMPERATURE: 97.4 F | WEIGHT: 126.75 LBS | HEART RATE: 82 BPM | RESPIRATION RATE: 16 BRPM | OXYGEN SATURATION: 98 % | BODY MASS INDEX: 23.32 KG/M2

## 2025-01-31 LAB
ALBUMIN SERPL ELPH-MCNC: 4.1 G/DL
ALP BLD-CCNC: 95 U/L
ALT SERPL-CCNC: 5 U/L
ANION GAP SERPL CALC-SCNC: 13 MMOL/L
AST SERPL-CCNC: 15 U/L
BASOPHILS # BLD AUTO: 0.02 K/UL — SIGNIFICANT CHANGE UP (ref 0–0.2)
BASOPHILS NFR BLD AUTO: 0.6 % — SIGNIFICANT CHANGE UP (ref 0–2)
BILIRUB SERPL-MCNC: 0.4 MG/DL
BUN SERPL-MCNC: 24 MG/DL
CALCIUM SERPL-MCNC: 9.3 MG/DL
CHLORIDE SERPL-SCNC: 105 MMOL/L
CO2 SERPL-SCNC: 23 MMOL/L
CREAT SERPL-MCNC: 1.06 MG/DL
EGFR: 69 ML/MIN/1.73M2
EOSINOPHIL # BLD AUTO: 0.3 K/UL — SIGNIFICANT CHANGE UP (ref 0–0.5)
EOSINOPHIL NFR BLD AUTO: 8.6 % — HIGH (ref 0–6)
GLUCOSE SERPL-MCNC: 129 MG/DL
HCT VFR BLD CALC: 30.4 % — LOW (ref 39–50)
HGB BLD-MCNC: 10.5 G/DL — LOW (ref 13–17)
IMM GRANULOCYTES NFR BLD AUTO: 0.3 % — SIGNIFICANT CHANGE UP (ref 0–0.9)
LYMPHOCYTES # BLD AUTO: 0.96 K/UL — LOW (ref 1–3.3)
LYMPHOCYTES # BLD AUTO: 27.7 % — SIGNIFICANT CHANGE UP (ref 13–44)
MCHC RBC-ENTMCNC: 29.3 PG — SIGNIFICANT CHANGE UP (ref 27–34)
MCHC RBC-ENTMCNC: 34.5 G/DL — SIGNIFICANT CHANGE UP (ref 32–36)
MCV RBC AUTO: 84.9 FL — SIGNIFICANT CHANGE UP (ref 80–100)
MONOCYTES # BLD AUTO: 0.36 K/UL — SIGNIFICANT CHANGE UP (ref 0–0.9)
MONOCYTES NFR BLD AUTO: 10.4 % — SIGNIFICANT CHANGE UP (ref 2–14)
NEUTROPHILS # BLD AUTO: 1.82 K/UL — SIGNIFICANT CHANGE UP (ref 1.8–7.4)
NEUTROPHILS NFR BLD AUTO: 52.4 % — SIGNIFICANT CHANGE UP (ref 43–77)
NRBC # BLD: 0 /100 WBCS — SIGNIFICANT CHANGE UP (ref 0–0)
NRBC BLD-RTO: 0 /100 WBCS — SIGNIFICANT CHANGE UP (ref 0–0)
PLATELET # BLD AUTO: 124 K/UL — LOW (ref 150–400)
POTASSIUM SERPL-SCNC: 4.1 MMOL/L
PROT SERPL-MCNC: 6.1 G/DL
PSA SERPL-MCNC: 0.03 NG/ML
RBC # BLD: 3.58 M/UL — LOW (ref 4.2–5.8)
RBC # FLD: 14.6 % — HIGH (ref 10.3–14.5)
SODIUM SERPL-SCNC: 140 MMOL/L
WBC # BLD: 3.47 K/UL — LOW (ref 3.8–10.5)
WBC # FLD AUTO: 3.47 K/UL — LOW (ref 3.8–10.5)

## 2025-01-31 PROCEDURE — 99213 OFFICE O/P EST LOW 20 MIN: CPT

## 2025-01-31 PROCEDURE — G2211 COMPLEX E/M VISIT ADD ON: CPT | Mod: NC

## 2025-02-05 ENCOUNTER — APPOINTMENT (OUTPATIENT)
Dept: INTERNAL MEDICINE | Facility: CLINIC | Age: 85
End: 2025-02-05
Payer: MEDICAID

## 2025-02-05 VITALS
DIASTOLIC BLOOD PRESSURE: 62 MMHG | WEIGHT: 126 LBS | TEMPERATURE: 97.9 F | SYSTOLIC BLOOD PRESSURE: 101 MMHG | BODY MASS INDEX: 23.19 KG/M2 | HEART RATE: 87 BPM | RESPIRATION RATE: 16 BRPM | HEIGHT: 62 IN | OXYGEN SATURATION: 97 %

## 2025-02-05 DIAGNOSIS — L57.0 ACTINIC KERATOSIS: ICD-10-CM

## 2025-02-05 DIAGNOSIS — D64.9 ANEMIA, UNSPECIFIED: ICD-10-CM

## 2025-02-05 DIAGNOSIS — C61 MALIGNANT NEOPLASM OF PROSTATE: ICD-10-CM

## 2025-02-05 DIAGNOSIS — C79.51 MALIGNANT NEOPLASM OF PROSTATE: ICD-10-CM

## 2025-02-05 DIAGNOSIS — E03.9 HYPOTHYROIDISM, UNSPECIFIED: ICD-10-CM

## 2025-02-05 DIAGNOSIS — E11.9 TYPE 2 DIABETES MELLITUS W/OUT COMPLICATIONS: ICD-10-CM

## 2025-02-05 PROCEDURE — 99214 OFFICE O/P EST MOD 30 MIN: CPT

## 2025-02-05 PROCEDURE — G2211 COMPLEX E/M VISIT ADD ON: CPT | Mod: NC

## 2025-03-07 ENCOUNTER — APPOINTMENT (OUTPATIENT)
Dept: HEMATOLOGY ONCOLOGY | Facility: CLINIC | Age: 85
End: 2025-03-07
Payer: MEDICAID

## 2025-03-07 ENCOUNTER — APPOINTMENT (OUTPATIENT)
Dept: INFUSION THERAPY | Facility: HOSPITAL | Age: 85
End: 2025-03-07

## 2025-03-07 VITALS
TEMPERATURE: 98.2 F | OXYGEN SATURATION: 97 % | RESPIRATION RATE: 15 BRPM | WEIGHT: 128.95 LBS | BODY MASS INDEX: 23.73 KG/M2 | HEIGHT: 62 IN | HEART RATE: 88 BPM | DIASTOLIC BLOOD PRESSURE: 62 MMHG | SYSTOLIC BLOOD PRESSURE: 107 MMHG

## 2025-03-07 DIAGNOSIS — C79.51 MALIGNANT NEOPLASM OF PROSTATE: ICD-10-CM

## 2025-03-07 DIAGNOSIS — C61 MALIGNANT NEOPLASM OF PROSTATE: ICD-10-CM

## 2025-03-07 PROCEDURE — 99214 OFFICE O/P EST MOD 30 MIN: CPT

## 2025-04-11 ENCOUNTER — APPOINTMENT (OUTPATIENT)
Dept: VASCULAR SURGERY | Facility: CLINIC | Age: 85
End: 2025-04-11

## 2025-04-21 ENCOUNTER — APPOINTMENT (OUTPATIENT)
Dept: ORTHOPEDIC SURGERY | Facility: CLINIC | Age: 85
End: 2025-04-21
Payer: MEDICAID

## 2025-04-21 DIAGNOSIS — Z96.642 PRESENCE OF LEFT ARTIFICIAL HIP JOINT: ICD-10-CM

## 2025-04-21 PROCEDURE — 73502 X-RAY EXAM HIP UNI 2-3 VIEWS: CPT | Mod: LT

## 2025-04-21 PROCEDURE — 99213 OFFICE O/P EST LOW 20 MIN: CPT

## 2025-05-08 ENCOUNTER — OUTPATIENT (OUTPATIENT)
Dept: OUTPATIENT SERVICES | Facility: HOSPITAL | Age: 85
LOS: 1 days | Discharge: HOME | End: 2025-05-08

## 2025-05-08 DIAGNOSIS — C61 MALIGNANT NEOPLASM OF PROSTATE: ICD-10-CM

## 2025-05-08 DIAGNOSIS — Z98.890 OTHER SPECIFIED POSTPROCEDURAL STATES: Chronic | ICD-10-CM

## 2025-05-09 ENCOUNTER — RESULT REVIEW (OUTPATIENT)
Age: 85
End: 2025-05-09

## 2025-05-09 ENCOUNTER — APPOINTMENT (OUTPATIENT)
Dept: HEMATOLOGY ONCOLOGY | Facility: CLINIC | Age: 85
End: 2025-05-09
Payer: MEDICAID

## 2025-05-09 DIAGNOSIS — C61 MALIGNANT NEOPLASM OF PROSTATE: ICD-10-CM

## 2025-05-09 DIAGNOSIS — C79.51 MALIGNANT NEOPLASM OF PROSTATE: ICD-10-CM

## 2025-05-09 LAB
BASOPHILS # BLD AUTO: 0.03 K/UL — SIGNIFICANT CHANGE UP (ref 0–0.2)
BASOPHILS NFR BLD AUTO: 0.9 % — SIGNIFICANT CHANGE UP (ref 0–2)
EOSINOPHIL # BLD AUTO: 0.46 K/UL — SIGNIFICANT CHANGE UP (ref 0–0.5)
EOSINOPHIL NFR BLD AUTO: 13.8 % — HIGH (ref 0–6)
HCT VFR BLD CALC: 27.4 % — LOW (ref 39–50)
HGB BLD-MCNC: 9.6 G/DL — LOW (ref 13–17)
IMM GRANULOCYTES NFR BLD AUTO: 0 % — SIGNIFICANT CHANGE UP (ref 0–0.9)
LYMPHOCYTES # BLD AUTO: 0.77 K/UL — LOW (ref 1–3.3)
LYMPHOCYTES # BLD AUTO: 23.1 % — SIGNIFICANT CHANGE UP (ref 13–44)
MCHC RBC-ENTMCNC: 29.5 PG — SIGNIFICANT CHANGE UP (ref 27–34)
MCHC RBC-ENTMCNC: 35 G/DL — SIGNIFICANT CHANGE UP (ref 32–36)
MCV RBC AUTO: 84.3 FL — SIGNIFICANT CHANGE UP (ref 80–100)
MONOCYTES # BLD AUTO: 0.32 K/UL — SIGNIFICANT CHANGE UP (ref 0–0.9)
MONOCYTES NFR BLD AUTO: 9.6 % — SIGNIFICANT CHANGE UP (ref 2–14)
NEUTROPHILS # BLD AUTO: 1.76 K/UL — LOW (ref 1.8–7.4)
NEUTROPHILS NFR BLD AUTO: 52.6 % — SIGNIFICANT CHANGE UP (ref 43–77)
NRBC BLD AUTO-RTO: 0 /100 WBCS — SIGNIFICANT CHANGE UP (ref 0–0)
PLATELET # BLD AUTO: 136 K/UL — LOW (ref 150–400)
RBC # BLD: 3.25 M/UL — LOW (ref 4.2–5.8)
RBC # FLD: 13.9 % — SIGNIFICANT CHANGE UP (ref 10.3–14.5)
WBC # BLD: 3.34 K/UL — LOW (ref 3.8–10.5)
WBC # FLD AUTO: 3.34 K/UL — LOW (ref 3.8–10.5)

## 2025-05-09 PROCEDURE — 99214 OFFICE O/P EST MOD 30 MIN: CPT

## 2025-05-18 LAB
ALBUMIN SERPL ELPH-MCNC: 3.8 G/DL
ALP BLD-CCNC: 85 U/L
ALT SERPL-CCNC: 9 U/L
ANION GAP SERPL CALC-SCNC: 13 MMOL/L
AST SERPL-CCNC: 13 U/L
BILIRUB SERPL-MCNC: 0.6 MG/DL
BUN SERPL-MCNC: 19 MG/DL
CALCIUM SERPL-MCNC: 8.7 MG/DL
CHLORIDE SERPL-SCNC: 103 MMOL/L
CO2 SERPL-SCNC: 22 MMOL/L
CREAT SERPL-MCNC: 1.07 MG/DL
EGFRCR SERPLBLD CKD-EPI 2021: 68 ML/MIN/1.73M2
GLUCOSE SERPL-MCNC: 158 MG/DL
POTASSIUM SERPL-SCNC: 4 MMOL/L
PROT SERPL-MCNC: 5.5 G/DL
PSA SERPL-MCNC: 0.02 NG/ML
SODIUM SERPL-SCNC: 139 MMOL/L

## 2025-05-22 ENCOUNTER — APPOINTMENT (OUTPATIENT)
Dept: INTERNAL MEDICINE | Facility: CLINIC | Age: 85
End: 2025-05-22
Payer: MEDICAID

## 2025-05-22 VITALS
RESPIRATION RATE: 15 BRPM | OXYGEN SATURATION: 99 % | HEART RATE: 74 BPM | HEIGHT: 62 IN | DIASTOLIC BLOOD PRESSURE: 70 MMHG | WEIGHT: 124 LBS | TEMPERATURE: 98.3 F | BODY MASS INDEX: 22.82 KG/M2 | SYSTOLIC BLOOD PRESSURE: 126 MMHG

## 2025-05-22 DIAGNOSIS — E11.9 TYPE 2 DIABETES MELLITUS W/OUT COMPLICATIONS: ICD-10-CM

## 2025-05-22 DIAGNOSIS — C79.51 MALIGNANT NEOPLASM OF PROSTATE: ICD-10-CM

## 2025-05-22 DIAGNOSIS — E03.9 HYPOTHYROIDISM, UNSPECIFIED: ICD-10-CM

## 2025-05-22 DIAGNOSIS — C61 MALIGNANT NEOPLASM OF PROSTATE: ICD-10-CM

## 2025-05-22 PROCEDURE — G2211 COMPLEX E/M VISIT ADD ON: CPT | Mod: NC

## 2025-05-22 PROCEDURE — 99214 OFFICE O/P EST MOD 30 MIN: CPT

## 2025-05-23 LAB
CHOLEST SERPL-MCNC: 164 MG/DL
ESTIMATED AVERAGE GLUCOSE: 140 MG/DL
GLUCOSE BS SERPL-MCNC: 117 MG/DL
HBA1C MFR BLD HPLC: 6.5 %
HDLC SERPL-MCNC: 38 MG/DL
LDLC SERPL-MCNC: 97 MG/DL
NONHDLC SERPL-MCNC: 127 MG/DL
T4 FREE SERPL-MCNC: 1.2 NG/DL
TRIGL SERPL-MCNC: 169 MG/DL
TSH SERPL-ACNC: 6.53 UIU/ML

## 2025-05-30 ENCOUNTER — APPOINTMENT (OUTPATIENT)
Dept: INFUSION THERAPY | Facility: HOSPITAL | Age: 85
End: 2025-05-30

## 2025-07-01 NOTE — PROGRESS NOTE ADULT - PROBLEM/PLAN-7
Pharmacy faxed request     LOV 5/2025  
DISPLAY PLAN FREE TEXT

## 2025-09-05 ENCOUNTER — APPOINTMENT (OUTPATIENT)
Dept: HEMATOLOGY ONCOLOGY | Facility: CLINIC | Age: 85
End: 2025-09-05

## (undated) DEVICE — SUT POLYSORB 1 36" GS-21 UNDYED

## (undated) DEVICE — POSITIONER FOAM HEADREST (PINK)

## (undated) DEVICE — VENODYNE/SCD SLEEVE CALF MEDIUM

## (undated) DEVICE — SUT POLYSORB 3-0 30" P-12 UNDYED

## (undated) DEVICE — DRAPE C ARM UNIVERSAL

## (undated) DEVICE — ELCTR BOVIE PENCIL SMOKE EVACUATION

## (undated) DEVICE — ELCTR BOVIE TIP BLADE INSULATED 6.5" EDGE

## (undated) DEVICE — PACK TOTAL HIP (2 PACKS)

## (undated) DEVICE — DRILL BIT BIOMET RINGLOCK QUICK CONNECT 3.2MM X 30MM

## (undated) DEVICE — Device

## (undated) DEVICE — SUT POLYSORB 2-0 36" GS-21 UNDYED

## (undated) DEVICE — SOL IRR POUR H2O 1000ML

## (undated) DEVICE — DRAPE SHOWER CURTAIN ISOLATION

## (undated) DEVICE — SAW BLADE STRYKER DUAL CUT 1.27X11 X90MM

## (undated) DEVICE — SUT MONOCRYL 3-0 18" PS-2 UNDYED

## (undated) DEVICE — SYNTHES REAMING ROD WITH BALL TIP 2.5MM 950MM

## (undated) DEVICE — STRYKER MIXEVAC 3 BONE CEMENT MIXER

## (undated) DEVICE — SPECIMEN CONTAINER 100ML

## (undated) DEVICE — STRYKER BONE MILL & FINE BLADE

## (undated) DEVICE — STRYKER INTERPULSE HANDPIECE W IRR SUCTION TUBE

## (undated) DEVICE — HOOD T7 NON-PEELAWAY

## (undated) DEVICE — WARMING BLANKET UPPER ADULT

## (undated) DEVICE — SOL IRR POUR NS 0.9% 500ML

## (undated) DEVICE — SAW BLADE STRYKER SAGITTAL 3 HOLE OSCILLATING

## (undated) DEVICE — GLV 9 DURAPRENE

## (undated) DEVICE — ELCTR AQUAMANTYS BIPOLAR SEALER 6.0

## (undated) DEVICE — POSITIONER FOAM EGG CRATE ULNAR 2PCS (PINK)

## (undated) DEVICE — GLV 9 PROTEXIS (WHITE)

## (undated) DEVICE — SOL IRR BAG NS 0.9% 3000ML

## (undated) DEVICE — SAW BLADE STRYKER RECIPROCATING 77.6X0.77X11.2MM

## (undated) DEVICE — DRAPE 3/4 SHEET W REINFORCEMENT 56X77"

## (undated) DEVICE — DRAPE SPLIT SHEET 77" X 108"